# Patient Record
Sex: FEMALE | Race: WHITE | Employment: OTHER | URBAN - METROPOLITAN AREA
[De-identification: names, ages, dates, MRNs, and addresses within clinical notes are randomized per-mention and may not be internally consistent; named-entity substitution may affect disease eponyms.]

---

## 2017-02-08 ENCOUNTER — HOSPITAL ENCOUNTER (OUTPATIENT)
Dept: GENERAL RADIOLOGY | Age: 82
Discharge: HOME | End: 2017-02-08
Payer: MEDICARE

## 2017-02-08 DIAGNOSIS — R10.10 PAIN OF UPPER ABDOMEN: ICD-10-CM

## 2017-02-08 PROCEDURE — 74246 X-RAY XM UPR GI TRC 2CNTRST: CPT | Performed by: RADIOLOGY

## 2017-02-08 PROCEDURE — 74247 FL UGI WITH AIR CONTRAST: CPT

## 2017-02-08 PROCEDURE — G9500 RAD EXPOS IND/EXP TM DOC: HCPCS | Performed by: RADIOLOGY

## 2017-02-08 PROCEDURE — 74220 X-RAY XM ESOPHAGUS 1CNTRST: CPT

## 2017-02-13 PROBLEM — R06.00 DYSPNEA: Status: ACTIVE | Noted: 2017-02-13

## 2017-07-21 PROBLEM — Z85.828 HISTORY OF SKIN CANCER: Status: ACTIVE | Noted: 2017-07-21

## 2017-08-22 ENCOUNTER — HOSPITAL ENCOUNTER (OUTPATIENT)
Age: 82
Setting detail: OUTPATIENT SURGERY
Discharge: HOME | End: 2017-08-22
Attending: OPHTHALMOLOGY | Admitting: OPHTHALMOLOGY
Payer: MEDICARE

## 2017-08-22 VITALS
RESPIRATION RATE: 14 BRPM | TEMPERATURE: 97.5 F | HEART RATE: 68 BPM | OXYGEN SATURATION: 99 % | WEIGHT: 164 LBS | HEIGHT: 64 IN | SYSTOLIC BLOOD PRESSURE: 177 MMHG | BODY MASS INDEX: 28 KG/M2 | DIASTOLIC BLOOD PRESSURE: 72 MMHG

## 2017-08-22 PROBLEM — H25.12 CATARACT, NUCLEAR SCLEROTIC, LEFT EYE: Status: RESOLVED | Noted: 2017-08-22 | Resolved: 2017-08-22

## 2017-08-22 LAB — GLUCOSE BLD-MCNC: 169 MG/DL (ref 65–105)

## 2017-08-22 PROCEDURE — 2500000003 HC RX 250 WO HCPCS: Performed by: OPHTHALMOLOGY

## 2017-08-22 PROCEDURE — 82947 ASSAY GLUCOSE BLOOD QUANT: CPT

## 2017-08-22 PROCEDURE — 3600000013 HC SURGERY LEVEL 3 ADDTL 15MIN: Performed by: OPHTHALMOLOGY

## 2017-08-22 PROCEDURE — 7100000011 HC PHASE II RECOVERY - ADDTL 15 MIN: Performed by: OPHTHALMOLOGY

## 2017-08-22 PROCEDURE — 6370000000 HC RX 637 (ALT 250 FOR IP): Performed by: OPHTHALMOLOGY

## 2017-08-22 PROCEDURE — 2580000003 HC RX 258: Performed by: OPHTHALMOLOGY

## 2017-08-22 PROCEDURE — V2632 POST CHMBR INTRAOCULAR LENS: HCPCS | Performed by: OPHTHALMOLOGY

## 2017-08-22 PROCEDURE — 7100000010 HC PHASE II RECOVERY - FIRST 15 MIN: Performed by: OPHTHALMOLOGY

## 2017-08-22 PROCEDURE — 3600000003 HC SURGERY LEVEL 3 BASE: Performed by: OPHTHALMOLOGY

## 2017-08-22 PROCEDURE — 99152 MOD SED SAME PHYS/QHP 5/>YRS: CPT | Performed by: OPHTHALMOLOGY

## 2017-08-22 PROCEDURE — 6360000002 HC RX W HCPCS: Performed by: OPHTHALMOLOGY

## 2017-08-22 DEVICE — ACRYSOF(R) IQ ASPHERIC NATURAL IOL, SINGLE-PIECE ACRYLIC FOLDABLE PCL, UV WITH BLUE LIGHTFILTER, 13.0MM LENGTH, 6.0MM ANTERIORASYMMETRIC BICONVEX OPTIC, PLANAR HAPTICS.
Type: IMPLANTABLE DEVICE | Site: EYE | Status: FUNCTIONAL
Brand: ACRYSOF®

## 2017-08-22 RX ORDER — PHENYLEPHRINE HCL 2.5 %
1 DROPS OPHTHALMIC (EYE) EVERY 5 MIN PRN
Status: COMPLETED | OUTPATIENT
Start: 2017-08-22 | End: 2017-08-22

## 2017-08-22 RX ORDER — SODIUM CHLORIDE, SODIUM LACTATE, POTASSIUM CHLORIDE, CALCIUM CHLORIDE 600; 310; 30; 20 MG/100ML; MG/100ML; MG/100ML; MG/100ML
INJECTION, SOLUTION INTRAVENOUS CONTINUOUS
Status: DISCONTINUED | OUTPATIENT
Start: 2017-08-22 | End: 2017-08-22 | Stop reason: HOSPADM

## 2017-08-22 RX ORDER — LIDOCAINE HYDROCHLORIDE 10 MG/ML
INJECTION, SOLUTION INFILTRATION; PERINEURAL PRN
Status: DISCONTINUED | OUTPATIENT
Start: 2017-08-22 | End: 2017-08-22 | Stop reason: HOSPADM

## 2017-08-22 RX ORDER — MIDAZOLAM HYDROCHLORIDE 1 MG/ML
INJECTION INTRAMUSCULAR; INTRAVENOUS PRN
Status: DISCONTINUED | OUTPATIENT
Start: 2017-08-22 | End: 2017-08-22 | Stop reason: HOSPADM

## 2017-08-22 RX ORDER — BUPIVACAINE HYDROCHLORIDE 5 MG/ML
INJECTION, SOLUTION EPIDURAL; INTRACAUDAL PRN
Status: DISCONTINUED | OUTPATIENT
Start: 2017-08-22 | End: 2017-08-22 | Stop reason: HOSPADM

## 2017-08-22 RX ORDER — KETOROLAC TROMETHAMINE 5 MG/ML
1 SOLUTION OPHTHALMIC EVERY 5 MIN PRN
Status: COMPLETED | OUTPATIENT
Start: 2017-08-22 | End: 2017-08-22

## 2017-08-22 RX ORDER — CYCLOPENTOLATE HYDROCHLORIDE 10 MG/ML
1 SOLUTION/ DROPS OPHTHALMIC EVERY 5 MIN PRN
Status: COMPLETED | OUTPATIENT
Start: 2017-08-22 | End: 2017-08-22

## 2017-08-22 RX ORDER — TOBRAMYCIN 3 MG/ML
1 SOLUTION/ DROPS OPHTHALMIC EVERY 5 MIN PRN
Status: DISCONTINUED | OUTPATIENT
Start: 2017-08-22 | End: 2017-08-22 | Stop reason: HOSPADM

## 2017-08-22 RX ORDER — NEOMYCIN SULFATE, POLYMYXIN B SULFATE, AND DEXAMETHASONE 3.5; 10000; 1 MG/G; [USP'U]/G; MG/G
OINTMENT OPHTHALMIC PRN
Status: DISCONTINUED | OUTPATIENT
Start: 2017-08-22 | End: 2017-08-22 | Stop reason: HOSPADM

## 2017-08-22 RX ORDER — BALANCED SALT SOLUTION ENRICHED WITH BICARBONATE, DEXTROSE, AND GLUTATHIONE
KIT INTRAOCULAR PRN
Status: DISCONTINUED | OUTPATIENT
Start: 2017-08-22 | End: 2017-08-22 | Stop reason: HOSPADM

## 2017-08-22 RX ORDER — LORAZEPAM 1 MG/1
1 TABLET ORAL ONCE
Status: COMPLETED | OUTPATIENT
Start: 2017-08-22 | End: 2017-08-22

## 2017-08-22 RX ORDER — TIMOLOL MALEATE 5 MG/ML
SOLUTION/ DROPS OPHTHALMIC PRN
Status: DISCONTINUED | OUTPATIENT
Start: 2017-08-22 | End: 2017-08-22 | Stop reason: HOSPADM

## 2017-08-22 RX ORDER — BUPIVACAINE HYDROCHLORIDE 5 MG/ML
1 INJECTION, SOLUTION EPIDURAL; INTRACAUDAL ONCE
Status: COMPLETED | OUTPATIENT
Start: 2017-08-22 | End: 2017-08-22

## 2017-08-22 RX ADMIN — CYCLOPENTOLATE HYDROCHLORIDE 1 DROP: 10 SOLUTION/ DROPS OPHTHALMIC at 09:15

## 2017-08-22 RX ADMIN — KETOROLAC TROMETHAMINE 1 DROP: 5 SOLUTION OPHTHALMIC at 09:15

## 2017-08-22 RX ADMIN — TOBRAMYCIN 1 DROP: 3 SOLUTION OPHTHALMIC at 09:15

## 2017-08-22 RX ADMIN — CYCLOPENTOLATE HYDROCHLORIDE 1 DROP: 10 SOLUTION/ DROPS OPHTHALMIC at 09:31

## 2017-08-22 RX ADMIN — TOBRAMYCIN 1 DROP: 3 SOLUTION OPHTHALMIC at 09:31

## 2017-08-22 RX ADMIN — PHENYLEPHRINE HYDROCHLORIDE 1 DROP: 25 SOLUTION/ DROPS OPHTHALMIC at 09:15

## 2017-08-22 RX ADMIN — LORAZEPAM 1 MG: 1 TABLET ORAL at 09:09

## 2017-08-22 RX ADMIN — PHENYLEPHRINE HYDROCHLORIDE 1 DROP: 25 SOLUTION/ DROPS OPHTHALMIC at 09:31

## 2017-08-22 RX ADMIN — BUPIVACAINE HYDROCHLORIDE 5 MG: 5 INJECTION, SOLUTION EPIDURAL; INTRACAUDAL; PERINEURAL at 09:16

## 2017-08-22 RX ADMIN — KETOROLAC TROMETHAMINE 1 DROP: 5 SOLUTION OPHTHALMIC at 09:31

## 2017-08-22 RX ADMIN — SODIUM CHLORIDE, POTASSIUM CHLORIDE, SODIUM LACTATE AND CALCIUM CHLORIDE: 600; 310; 30; 20 INJECTION, SOLUTION INTRAVENOUS at 09:26

## 2017-08-22 ASSESSMENT — PAIN - FUNCTIONAL ASSESSMENT: PAIN_FUNCTIONAL_ASSESSMENT: 0-10

## 2017-08-22 ASSESSMENT — PAIN SCALES - GENERAL
PAINLEVEL_OUTOF10: 0
PAINLEVEL_OUTOF10: 0

## 2017-09-05 ENCOUNTER — HOSPITAL ENCOUNTER (OUTPATIENT)
Age: 82
Setting detail: OUTPATIENT SURGERY
Discharge: HOME | End: 2017-09-05
Attending: OPHTHALMOLOGY | Admitting: OPHTHALMOLOGY
Payer: MEDICARE

## 2017-09-05 VITALS
TEMPERATURE: 98 F | RESPIRATION RATE: 14 BRPM | WEIGHT: 163 LBS | BODY MASS INDEX: 27.83 KG/M2 | SYSTOLIC BLOOD PRESSURE: 147 MMHG | HEART RATE: 56 BPM | OXYGEN SATURATION: 97 % | HEIGHT: 64 IN | DIASTOLIC BLOOD PRESSURE: 79 MMHG

## 2017-09-05 PROBLEM — H25.11 CATARACT, NUCLEAR SCLEROTIC, RIGHT EYE: Status: RESOLVED | Noted: 2017-09-05 | Resolved: 2017-09-05

## 2017-09-05 LAB — GLUCOSE BLD-MCNC: 224 MG/DL (ref 65–105)

## 2017-09-05 PROCEDURE — 2580000003 HC RX 258: Performed by: OPHTHALMOLOGY

## 2017-09-05 PROCEDURE — 2500000003 HC RX 250 WO HCPCS: Performed by: OPHTHALMOLOGY

## 2017-09-05 PROCEDURE — 3600000003 HC SURGERY LEVEL 3 BASE: Performed by: OPHTHALMOLOGY

## 2017-09-05 PROCEDURE — 99153 MOD SED SAME PHYS/QHP EA: CPT | Performed by: OPHTHALMOLOGY

## 2017-09-05 PROCEDURE — 7100000010 HC PHASE II RECOVERY - FIRST 15 MIN: Performed by: OPHTHALMOLOGY

## 2017-09-05 PROCEDURE — 7100000011 HC PHASE II RECOVERY - ADDTL 15 MIN: Performed by: OPHTHALMOLOGY

## 2017-09-05 PROCEDURE — 6370000000 HC RX 637 (ALT 250 FOR IP): Performed by: OPHTHALMOLOGY

## 2017-09-05 PROCEDURE — 99152 MOD SED SAME PHYS/QHP 5/>YRS: CPT | Performed by: OPHTHALMOLOGY

## 2017-09-05 PROCEDURE — 82947 ASSAY GLUCOSE BLOOD QUANT: CPT

## 2017-09-05 PROCEDURE — 3600000013 HC SURGERY LEVEL 3 ADDTL 15MIN: Performed by: OPHTHALMOLOGY

## 2017-09-05 PROCEDURE — V2632 POST CHMBR INTRAOCULAR LENS: HCPCS | Performed by: OPHTHALMOLOGY

## 2017-09-05 PROCEDURE — 6360000002 HC RX W HCPCS: Performed by: OPHTHALMOLOGY

## 2017-09-05 DEVICE — ACRYSOF(R) IQ ASPHERIC NATURAL IOL, SINGLE-PIECE ACRYLIC FOLDABLE PCL, UV WITH BLUE LIGHTFILTER, 13.0MM LENGTH, 6.0MM ANTERIORASYMMETRIC BICONVEX OPTIC, PLANAR HAPTICS.
Type: IMPLANTABLE DEVICE | Site: EYE | Status: FUNCTIONAL
Brand: ACRYSOF®

## 2017-09-05 RX ORDER — CYCLOPENTOLATE HYDROCHLORIDE 10 MG/ML
1 SOLUTION/ DROPS OPHTHALMIC EVERY 5 MIN PRN
Status: COMPLETED | OUTPATIENT
Start: 2017-09-05 | End: 2017-09-05

## 2017-09-05 RX ORDER — MIDAZOLAM HYDROCHLORIDE 1 MG/ML
INJECTION INTRAMUSCULAR; INTRAVENOUS PRN
Status: DISCONTINUED | OUTPATIENT
Start: 2017-09-05 | End: 2017-09-05 | Stop reason: HOSPADM

## 2017-09-05 RX ORDER — PHENYLEPHRINE HCL 2.5 %
1 DROPS OPHTHALMIC (EYE) EVERY 5 MIN PRN
Status: COMPLETED | OUTPATIENT
Start: 2017-09-05 | End: 2017-09-05

## 2017-09-05 RX ORDER — BALANCED SALT SOLUTION ENRICHED WITH BICARBONATE, DEXTROSE, AND GLUTATHIONE
KIT INTRAOCULAR PRN
Status: DISCONTINUED | OUTPATIENT
Start: 2017-09-05 | End: 2017-09-05 | Stop reason: HOSPADM

## 2017-09-05 RX ORDER — SODIUM CHLORIDE, SODIUM LACTATE, POTASSIUM CHLORIDE, CALCIUM CHLORIDE 600; 310; 30; 20 MG/100ML; MG/100ML; MG/100ML; MG/100ML
INJECTION, SOLUTION INTRAVENOUS CONTINUOUS
Status: DISCONTINUED | OUTPATIENT
Start: 2017-09-05 | End: 2017-09-05 | Stop reason: HOSPADM

## 2017-09-05 RX ORDER — NEOMYCIN SULFATE, POLYMYXIN B SULFATE, AND DEXAMETHASONE 3.5; 10000; 1 MG/G; [USP'U]/G; MG/G
OINTMENT OPHTHALMIC PRN
Status: DISCONTINUED | OUTPATIENT
Start: 2017-09-05 | End: 2017-09-05 | Stop reason: HOSPADM

## 2017-09-05 RX ORDER — LORAZEPAM 1 MG/1
1 TABLET ORAL ONCE
Status: COMPLETED | OUTPATIENT
Start: 2017-09-05 | End: 2017-09-05

## 2017-09-05 RX ORDER — TOBRAMYCIN 3 MG/ML
1 SOLUTION/ DROPS OPHTHALMIC EVERY 5 MIN PRN
Status: DISCONTINUED | OUTPATIENT
Start: 2017-09-05 | End: 2017-09-05 | Stop reason: HOSPADM

## 2017-09-05 RX ORDER — KETOROLAC TROMETHAMINE 5 MG/ML
1 SOLUTION OPHTHALMIC EVERY 5 MIN PRN
Status: COMPLETED | OUTPATIENT
Start: 2017-09-05 | End: 2017-09-05

## 2017-09-05 RX ORDER — BUPIVACAINE HYDROCHLORIDE 5 MG/ML
1 INJECTION, SOLUTION EPIDURAL; INTRACAUDAL ONCE
Status: COMPLETED | OUTPATIENT
Start: 2017-09-05 | End: 2017-09-05

## 2017-09-05 RX ORDER — TIMOLOL MALEATE 5 MG/ML
SOLUTION/ DROPS OPHTHALMIC PRN
Status: DISCONTINUED | OUTPATIENT
Start: 2017-09-05 | End: 2017-09-05 | Stop reason: HOSPADM

## 2017-09-05 RX ORDER — BUPIVACAINE HYDROCHLORIDE 5 MG/ML
INJECTION, SOLUTION EPIDURAL; INTRACAUDAL PRN
Status: DISCONTINUED | OUTPATIENT
Start: 2017-09-05 | End: 2017-09-05 | Stop reason: HOSPADM

## 2017-09-05 RX ADMIN — KETOROLAC TROMETHAMINE 1 DROP: 5 SOLUTION OPHTHALMIC at 07:19

## 2017-09-05 RX ADMIN — TOBRAMYCIN 1 DROP: 3 SOLUTION OPHTHALMIC at 06:57

## 2017-09-05 RX ADMIN — CYCLOPENTOLATE HYDROCHLORIDE 1 DROP: 10 SOLUTION/ DROPS OPHTHALMIC at 07:19

## 2017-09-05 RX ADMIN — LORAZEPAM 1 MG: 1 TABLET ORAL at 06:54

## 2017-09-05 RX ADMIN — KETOROLAC TROMETHAMINE 1 DROP: 5 SOLUTION OPHTHALMIC at 06:56

## 2017-09-05 RX ADMIN — BUPIVACAINE HYDROCHLORIDE 5 MG: 5 INJECTION, SOLUTION EPIDURAL; INTRACAUDAL at 06:56

## 2017-09-05 RX ADMIN — TOBRAMYCIN 1 DROP: 3 SOLUTION OPHTHALMIC at 07:19

## 2017-09-05 RX ADMIN — BUPIVACAINE HYDROCHLORIDE 5 MG: 5 INJECTION, SOLUTION EPIDURAL; INTRACAUDAL at 07:18

## 2017-09-05 RX ADMIN — CYCLOPENTOLATE HYDROCHLORIDE 1 DROP: 10 SOLUTION/ DROPS OPHTHALMIC at 06:56

## 2017-09-05 RX ADMIN — PHENYLEPHRINE HYDROCHLORIDE 1 DROP: 25 SOLUTION/ DROPS OPHTHALMIC at 06:56

## 2017-09-05 RX ADMIN — PHENYLEPHRINE HYDROCHLORIDE 1 DROP: 25 SOLUTION/ DROPS OPHTHALMIC at 07:19

## 2017-09-05 RX ADMIN — SODIUM CHLORIDE, POTASSIUM CHLORIDE, SODIUM LACTATE AND CALCIUM CHLORIDE: 600; 310; 30; 20 INJECTION, SOLUTION INTRAVENOUS at 07:09

## 2017-09-05 ASSESSMENT — PAIN SCALES - GENERAL
PAINLEVEL_OUTOF10: 0
PAINLEVEL_OUTOF10: 0

## 2017-09-05 ASSESSMENT — PAIN - FUNCTIONAL ASSESSMENT: PAIN_FUNCTIONAL_ASSESSMENT: 0-10

## 2017-11-28 ENCOUNTER — HOSPITAL ENCOUNTER (OUTPATIENT)
Age: 82
Setting detail: SPECIMEN
Discharge: HOME | End: 2017-11-28
Payer: MEDICARE

## 2017-11-28 LAB
ABSOLUTE EOS #: 0.2 K/UL (ref 0–0.4)
ABSOLUTE IMMATURE GRANULOCYTE: ABNORMAL K/UL (ref 0–0.3)
ABSOLUTE LYMPH #: 3.5 K/UL (ref 1–4.8)
ABSOLUTE MONO #: 1.6 K/UL (ref 0.2–0.8)
ANION GAP SERPL CALCULATED.3IONS-SCNC: 12 MMOL/L (ref 9–17)
BASOPHILS # BLD: 0 % (ref 0–2)
BASOPHILS ABSOLUTE: 0 K/UL (ref 0–0.2)
BUN BLDV-MCNC: 12 MG/DL (ref 8–23)
BUN/CREAT BLD: 17 (ref 9–20)
CALCIUM SERPL-MCNC: 8 MG/DL (ref 8.6–10.4)
CHLORIDE BLD-SCNC: 98 MMOL/L (ref 98–107)
CO2: 25 MMOL/L (ref 20–31)
CREAT SERPL-MCNC: 0.72 MG/DL (ref 0.5–0.9)
DIFFERENTIAL TYPE: ABNORMAL
EOSINOPHILS RELATIVE PERCENT: 1 % (ref 1–4)
GFR AFRICAN AMERICAN: >60 ML/MIN
GFR NON-AFRICAN AMERICAN: >60 ML/MIN
GFR SERPL CREATININE-BSD FRML MDRD: ABNORMAL ML/MIN/{1.73_M2}
GFR SERPL CREATININE-BSD FRML MDRD: ABNORMAL ML/MIN/{1.73_M2}
GLUCOSE BLD-MCNC: 239 MG/DL (ref 70–99)
HCT VFR BLD CALC: 32.2 % (ref 36–46)
HEMOGLOBIN: 10.8 G/DL (ref 12–16)
IMMATURE GRANULOCYTES: ABNORMAL %
LYMPHOCYTES # BLD: 20 % (ref 24–44)
MCH RBC QN AUTO: 33.2 PG (ref 26–34)
MCHC RBC AUTO-ENTMCNC: 33.3 G/DL (ref 31–37)
MCV RBC AUTO: 99.6 FL (ref 80–100)
MONOCYTES # BLD: 9 % (ref 1–7)
PDW BLD-RTO: 13.4 % (ref 11.5–14.5)
PLATELET # BLD: 338 K/UL (ref 130–400)
PLATELET ESTIMATE: ABNORMAL
PMV BLD AUTO: 9 FL (ref 6–12)
POTASSIUM SERPL-SCNC: 3.5 MMOL/L (ref 3.7–5.3)
RBC # BLD: 3.24 M/UL (ref 4–5.2)
RBC # BLD: ABNORMAL 10*6/UL
SEG NEUTROPHILS: 70 % (ref 36–66)
SEGMENTED NEUTROPHILS ABSOLUTE COUNT: 12.2 K/UL (ref 1.8–7.7)
SODIUM BLD-SCNC: 135 MMOL/L (ref 135–144)
URIC ACID: 4.6 MG/DL (ref 2.4–5.7)
WBC # BLD: 17.6 K/UL (ref 3.5–11)
WBC # BLD: ABNORMAL 10*3/UL

## 2017-11-28 PROCEDURE — 84550 ASSAY OF BLOOD/URIC ACID: CPT

## 2017-11-28 PROCEDURE — 85025 COMPLETE CBC W/AUTO DIFF WBC: CPT

## 2017-11-28 PROCEDURE — 80048 BASIC METABOLIC PNL TOTAL CA: CPT

## 2017-11-28 PROCEDURE — 36415 COLL VENOUS BLD VENIPUNCTURE: CPT

## 2017-11-28 PROCEDURE — P9603 ONE-WAY ALLOW PRORATED MILES: HCPCS

## 2017-11-30 ENCOUNTER — HOSPITAL ENCOUNTER (OUTPATIENT)
Age: 82
Setting detail: SPECIMEN
Discharge: HOME | End: 2017-11-30
Payer: MEDICARE

## 2017-11-30 LAB
ABSOLUTE EOS #: 0.3 K/UL (ref 0–0.4)
ABSOLUTE IMMATURE GRANULOCYTE: ABNORMAL K/UL (ref 0–0.3)
ABSOLUTE LYMPH #: 2.4 K/UL (ref 1–4.8)
ABSOLUTE MONO #: 0.9 K/UL (ref 0.2–0.8)
BASOPHILS # BLD: 0 % (ref 0–2)
BASOPHILS ABSOLUTE: 0.1 K/UL (ref 0–0.2)
DIFFERENTIAL TYPE: ABNORMAL
EOSINOPHILS RELATIVE PERCENT: 2 % (ref 1–4)
HCT VFR BLD CALC: 31.6 % (ref 36–46)
HEMOGLOBIN: 10.6 G/DL (ref 12–16)
IMMATURE GRANULOCYTES: ABNORMAL %
LYMPHOCYTES # BLD: 17 % (ref 24–44)
MCH RBC QN AUTO: 33.5 PG (ref 26–34)
MCHC RBC AUTO-ENTMCNC: 33.5 G/DL (ref 31–37)
MCV RBC AUTO: 100.1 FL (ref 80–100)
MONOCYTES # BLD: 6 % (ref 1–7)
PDW BLD-RTO: 13.3 % (ref 11.5–14.5)
PLATELET # BLD: 417 K/UL (ref 130–400)
PLATELET ESTIMATE: ABNORMAL
PMV BLD AUTO: 9.3 FL (ref 6–12)
RBC # BLD: 3.16 M/UL (ref 4–5.2)
RBC # BLD: ABNORMAL 10*6/UL
SEG NEUTROPHILS: 75 % (ref 36–66)
SEGMENTED NEUTROPHILS ABSOLUTE COUNT: 10.7 K/UL (ref 1.8–7.7)
URIC ACID: 5.8 MG/DL (ref 2.4–5.7)
WBC # BLD: 14.3 K/UL (ref 3.5–11)
WBC # BLD: ABNORMAL 10*3/UL

## 2017-11-30 PROCEDURE — 84550 ASSAY OF BLOOD/URIC ACID: CPT

## 2017-11-30 PROCEDURE — 85025 COMPLETE CBC W/AUTO DIFF WBC: CPT

## 2017-11-30 PROCEDURE — 36415 COLL VENOUS BLD VENIPUNCTURE: CPT

## 2017-11-30 PROCEDURE — P9603 ONE-WAY ALLOW PRORATED MILES: HCPCS

## 2017-12-01 ENCOUNTER — HOSPITAL ENCOUNTER (OUTPATIENT)
Age: 82
Setting detail: SPECIMEN
Discharge: HOME | End: 2017-12-01
Payer: MEDICARE

## 2017-12-01 LAB
-: ABNORMAL
AMORPHOUS: ABNORMAL
BACTERIA: ABNORMAL
BILIRUBIN URINE: NEGATIVE
CASTS UA: ABNORMAL /LPF (ref 0–2)
COLOR: YELLOW
COMMENT UA: NORMAL
CRYSTALS, UA: ABNORMAL /HPF
EPITHELIAL CELLS UA: ABNORMAL /HPF (ref 0–5)
GLUCOSE URINE: NEGATIVE
KETONES, URINE: NEGATIVE
LEUKOCYTE ESTERASE, URINE: NEGATIVE
MUCUS: ABNORMAL
NITRITE, URINE: NEGATIVE
OTHER OBSERVATIONS UA: ABNORMAL
PH UA: 5 (ref 5–8)
PROTEIN UA: NEGATIVE
RBC UA: ABNORMAL /HPF (ref 0–2)
RENAL EPITHELIAL, UA: ABNORMAL /HPF
SPECIFIC GRAVITY UA: 1.01 (ref 1–1.03)
TRICHOMONAS: ABNORMAL
TURBIDITY: CLEAR
URINE HGB: NEGATIVE
UROBILINOGEN, URINE: NORMAL
WBC UA: ABNORMAL /HPF (ref 0–5)
YEAST: ABNORMAL

## 2017-12-01 PROCEDURE — 81015 MICROSCOPIC EXAM OF URINE: CPT

## 2017-12-06 ENCOUNTER — HOSPITAL ENCOUNTER (OUTPATIENT)
Age: 82
Setting detail: SPECIMEN
Discharge: HOME | End: 2017-12-06
Payer: MEDICARE

## 2017-12-06 LAB
ABSOLUTE EOS #: 0.6 K/UL (ref 0–0.4)
ABSOLUTE IMMATURE GRANULOCYTE: ABNORMAL K/UL (ref 0–0.3)
ABSOLUTE LYMPH #: 3.9 K/UL (ref 1–4.8)
ABSOLUTE MONO #: 0.8 K/UL (ref 0.2–0.8)
ANION GAP SERPL CALCULATED.3IONS-SCNC: 15 MMOL/L (ref 9–17)
BASOPHILS # BLD: 1 % (ref 0–2)
BASOPHILS ABSOLUTE: 0.1 K/UL (ref 0–0.2)
BUN BLDV-MCNC: 17 MG/DL (ref 8–23)
BUN/CREAT BLD: 19 (ref 9–20)
CALCIUM SERPL-MCNC: 8.7 MG/DL (ref 8.6–10.4)
CHLORIDE BLD-SCNC: 96 MMOL/L (ref 98–107)
CO2: 26 MMOL/L (ref 20–31)
CREAT SERPL-MCNC: 0.9 MG/DL (ref 0.5–0.9)
DIFFERENTIAL TYPE: ABNORMAL
EOSINOPHILS RELATIVE PERCENT: 5 % (ref 1–4)
GFR AFRICAN AMERICAN: >60 ML/MIN
GFR NON-AFRICAN AMERICAN: 60 ML/MIN
GFR SERPL CREATININE-BSD FRML MDRD: ABNORMAL ML/MIN/{1.73_M2}
GFR SERPL CREATININE-BSD FRML MDRD: ABNORMAL ML/MIN/{1.73_M2}
GLUCOSE BLD-MCNC: 154 MG/DL (ref 70–99)
HCT VFR BLD CALC: 36.2 % (ref 36–46)
HEMOGLOBIN: 11.9 G/DL (ref 12–16)
IMMATURE GRANULOCYTES: ABNORMAL %
LYMPHOCYTES # BLD: 35 % (ref 24–44)
MCH RBC QN AUTO: 32.9 PG (ref 26–34)
MCHC RBC AUTO-ENTMCNC: 32.8 G/DL (ref 31–37)
MCV RBC AUTO: 100.2 FL (ref 80–100)
MONOCYTES # BLD: 7 % (ref 1–7)
PDW BLD-RTO: 14 % (ref 11.5–14.5)
PLATELET # BLD: 476 K/UL (ref 130–400)
PLATELET ESTIMATE: ABNORMAL
PMV BLD AUTO: 8.6 FL (ref 6–12)
POTASSIUM SERPL-SCNC: 3.9 MMOL/L (ref 3.7–5.3)
RBC # BLD: 3.61 M/UL (ref 4–5.2)
RBC # BLD: ABNORMAL 10*6/UL
SEG NEUTROPHILS: 52 % (ref 36–66)
SEGMENTED NEUTROPHILS ABSOLUTE COUNT: 5.8 K/UL (ref 1.8–7.7)
SODIUM BLD-SCNC: 137 MMOL/L (ref 135–144)
WBC # BLD: 11.1 K/UL (ref 3.5–11)
WBC # BLD: ABNORMAL 10*3/UL

## 2017-12-06 PROCEDURE — P9603 ONE-WAY ALLOW PRORATED MILES: HCPCS

## 2017-12-06 PROCEDURE — 85025 COMPLETE CBC W/AUTO DIFF WBC: CPT

## 2017-12-06 PROCEDURE — 80048 BASIC METABOLIC PNL TOTAL CA: CPT

## 2017-12-06 PROCEDURE — 36415 COLL VENOUS BLD VENIPUNCTURE: CPT

## 2017-12-14 ENCOUNTER — HOSPITAL ENCOUNTER (OUTPATIENT)
Age: 82
Setting detail: SPECIMEN
Discharge: HOME | End: 2017-12-14
Payer: MEDICARE

## 2017-12-14 LAB
-: ABNORMAL
AMORPHOUS: ABNORMAL
BACTERIA: ABNORMAL
BILIRUBIN URINE: NEGATIVE
CASTS UA: ABNORMAL /LPF (ref 0–2)
COLOR: YELLOW
COMMENT UA: ABNORMAL
CRYSTALS, UA: ABNORMAL /HPF
EPITHELIAL CELLS UA: ABNORMAL /HPF (ref 0–5)
GLUCOSE URINE: ABNORMAL
KETONES, URINE: NEGATIVE
LEUKOCYTE ESTERASE, URINE: ABNORMAL
MUCUS: ABNORMAL
NITRITE, URINE: NEGATIVE
OTHER OBSERVATIONS UA: ABNORMAL
PH UA: 5 (ref 5–8)
PROTEIN UA: NEGATIVE
RBC UA: ABNORMAL /HPF (ref 0–2)
RENAL EPITHELIAL, UA: ABNORMAL /HPF
SPECIFIC GRAVITY UA: 1.02 (ref 1–1.03)
TRICHOMONAS: ABNORMAL
TURBIDITY: ABNORMAL
URINE HGB: ABNORMAL
UROBILINOGEN, URINE: NORMAL
WBC UA: ABNORMAL /HPF (ref 0–5)
YEAST: ABNORMAL

## 2017-12-14 PROCEDURE — 87086 URINE CULTURE/COLONY COUNT: CPT

## 2017-12-14 PROCEDURE — 81001 URINALYSIS AUTO W/SCOPE: CPT

## 2017-12-15 LAB
CULTURE: NORMAL
CULTURE: NORMAL
Lab: NORMAL
Lab: NORMAL
SPECIMEN DESCRIPTION: NORMAL
SPECIMEN DESCRIPTION: NORMAL
STATUS: NORMAL

## 2018-01-16 ENCOUNTER — OFFICE VISIT (OUTPATIENT)
Dept: OBGYN CLINIC | Age: 83
End: 2018-01-16
Payer: MEDICARE

## 2018-01-16 ENCOUNTER — HOSPITAL ENCOUNTER (OUTPATIENT)
Age: 83
Setting detail: SPECIMEN
Discharge: HOME | End: 2018-01-16
Payer: MEDICARE

## 2018-01-16 VITALS
SYSTOLIC BLOOD PRESSURE: 110 MMHG | DIASTOLIC BLOOD PRESSURE: 64 MMHG | BODY MASS INDEX: 26.8 KG/M2 | HEART RATE: 88 BPM | WEIGHT: 157 LBS | HEIGHT: 64 IN

## 2018-01-16 DIAGNOSIS — Z12.39 SCREENING FOR BREAST CANCER: ICD-10-CM

## 2018-01-16 DIAGNOSIS — N93.9 VAGINAL BLEEDING: ICD-10-CM

## 2018-01-16 DIAGNOSIS — Z11.51 SPECIAL SCREENING EXAMINATION FOR HUMAN PAPILLOMAVIRUS (HPV): ICD-10-CM

## 2018-01-16 DIAGNOSIS — R39.9 UTI SYMPTOMS: ICD-10-CM

## 2018-01-16 DIAGNOSIS — Z01.411 ENCNTR FOR GYN EXAM (GENERAL) (ROUTINE) W ABNORMAL FINDINGS: Primary | ICD-10-CM

## 2018-01-16 DIAGNOSIS — N95.0 POSTMENOPAUSAL BLEEDING: ICD-10-CM

## 2018-01-16 LAB
-: ABNORMAL
AMORPHOUS: ABNORMAL
BACTERIA: ABNORMAL
BILIRUBIN URINE: NEGATIVE
CASTS UA: ABNORMAL /LPF
COLOR: YELLOW
COMMENT UA: ABNORMAL
CRYSTALS, UA: ABNORMAL /HPF
EPITHELIAL CELLS UA: ABNORMAL /HPF
GLUCOSE URINE: ABNORMAL
KETONES, URINE: NEGATIVE
LEUKOCYTE ESTERASE, URINE: ABNORMAL
MUCUS: ABNORMAL
NITRITE, URINE: NEGATIVE
OTHER OBSERVATIONS UA: ABNORMAL
PH UA: 7 (ref 5–8)
PROTEIN UA: ABNORMAL
RBC UA: ABNORMAL /HPF
RENAL EPITHELIAL, UA: ABNORMAL /HPF
SPECIFIC GRAVITY UA: 1.02 (ref 1–1.03)
TRICHOMONAS: ABNORMAL
TURBIDITY: ABNORMAL
URINE HGB: ABNORMAL
UROBILINOGEN, URINE: NORMAL
WBC UA: ABNORMAL /HPF
YEAST: ABNORMAL

## 2018-01-16 PROCEDURE — 87070 CULTURE OTHR SPECIMN AEROBIC: CPT

## 2018-01-16 PROCEDURE — 87086 URINE CULTURE/COLONY COUNT: CPT

## 2018-01-16 PROCEDURE — G0145 SCR C/V CYTO,THINLAYER,RESCR: HCPCS

## 2018-01-16 PROCEDURE — G0101 CA SCREEN;PELVIC/BREAST EXAM: HCPCS | Performed by: NURSE PRACTITIONER

## 2018-01-16 PROCEDURE — 81001 URINALYSIS AUTO W/SCOPE: CPT

## 2018-01-16 PROCEDURE — 87624 HPV HI-RISK TYP POOLED RSLT: CPT

## 2018-01-16 ASSESSMENT — PATIENT HEALTH QUESTIONNAIRE - PHQ9
2. FEELING DOWN, DEPRESSED OR HOPELESS: 0
1. LITTLE INTEREST OR PLEASURE IN DOING THINGS: 0
SUM OF ALL RESPONSES TO PHQ9 QUESTIONS 1 & 2: 0
SUM OF ALL RESPONSES TO PHQ QUESTIONS 1-9: 0

## 2018-01-16 NOTE — PROGRESS NOTES
or S4. There was no murmur appreciated. Location, grade, and radiation are not applicable. Extremities: The patients extremities were without calf tenderness, edema, or varicosities. There was full range of motion in all four extremities. Pulses in all four extremities were appreciated and are 2/4. Abdomen: The abdomen was soft and non-tender. There were good bowel sounds in all quadrants and there was no guarding, rebound or rigidity. On evaluation there was no evidence of hepatosplenomegaly and there was no costal vertebral sylvie tenderness bilaterally. No hernias were appreciated. Abdominal Scars: cholecystectomy scar    Psych: The patient had a normal Orientation to: Time, Place, Person, and Situation  There is no Mood / Affect changes    Breast:  (Chest)  normal appearance, no masses or tenderness  Self breast exams were reviewed in detail. Literature was given. Pelvic Exam:  Vulva and vagina appear atrophic. Bimanual exam - vaginal cuff intact. No cervix present. No lesions or masses noted. Rectal Exam:  exam declined by patient          Musculosk:  Normal Gait and station was noted. Digits were evaluated without abnormal findings. Range of motion, stability and strength were evaluated and found to be appropriate for the patients age. ASSESSMENT:      80 y.o. Annual  1. Encntr for gyn exam (general) (routine) w abnormal findings  PAP SMEAR    MARITZA DIGITAL SCREEN W OR WO CAD BILATERAL   2. Special screening examination for human papillomavirus (HPV)  PAP SMEAR   3. Vaginal bleeding  US Pelvis Complete    US Non OB Transvaginal    Genital Culture   4. Postmenopausal bleeding  US Pelvis Complete    US Non OB Transvaginal    Genital Culture   5. UTI symptoms  UA W/REFLEX CULTURE   6.  Screening for breast cancer  MARITZA DIGITAL SCREEN W OR WO CAD BILATERAL          Chief Complaint   Patient presents with    Established New Doctor    Gynecologic Exam          Past Medical History:   Diagnosis Date    Acute MI     Arthritis     CAD (coronary artery disease)     [facility]     Convulsions ([facility] 10/23/2015    History of congestive heart disease     Hypertension     S/P CABG x 3 1999    Seizure disorder ([facility] 10/23/2015    From stroke     Seizures ([facility]     Type II or unspecified type diabetes mellitus without mention of complication, not stated as uncontrolled     Unspecified cerebral artery occlusion with cerebral infarction     Slight Lt Arm and Leg Residual Numbess         Patient Active Problem List    Diagnosis Date Noted    History of skin cancer 07/21/2017    Dyspnea 02/13/2017    Type 2 diabetes mellitus without complication, with long-term current use of insulin ([facility] 10/11/2016    Arthritis 10/23/2015    Carcinoma of uterus ([facility] 10/23/2015    Change in voice 10/23/2015    Chronic cough 10/23/2015    Cystocele 10/23/2015    Encounter for routine gynecological examination 10/23/2015    Essential hypertriglyceridemia 10/23/2015    Gait disturbance 10/23/2015    Heart disease 10/23/2015    Heartburn 10/23/2015    Hoarseness 10/23/2015    Hypothyroidism 10/23/2015    Mitral regurgitation 10/23/2015    Postmenopausal atrophic vaginitis 10/23/2015    Reflux esophagitis 10/23/2015    Stroke syndrome ([facility] 10/23/2015    Tinea corporis 10/23/2015    Tricuspid regurgitation 10/23/2015    Arthritis of knee, degenerative 12/03/2014    CVA (cerebral vascular accident) ([facility] 12/01/2014    TIA (transient ischemic attack) 12/01/2014    Hypertension 12/01/2014    Hyperlipidemia with target LDL less than 70 12/01/2014          Hereditary Breast, Ovarian, Colon and Uterine Cancer screening Done. Tobacco & Secondary smoke risks reviewed; instructed on cessation and avoidance      Counseling Completed:  Preventative Health Recommendations and Follow up. The patient was informed of the recommended preventative health recommendations.     1. Order Specific Question:   Reason for exam:     Answer:   screening    PAP SMEAR     Patient History:    No LMP recorded. Patient has had a hysterectomy. OBGYN Status: Hysterectomy  Past Surgical History:  No date: CAROTID ENDARTERECTOMY  05/22/2017: CATARACT REMOVAL WITH IMPLANT Left      Comment: Robert/Yas  09/05/2017: CATARACT REMOVAL WITH IMPLANT Right      Comment: Scott  No date: CHOLECYSTECTOMY  about 2 years ago: COLONOSCOPY  No date: CORONARY ARTERY BYPASS GRAFT      Comment: 3 vls  No date: HYSTERECTOMY  8/22/2017: [place] REMV CATARACT EXTRACAP,INSERT LENS Left      Comment: EYE CATARACT EMULSIFICATION IOL IMPLANT                performed by Glen Saunders MD at [address]  9/5/2017: [place] REMV CATARACT EXTRACAP,INSERT LENS Right      Comment: EYE CATARACT EMULSIFICATION IOL IMPLANT                performed by Glen Saunders MD at [address]  No date: TONSILLECTOMY    Problem List       Edg Problems Affecting Cytology    Carcinoma of uterus ([address]     Smoking status: Never Smoker                                                              Smokeless tobacco: Never Used                           Standing Status:   Future     Standing Expiration Date:   1/16/2019     Order Specific Question:   Collection Type     Answer: Thin Prep     Order Specific Question:   Prior Abnormal Pap Test     Answer:   No     Order Specific Question:   Screening or Diagnostic     Answer:   Screening     Order Specific Question:   HPV Requested?      Answer:   Yes     Order Specific Question:   High Risk Patient     Answer:   N/A    UA W/REFLEX CULTURE     Standing Status:   Future     Standing Expiration Date:   1/16/2019

## 2018-01-17 ENCOUNTER — TELEPHONE (OUTPATIENT)
Dept: OBGYN CLINIC | Age: 83
End: 2018-01-17

## 2018-01-17 LAB
CULTURE: NORMAL
CULTURE: NORMAL
Lab: NORMAL
SPECIMEN DESCRIPTION: NORMAL
SPECIMEN DESCRIPTION: NORMAL
STATUS: NORMAL

## 2018-01-18 ENCOUNTER — TELEPHONE (OUTPATIENT)
Dept: OBGYN CLINIC | Age: 83
End: 2018-01-18

## 2018-01-18 DIAGNOSIS — R35.0 URINARY FREQUENCY: Primary | ICD-10-CM

## 2018-01-18 RX ORDER — CIPROFLOXACIN 500 MG/1
500 TABLET, FILM COATED ORAL 2 TIMES DAILY
Qty: 20 TABLET | Refills: 0 | Status: SHIPPED | OUTPATIENT
Start: 2018-01-18 | End: 2018-01-28

## 2018-01-18 NOTE — TELEPHONE ENCOUNTER
----- Message from Margo Fleming CNP sent at 1/17/2018  8:03 AM EST -----  +hgb in urine  cipro 500mg PO BID X 7 days  Repeat UA C&S after completing antibiotics

## 2018-01-19 ENCOUNTER — TELEPHONE (OUTPATIENT)
Dept: OBGYN CLINIC | Age: 83
End: 2018-01-19

## 2018-01-19 LAB
HPV SAMPLE: NORMAL
HPV SOURCE: NORMAL
HPV, GENOTYPE 16: NOT DETECTED
HPV, GENOTYPE 18: NOT DETECTED
HPV, HIGH RISK OTHER: NOT DETECTED
HPV, INTERPRETATION: NORMAL

## 2018-01-19 RX ORDER — FLUCONAZOLE 150 MG/1
150 TABLET ORAL ONCE
Qty: 2 TABLET | Refills: 0 | Status: SHIPPED | OUTPATIENT
Start: 2018-01-19 | End: 2018-01-19

## 2018-01-20 LAB
CULTURE: ABNORMAL
Lab: ABNORMAL
SPECIMEN DESCRIPTION: ABNORMAL
SPECIMEN DESCRIPTION: ABNORMAL
STATUS: ABNORMAL

## 2018-01-22 DIAGNOSIS — N39.0 CHRONIC UTI (URINARY TRACT INFECTION): Primary | ICD-10-CM

## 2018-01-25 LAB — CYTOLOGY REPORT: NORMAL

## 2018-01-29 ENCOUNTER — HOSPITAL ENCOUNTER (OUTPATIENT)
Age: 83
Setting detail: SPECIMEN
Discharge: HOME | End: 2018-01-29
Payer: MEDICARE

## 2018-01-29 ENCOUNTER — OFFICE VISIT (OUTPATIENT)
Dept: UROLOGY | Age: 83
End: 2018-01-29
Payer: MEDICARE

## 2018-01-29 VITALS
SYSTOLIC BLOOD PRESSURE: 148 MMHG | TEMPERATURE: 98 F | WEIGHT: 152.8 LBS | HEART RATE: 67 BPM | HEIGHT: 64 IN | BODY MASS INDEX: 26.09 KG/M2 | DIASTOLIC BLOOD PRESSURE: 59 MMHG

## 2018-01-29 DIAGNOSIS — R33.9 INCOMPLETE EMPTYING OF BLADDER: Primary | ICD-10-CM

## 2018-01-29 DIAGNOSIS — R35.0 FREQUENCY OF URINATION: ICD-10-CM

## 2018-01-29 DIAGNOSIS — N39.0 RECURRENT UTI: ICD-10-CM

## 2018-01-29 LAB
-: NORMAL
AMORPHOUS: NORMAL
BACTERIA: NORMAL
BILIRUBIN URINE: NEGATIVE
CASTS UA: NORMAL /LPF (ref 0–8)
COLOR: YELLOW
COMMENT UA: ABNORMAL
CRYSTALS, UA: NORMAL /HPF
EPITHELIAL CELLS UA: NORMAL /HPF (ref 0–5)
GLUCOSE URINE: NEGATIVE
KETONES, URINE: NEGATIVE
LEUKOCYTE ESTERASE, URINE: ABNORMAL
MUCUS: NORMAL
NITRITE, URINE: NEGATIVE
OTHER OBSERVATIONS UA: NORMAL
PH UA: 6 (ref 5–8)
PROTEIN UA: ABNORMAL
RBC UA: NORMAL /HPF (ref 0–4)
RENAL EPITHELIAL, UA: NORMAL /HPF
SPECIFIC GRAVITY UA: 1.02 (ref 1–1.03)
TRICHOMONAS: NORMAL
TURBIDITY: ABNORMAL
URINE HGB: ABNORMAL
UROBILINOGEN, URINE: NORMAL
WBC UA: NORMAL /HPF (ref 0–5)
YEAST: NORMAL

## 2018-01-29 PROCEDURE — 1090F PRES/ABSN URINE INCON ASSESS: CPT | Performed by: UROLOGY

## 2018-01-29 PROCEDURE — G8598 ASA/ANTIPLAT THER USED: HCPCS | Performed by: UROLOGY

## 2018-01-29 PROCEDURE — 1036F TOBACCO NON-USER: CPT | Performed by: UROLOGY

## 2018-01-29 PROCEDURE — 1123F ACP DISCUSS/DSCN MKR DOCD: CPT | Performed by: UROLOGY

## 2018-01-29 PROCEDURE — G8427 DOCREV CUR MEDS BY ELIG CLIN: HCPCS | Performed by: UROLOGY

## 2018-01-29 PROCEDURE — 99204 OFFICE O/P NEW MOD 45 MIN: CPT | Performed by: UROLOGY

## 2018-01-29 PROCEDURE — 4040F PNEUMOC VAC/ADMIN/RCVD: CPT | Performed by: UROLOGY

## 2018-01-29 PROCEDURE — 51798 US URINE CAPACITY MEASURE: CPT | Performed by: UROLOGY

## 2018-01-29 PROCEDURE — G8400 PT W/DXA NO RESULTS DOC: HCPCS | Performed by: UROLOGY

## 2018-01-29 PROCEDURE — G8484 FLU IMMUNIZE NO ADMIN: HCPCS | Performed by: UROLOGY

## 2018-01-29 PROCEDURE — G8417 CALC BMI ABV UP PARAM F/U: HCPCS | Performed by: UROLOGY

## 2018-01-29 ASSESSMENT — ENCOUNTER SYMPTOMS
ABDOMINAL PAIN: 0
COLOR CHANGE: 0
EYE REDNESS: 0
BACK PAIN: 0
SHORTNESS OF BREATH: 0
COUGH: 0
NAUSEA: 0
VOMITING: 0
EYE PAIN: 0
WHEEZING: 0

## 2018-01-29 NOTE — PROGRESS NOTES
Relation Age of Onset    Diabetes Father     Heart Attack Father     Heart Attack Sister      Outpatient Prescriptions Marked as Taking for the 1/29/18 encounter (Office Visit) with Kyle Avery MD   Medication Sig Dispense Refill    Nitroglycerin 400 MCG/SPRAY AERS PLACE 1 SPRAY UNDER THE TONGUE EVERY 5 MINUTES AS NEEDED (CHEST PAIN) FOR UP TO 3 DOSES 1 Bottle 2    clopidogrel (PLAVIX) 75 MG tablet TAKE 1 TABLET DAILY 90 tablet 3    amLODIPine (NORVASC) 10 MG tablet TAKE 1 TABLET DAILY 90 tablet 3    phenytoin (DILANTIN) 100 MG ER capsule TAKE 1 CAPSULE THREE TIMES A  capsule 3    nitroGLYCERIN (NITRODUR) 0.2 MG/HR PLACE 1 PATCH ONTO THE SKIN DAILY, ON 12 HOURS, THEN OFF 12 HOURS 90 patch 1    Metoprolol Tartrate 75 MG TABS TAKE 1 TABLET TWICE A  tablet 1    potassium chloride (KLOR-CON M) 20 MEQ extended release tablet TAKE 1 TABLET DAILY 90 tablet 2    pantoprazole (PROTONIX) 40 MG tablet TAKE 1 TABLET DAILY 90 tablet 2    atorvastatin (LIPITOR) 40 MG tablet TAKE 1 TABLET DAILY 90 tablet 3    metFORMIN (GLUCOPHAGE XR) 500 MG extended release tablet Take 1 tablet by mouth Daily with supper 90 tablet 3    ondansetron (ZOFRAN) 4 MG tablet Take 1 tablet by mouth daily as needed for Nausea or Vomiting 10 tablet 0    losartan (COZAAR) 100 MG tablet TAKE 1 TABLET DAILY 90 tablet 3    diphenoxylate-atropine (LOMOTIL) 2.5-0.025 MG per tablet Take 1 tablet by mouth 4 times daily as needed for Diarrhea 120 tablet 1    levothyroxine (SYNTHROID) 137 MCG tablet TAKE 1 TABLET DAILY AS DIRECTED 90 tablet 2    hydrALAZINE (APRESOLINE) 50 MG tablet TAKE 1 TABLET EVERY 8 HOURS 270 tablet 2    furosemide (LASIX) 40 MG tablet Take 1 tablet by mouth daily 90 tablet 1    Insulin Syringe-Needle U-100 (B-D INS SYRINGE 0.5CC/30GX1/2\") 30G X 1/2\" 0.5 ML MISC 1 each by Does not apply route 2 times daily 100 each 2    FREESTYLE LANCETS MISC 1 each by Does not apply route 3 times daily 300 each 5   

## 2018-01-30 ENCOUNTER — TELEPHONE (OUTPATIENT)
Dept: UROLOGY | Age: 83
End: 2018-01-30

## 2018-01-31 ENCOUNTER — TELEPHONE (OUTPATIENT)
Dept: UROLOGY | Age: 83
End: 2018-01-31

## 2018-01-31 DIAGNOSIS — N39.0 URINARY TRACT INFECTION WITHOUT HEMATURIA, SITE UNSPECIFIED: Primary | ICD-10-CM

## 2018-01-31 LAB
CULTURE: ABNORMAL
CULTURE: ABNORMAL
Lab: ABNORMAL
SPECIMEN DESCRIPTION: ABNORMAL
STATUS: ABNORMAL

## 2018-01-31 RX ORDER — FLUCONAZOLE 100 MG/1
TABLET ORAL
Qty: 6 TABLET | Refills: 0 | Status: SHIPPED | OUTPATIENT
Start: 2018-01-31 | End: 2018-02-13 | Stop reason: ALTCHOICE

## 2018-01-31 NOTE — TELEPHONE ENCOUNTER
Spoke with Emir Mehta today and Pt is unable to perform the self cath. She  states Pt's daughter is unwilling to do straight cath for Pt. Emir Mehta states Pt is willing to have permanent catheter. Per Freddie Alaniz CNP, order for catheter placement given and use plug. Pt is scheduled for Cysto and urodynamics in the hospital on 2/23/18.

## 2018-02-13 ENCOUNTER — HOSPITAL ENCOUNTER (OUTPATIENT)
Dept: PREADMISSION TESTING | Age: 83
Discharge: HOME | End: 2018-02-17
Payer: MEDICARE

## 2018-02-13 VITALS
HEIGHT: 64 IN | TEMPERATURE: 97.8 F | SYSTOLIC BLOOD PRESSURE: 190 MMHG | DIASTOLIC BLOOD PRESSURE: 65 MMHG | WEIGHT: 151 LBS | BODY MASS INDEX: 25.78 KG/M2 | RESPIRATION RATE: 18 BRPM | OXYGEN SATURATION: 96 % | HEART RATE: 71 BPM

## 2018-02-13 LAB
ANION GAP SERPL CALCULATED.3IONS-SCNC: 13 MMOL/L (ref 9–17)
BUN BLDV-MCNC: 24 MG/DL (ref 8–23)
CHLORIDE BLD-SCNC: 101 MMOL/L (ref 98–107)
CO2: 25 MMOL/L (ref 20–31)
CREAT SERPL-MCNC: 0.63 MG/DL (ref 0.5–0.9)
EKG ATRIAL RATE: 69 BPM
EKG P AXIS: 60 DEGREES
EKG P-R INTERVAL: 186 MS
EKG Q-T INTERVAL: 458 MS
EKG QRS DURATION: 132 MS
EKG QTC CALCULATION (BAZETT): 490 MS
EKG R AXIS: -22 DEGREES
EKG T AXIS: 66 DEGREES
EKG VENTRICULAR RATE: 69 BPM
GFR AFRICAN AMERICAN: >60 ML/MIN
GFR NON-AFRICAN AMERICAN: >60 ML/MIN
GFR SERPL CREATININE-BSD FRML MDRD: ABNORMAL ML/MIN/{1.73_M2}
GFR SERPL CREATININE-BSD FRML MDRD: ABNORMAL ML/MIN/{1.73_M2}
GLUCOSE BLD-MCNC: 304 MG/DL (ref 70–99)
HCT VFR BLD CALC: 36.2 % (ref 36.3–47.1)
HEMOGLOBIN: 11.5 G/DL (ref 11.9–15.1)
POTASSIUM SERPL-SCNC: 4.4 MMOL/L (ref 3.7–5.3)
SODIUM BLD-SCNC: 139 MMOL/L (ref 135–144)

## 2018-02-13 PROCEDURE — 85018 HEMOGLOBIN: CPT

## 2018-02-13 PROCEDURE — 82947 ASSAY GLUCOSE BLOOD QUANT: CPT

## 2018-02-13 PROCEDURE — 36415 COLL VENOUS BLD VENIPUNCTURE: CPT

## 2018-02-13 PROCEDURE — 85014 HEMATOCRIT: CPT

## 2018-02-13 PROCEDURE — 80051 ELECTROLYTE PANEL: CPT

## 2018-02-13 PROCEDURE — 93005 ELECTROCARDIOGRAM TRACING: CPT

## 2018-02-13 PROCEDURE — 84520 ASSAY OF UREA NITROGEN: CPT

## 2018-02-13 PROCEDURE — 82565 ASSAY OF CREATININE: CPT

## 2018-02-13 PROCEDURE — 87086 URINE CULTURE/COLONY COUNT: CPT

## 2018-02-13 RX ORDER — SODIUM CHLORIDE, SODIUM LACTATE, POTASSIUM CHLORIDE, CALCIUM CHLORIDE 600; 310; 30; 20 MG/100ML; MG/100ML; MG/100ML; MG/100ML
1000 INJECTION, SOLUTION INTRAVENOUS CONTINUOUS
Status: CANCELLED | OUTPATIENT
Start: 2018-02-13

## 2018-02-13 NOTE — H&P
Meds:.  Allergies  has No Known Allergies. Family History    family history includes Diabetes in her father; Heart Attack in her father and sister. Family Status   Relation Status   [employer] Father     Sister          Social History  Social History     Social History    Marital status:      Spouse name: N/A    Number of children: N/A    Years of education: N/A     Occupational History    Not on file. Social History Main Topics    Smoking status: Never Smoker    Smokeless tobacco: Never Used    Alcohol use No    Drug use: No    Sexual activity: Not on file     Other Topics Concern    Not on file     Social History Narrative    No narrative on file                 Hobbies:  Great grandmother , TV game shows , bakes. OBJECTIVE:   VITALS:  vitals were not taken for this visit. CONSTITUTIONAL: Alert and oriented times 3, no acute distress and cooperative to examination. friendly and pleasant , uses a walker    SKIN: rash No    HEENT: Head is normocephalic, atraumatic. EOMI, PERRLA    Oral air way :slightly narrow Yes    NECK: neck supple, no lymphadenopathy noted, trachea midline and straight       2+ carotid, no bruit    LUNGS: Chest expands equally bilaterally upon respiration, no accessory muscle used. Ausculation reveals no adventitious breath sounds. CARDIOVASCULAR: \"Heart sounds are normal.  Regular rate and rhythm without murmur, s/p CABG     ABDOMEN: Bowel sounds are present in all four quadrants      GENATALIA:Deferred. NEUROLOGIC: \"CN II-XII are grossly intact.        EXTREMITIES: Pitting edema:  Yes,  Varicose veins: No     Dorsal pedal/posterior tibial pulses palpable: Yes         Strength:  Normal       Patient Active Problem List   Diagnosis    CVA (cerebral vascular accident) ([address]    TIA (transient ischemic attack)    Hypertension    Hyperlipidemia with target LDL less than 70    Arthritis of knee, degenerative    Arthritis    Carcinoma of uterus ([address]   

## 2018-02-13 NOTE — ANESTHESIA PRE-OP
Anesthesia Focused Assessment    STOP-BANG Sleep Apnea Questionnaire    Obstructive Sleep Apnea:                                                                 No     Machine used:                                                                                  No            SNORE loudly (heard through closed doors)? No      TIRED, fatigued, sleepy during daytime? No      OBSERVED stopping breathing during sleep? No      High blood PRESSURE being treated? Yes      BMI over 35? No  AGE over 48? Yes  NECK circumference over 16\"? No  GENDER (male)? No                High risk 5-8  Intermediate risk 3-4  Low risk 0-2    Total 3  High risk 5-8  Intermediate risk 3-4  Low risk 0-2      Type 1 DM:                                                                                        No    TYPE 2:                                                                                             Yes    If yes, insulin dependent? No    Coronary Artery Disease : Yes        Active smoker                                                                                   No    Drinks Alcohol                                                                                   No    Dentition: Dentures                                                                            yes         Partial                                                                                                 No    Any loose or missing teeth                                                                 No    Defib / AICD / Pacemaker:                                                               No    Renal Failure: No    If Yes, On dialysis?       Hx of anesthesia complications with Patient                               No    Hx of anesthesia complications with family No    Medical or cardiac clearance ordered:                                         Yes, cardiac in chart , I told her to ask her Dr who put her on Plavix when to stop     Anesthesiologist called:                                                                No    JEREMIAH Taylor PA-C  Electronically signed 2/13/2018 at 3:40 PM

## 2018-02-14 LAB
CULTURE: NO GROWTH
CULTURE: NORMAL
Lab: NORMAL
SPECIMEN DESCRIPTION: NORMAL
STATUS: NORMAL

## 2018-02-22 ENCOUNTER — TELEPHONE (OUTPATIENT)
Dept: UROLOGY | Age: 83
End: 2018-02-22

## 2018-03-27 PROBLEM — S89.91XS KNEE INJURY, RIGHT, SEQUELA: Status: ACTIVE | Noted: 2018-03-27

## 2018-03-27 PROBLEM — S80.01XA TRAUMATIC HEMATOMA OF RIGHT KNEE: Status: ACTIVE | Noted: 2018-03-27

## 2018-03-28 PROBLEM — R19.7 DIARRHEA: Status: ACTIVE | Noted: 2018-03-28

## 2018-04-09 ENCOUNTER — TELEPHONE (OUTPATIENT)
Dept: UROLOGY | Age: 83
End: 2018-04-09

## 2018-05-07 ENCOUNTER — HOSPITAL ENCOUNTER (OUTPATIENT)
Dept: ULTRASOUND IMAGING | Age: 83
Discharge: HOME | End: 2018-05-09
Payer: MEDICARE

## 2018-05-07 DIAGNOSIS — N95.0 POSTMENOPAUSAL BLEEDING: ICD-10-CM

## 2018-05-07 DIAGNOSIS — N93.9 VAGINAL BLEEDING: ICD-10-CM

## 2018-05-07 PROCEDURE — 76856 US EXAM PELVIC COMPLETE: CPT

## 2018-05-07 PROCEDURE — 76830 TRANSVAGINAL US NON-OB: CPT

## 2018-05-29 ENCOUNTER — OFFICE VISIT (OUTPATIENT)
Dept: OBGYN CLINIC | Age: 83
End: 2018-05-29
Payer: MEDICARE

## 2018-05-29 VITALS — HEIGHT: 64 IN | BODY MASS INDEX: 26.8 KG/M2 | WEIGHT: 157 LBS

## 2018-05-29 DIAGNOSIS — N39.0 CHRONIC UTI (URINARY TRACT INFECTION): Primary | ICD-10-CM

## 2018-05-29 DIAGNOSIS — C55: ICD-10-CM

## 2018-05-29 PROCEDURE — 1036F TOBACCO NON-USER: CPT | Performed by: OBSTETRICS & GYNECOLOGY

## 2018-05-29 PROCEDURE — 99213 OFFICE O/P EST LOW 20 MIN: CPT | Performed by: OBSTETRICS & GYNECOLOGY

## 2018-05-29 PROCEDURE — G8427 DOCREV CUR MEDS BY ELIG CLIN: HCPCS | Performed by: OBSTETRICS & GYNECOLOGY

## 2018-05-29 PROCEDURE — G8400 PT W/DXA NO RESULTS DOC: HCPCS | Performed by: OBSTETRICS & GYNECOLOGY

## 2018-05-29 PROCEDURE — 1123F ACP DISCUSS/DSCN MKR DOCD: CPT | Performed by: OBSTETRICS & GYNECOLOGY

## 2018-05-29 PROCEDURE — G8417 CALC BMI ABV UP PARAM F/U: HCPCS | Performed by: OBSTETRICS & GYNECOLOGY

## 2018-05-29 PROCEDURE — 1090F PRES/ABSN URINE INCON ASSESS: CPT | Performed by: OBSTETRICS & GYNECOLOGY

## 2018-05-29 PROCEDURE — G8598 ASA/ANTIPLAT THER USED: HCPCS | Performed by: OBSTETRICS & GYNECOLOGY

## 2018-05-29 PROCEDURE — 4040F PNEUMOC VAC/ADMIN/RCVD: CPT | Performed by: OBSTETRICS & GYNECOLOGY

## 2018-06-12 ENCOUNTER — TELEPHONE (OUTPATIENT)
Dept: UROLOGY | Age: 83
End: 2018-06-12

## 2018-07-27 ENCOUNTER — HOSPITAL ENCOUNTER (OUTPATIENT)
Age: 83
Setting detail: OUTPATIENT SURGERY
Discharge: HOME | End: 2018-07-27
Attending: UROLOGY | Admitting: UROLOGY
Payer: MEDICARE

## 2018-07-27 VITALS
OXYGEN SATURATION: 96 % | RESPIRATION RATE: 18 BRPM | WEIGHT: 150 LBS | BODY MASS INDEX: 26.58 KG/M2 | TEMPERATURE: 97.5 F | HEIGHT: 63 IN | SYSTOLIC BLOOD PRESSURE: 160 MMHG | HEART RATE: 74 BPM | DIASTOLIC BLOOD PRESSURE: 43 MMHG

## 2018-07-27 LAB — GLUCOSE BLD-MCNC: 203 MG/DL (ref 65–105)

## 2018-07-27 PROCEDURE — 82947 ASSAY GLUCOSE BLOOD QUANT: CPT

## 2018-07-27 PROCEDURE — 87086 URINE CULTURE/COLONY COUNT: CPT

## 2018-07-27 PROCEDURE — 7100000041 HC SPAR PHASE II RECOVERY - ADDTL 15 MIN: Performed by: UROLOGY

## 2018-07-27 PROCEDURE — 6360000004 HC RX CONTRAST MEDICATION: Performed by: UROLOGY

## 2018-07-27 PROCEDURE — 2709999900 HC NON-CHARGEABLE SUPPLY: Performed by: UROLOGY

## 2018-07-27 PROCEDURE — 87186 SC STD MICRODIL/AGAR DIL: CPT

## 2018-07-27 PROCEDURE — 7100000040 HC SPAR PHASE II RECOVERY - FIRST 15 MIN: Performed by: UROLOGY

## 2018-07-27 PROCEDURE — 87077 CULTURE AEROBIC IDENTIFY: CPT

## 2018-07-27 PROCEDURE — 3600000002 HC SURGERY LEVEL 2 BASE: Performed by: UROLOGY

## 2018-07-27 PROCEDURE — 6370000000 HC RX 637 (ALT 250 FOR IP): Performed by: UROLOGY

## 2018-07-27 PROCEDURE — 3600000012 HC SURGERY LEVEL 2 ADDTL 15MIN: Performed by: UROLOGY

## 2018-07-27 PROCEDURE — 2500000003 HC RX 250 WO HCPCS: Performed by: UROLOGY

## 2018-07-27 RX ORDER — WATER 1000 ML/1000ML
INJECTION, SOLUTION INTRAVENOUS PRN
Status: DISCONTINUED | OUTPATIENT
Start: 2018-07-27 | End: 2018-07-27 | Stop reason: HOSPADM

## 2018-07-27 RX ORDER — ULTRASOUND COUPLING MEDIUM
GEL (GRAM) TOPICAL PRN
Status: DISCONTINUED | OUTPATIENT
Start: 2018-07-27 | End: 2018-07-27 | Stop reason: HOSPADM

## 2018-07-27 ASSESSMENT — PAIN SCALES - GENERAL: PAINLEVEL_OUTOF10: 0

## 2018-07-27 ASSESSMENT — PAIN - FUNCTIONAL ASSESSMENT: PAIN_FUNCTIONAL_ASSESSMENT: 0-10

## 2018-07-27 NOTE — H&P
Bhupendra Ybarra MD, Donte Marquez MD, Waldo Crane MD, Pilar Cleveland MD, Franci Hogan MD, Mani Scales MD, & Ari Dorado MD    Urology - H&P      Patient:  Becca Barrera  MRN: [id number]  YOB: 1934    CHIEF COMPLAINT:  Recurrent UTIs     HISTORY OF PRESENT ILLNESS:   The patient is a 80 y.o. female who presents with recurrent UTIs and lower urinary tract symptoms including frequency, decreased urinary stream and small voiding amounts. She was also found to have elevated PVR of 500+ cc urine. She now has a chronic lopez catheter. Patient's old records, notes and chart reviewed and summarized above.     Past Medical History:    Past Medical History:   Diagnosis Date    Acute MI     Arthritis     CAD (coronary artery disease)     Cervical cancer ([facility]     Hysterectomy    Convulsions ([facility] 10/23/2015    Full dentures     Upper only    History of congestive heart disease     Hypertension     Prolonged emergence from general anesthesia     S/P CABG x 3 1996    Seizure disorder ([facility] 10/23/2015    From stroke, on Dilantin    Type II or unspecified type diabetes mellitus without mention of complication, not stated as uncontrolled     on Insulin & Metformin    Unspecified cerebral artery occlusion with cerebral infarction 1998    Slight Lt Arm and Leg Residual Numbess    Wears glasses        Past Surgical History:    Past Surgical History:   Procedure Laterality Date    CAROTID ENDARTERECTOMY Right     CHOLECYSTECTOMY      COLONOSCOPY  about 2 years ago   [address]    3 vls    HYSTERECTOMY      [place] REMV CATARACT EXTRACAP,INSERT LENS Left 8/22/2017    EYE CATARACT EMULSIFICATION IOL IMPLANT performed by Rufina Aden MD at [address] [place] [address] Right 9/5/2017    EYE CATARACT EMULSIFICATION IOL IMPLANT performed by Rufina Aden MD at [facility]         Medications:    No current facility-administered medications for this encounter. Allergies:  No Known Allergies    Social History:   Social History     Social History    Marital status:      Spouse name: N/A    Number of children: 3    Years of education: N/A     Occupational History    Not on file. Social History Main Topics    Smoking status: Never Smoker    Smokeless tobacco: Never Used    Alcohol use No    Drug use: No    Sexual activity: No     Other Topics Concern    Not on file     Social History Narrative    No narrative on file       Family History:    Family History   Problem Relation Age of Onset    Diabetes Father     Heart Attack Father     Heart Attack Sister     High Blood Pressure Mother     Heart Attack Brother     No Known Problems Maternal Grandmother     No Known Problems Maternal Grandfather     No Known Problems Paternal Grandmother     No Known Problems Paternal Grandfather     High Blood Pressure Daughter     Diabetes Daughter     No Known Problems Son        REVIEW OF SYSTEMS:  Review of Systems   Constitutional: Positive for appetite change (decreased). Negative for chills and fever. Eyes: Negative for pain, redness and visual disturbance. Respiratory: Negative for cough, shortness of breath and wheezing. Cardiovascular: Negative for chest pain and leg swelling. Gastrointestinal: Negative for abdominal pain, nausea and vomiting. Genitourinary: Positive for difficulty urinating and frequency. Negative for decreased urine volume, dysuria, flank pain, hematuria and vaginal discharge. Musculoskeletal: Positive for joint swelling (ankles). Negative for back pain and myalgias. Skin: Positive for wound (bed sore ). Negative for color change and rash. Neurological: Negative for dizziness, tremors and numbness. Hematological: Negative for adenopathy. Bruises/bleeds easily. Physical Exam:    This a 80 y.o. female  There were no vitals filed for this visit.   Body mass index is 26.23 kg/m². Physical Exam  Constitutional: Patient in no acute distress, ggod grooming, appropriately dressed  Neuro: Alert and oriented to person, place and time. Psych: Mood normal, affect normal  Skin: No rash noted  HEENT: Head: Normocephalic and atraumatic,Conjunctivae and EOM are normal,Nose- normal, Right/Left External Ear: normal, Mouth: Mucosa Moist  Neck: Supple  Lungs: Respiratory effort is normal  Cardiovascular: strong and regular, no lower leg edema  Abdomen: Soft, non-tender, non-distended with no CVA, Bourgeois catheter in place  Lymphatics: No cervical palpable lymphadenopathy. Bladder non-tender and not distended. Musculoskeletal: Normal gait and station       Labs:  No results for input(s): WBC, HGB, HCT, MCV, PLT in the last 72 hours. No results for input(s): NA, K, CL, CO2, PHOS, BUN, CREATININE in the last 72 hours. Invalid input(s): CA    No results for input(s): COLORU, PHUR, LABCAST, WBCUA, RBCUA, MUCUS, TRICHOMONAS, YEAST, BACTERIA, CLARITYU, SPECGRAV, LEUKOCYTESUR, UROBILINOGEN, [employer] in the last 72 hours. Invalid input(s): NITRATE, GLUCOSEUKETONESUAMORPHOUS      Assessment and Plan   Assessment:  The patient is a 80 y.o. female with:  1. Incomplete emptying of bladder    2. Recurrent UTI    3.  Frequency of urination      Plan:   1) Cysto, UDS    Ashwin Renelle Cheadle, MD  PGY-2, [address] Department of Urology   10:05 AM 7/27/2018

## 2018-07-27 NOTE — OP NOTE
location. The cystoscope was removed. The patient tolerated the procedure well. She was taken to PACU in stable condition. 14 Ethiopian lopez catheter placed using sterile technique. Dr. Matthew Camacho was present for all critical portions of the procedure. Complications: None    Estimated blood loss: 0 cc     Disposition: PACU    Plan:  Patient will follow-up with Dr. Matthew Camacho in 2 weeks to discuss further treatment.      Darius Lopez MD  PGY-2, [address] Department of Urology   7/27/18

## 2018-07-29 LAB
CULTURE: ABNORMAL
CULTURE: ABNORMAL
Lab: ABNORMAL
ORGANISM: ABNORMAL
ORGANISM: ABNORMAL
SPECIMEN DESCRIPTION: ABNORMAL
STATUS: ABNORMAL

## 2018-08-03 ENCOUNTER — TELEPHONE (OUTPATIENT)
Dept: UROLOGY | Age: 83
End: 2018-08-03

## 2018-08-03 NOTE — TELEPHONE ENCOUNTER
Cysto, SP tube placement (possible lapro abd approach) @ [facility] 8/31/18 @ 2:00pm  PAT @ [facility] 8/17/18 @ 2:00pm        Patients voicemail box was full. Could not leave message.  Mailed procedure info 8/3/18

## 2018-08-15 ENCOUNTER — TELEPHONE (OUTPATIENT)
Dept: UROLOGY | Age: 83
End: 2018-08-15

## 2018-08-17 ENCOUNTER — HOSPITAL ENCOUNTER (OUTPATIENT)
Dept: PREADMISSION TESTING | Age: 83
Discharge: HOME | End: 2018-08-21
Payer: MEDICARE

## 2018-08-17 VITALS
BODY MASS INDEX: 27.29 KG/M2 | DIASTOLIC BLOOD PRESSURE: 73 MMHG | RESPIRATION RATE: 16 BRPM | HEART RATE: 61 BPM | TEMPERATURE: 97.5 F | HEIGHT: 63 IN | SYSTOLIC BLOOD PRESSURE: 167 MMHG | OXYGEN SATURATION: 96 % | WEIGHT: 154 LBS

## 2018-08-17 LAB
ANION GAP SERPL CALCULATED.3IONS-SCNC: 11 MMOL/L (ref 9–17)
BUN BLDV-MCNC: 21 MG/DL (ref 8–23)
CHLORIDE BLD-SCNC: 103 MMOL/L (ref 98–107)
CO2: 26 MMOL/L (ref 20–31)
CREAT SERPL-MCNC: 0.91 MG/DL (ref 0.5–0.9)
EKG ATRIAL RATE: 61 BPM
EKG P AXIS: 56 DEGREES
EKG P-R INTERVAL: 208 MS
EKG Q-T INTERVAL: 478 MS
EKG QRS DURATION: 128 MS
EKG QTC CALCULATION (BAZETT): 481 MS
EKG R AXIS: -30 DEGREES
EKG T AXIS: 30 DEGREES
EKG VENTRICULAR RATE: 61 BPM
GFR AFRICAN AMERICAN: >60 ML/MIN
GFR NON-AFRICAN AMERICAN: 59 ML/MIN
GFR SERPL CREATININE-BSD FRML MDRD: ABNORMAL ML/MIN/{1.73_M2}
GFR SERPL CREATININE-BSD FRML MDRD: ABNORMAL ML/MIN/{1.73_M2}
GLUCOSE BLD-MCNC: 244 MG/DL (ref 70–99)
POTASSIUM SERPL-SCNC: 5.3 MMOL/L (ref 3.7–5.3)
SODIUM BLD-SCNC: 140 MMOL/L (ref 135–144)

## 2018-08-17 PROCEDURE — 87086 URINE CULTURE/COLONY COUNT: CPT

## 2018-08-17 PROCEDURE — 93005 ELECTROCARDIOGRAM TRACING: CPT

## 2018-08-17 PROCEDURE — 82565 ASSAY OF CREATININE: CPT

## 2018-08-17 PROCEDURE — 84520 ASSAY OF UREA NITROGEN: CPT

## 2018-08-17 PROCEDURE — 80051 ELECTROLYTE PANEL: CPT

## 2018-08-17 PROCEDURE — 36415 COLL VENOUS BLD VENIPUNCTURE: CPT

## 2018-08-17 PROCEDURE — 82947 ASSAY GLUCOSE BLOOD QUANT: CPT

## 2018-08-17 RX ORDER — SODIUM CHLORIDE, SODIUM LACTATE, POTASSIUM CHLORIDE, CALCIUM CHLORIDE 600; 310; 30; 20 MG/100ML; MG/100ML; MG/100ML; MG/100ML
1000 INJECTION, SOLUTION INTRAVENOUS CONTINUOUS
Status: CANCELLED | OUTPATIENT
Start: 2018-08-17

## 2018-08-17 ASSESSMENT — PAIN SCALES - GENERAL: PAINLEVEL_OUTOF10: 0

## 2018-08-17 NOTE — H&P
Does not apply route 2 times daily, Disp: 100 each, Rfl: 2    levothyroxine (SYNTHROID) 137 MCG tablet, Take 1 tablet by mouth Daily, Disp: 90 tablet, Rfl: 3    hydrALAZINE (APRESOLINE) 50 MG tablet, TAKE 1 TABLET EVERY 8 HOURS (Patient taking differently: TAKE 1 TABLET EVERY 8 HOURS,), Disp: 270 tablet, Rfl: 2    Metoprolol Tartrate 75 MG TABS, TAKE 1 TABLET TWICE A DAY, Disp: 180 tablet, Rfl: 0    nitroGLYCERIN (NITRODUR) 0.2 MG/HR, APPLY 1 PATCH ONTO THE SKIN DAILY, 12 HOURS ON THEN 12 HOURS OFF, Disp: 90 patch, Rfl: 1    furosemide (LASIX) 40 MG tablet, TAKE 1 TABLET DAILY, Disp: 90 tablet, Rfl: 2    Nitroglycerin 400 MCG/SPRAY AERS, PLACE 1 SPRAY UNDER THE TONGUE EVERY 5 MINUTES AS NEEDED (CHEST PAIN) FOR UP TO 3 DOSES, Disp: 1 Bottle, Rfl: 2    clopidogrel (PLAVIX) 75 MG tablet, TAKE 1 TABLET DAILY, Disp: 90 tablet, Rfl: 3    amLODIPine (NORVASC) 10 MG tablet, TAKE 1 TABLET DAILY, Disp: 90 tablet, Rfl: 3    phenytoin (DILANTIN) 100 MG ER capsule, TAKE 1 CAPSULE THREE TIMES A DAY, Disp: 270 capsule, Rfl: 3    potassium chloride (KLOR-CON M) 20 MEQ extended release tablet, TAKE 1 TABLET DAILY, Disp: 90 tablet, Rfl: 2    pantoprazole (PROTONIX) 40 MG tablet, TAKE 1 TABLET DAILY, Disp: 90 tablet, Rfl: 2    atorvastatin (LIPITOR) 40 MG tablet, TAKE 1 TABLET DAILY (Patient taking differently: TAKE 1 TABLET DAILY TAKES IN AFTERNOON), Disp: 90 tablet, Rfl: 3    metFORMIN (GLUCOPHAGE XR) 500 MG extended release tablet, Take 1 tablet by mouth Daily with supper, Disp: 90 tablet, Rfl: 3    ondansetron (ZOFRAN) 4 MG tablet, Take 1 tablet by mouth daily as needed for Nausea or Vomiting, Disp: 10 tablet, Rfl: 0    losartan (COZAAR) 100 MG tablet, TAKE 1 TABLET DAILY, Disp: 90 tablet, Rfl: 3    traMADol (ULTRAM) 50 MG tablet, Take 1 tablet by mouth every 8 hours as needed for Pain, Disp: 270 tablet, Rfl: 0    mometasone (ELOCON) 0.1 % cream, Apply topically 2 times daily as needed Apply topically daily.  ,

## 2018-08-18 LAB
CULTURE: NO GROWTH
Lab: NORMAL
SPECIMEN DESCRIPTION: NORMAL
STATUS: NORMAL

## 2018-08-31 ENCOUNTER — ANESTHESIA (OUTPATIENT)
Dept: OPERATING ROOM | Age: 83
End: 2018-08-31
Payer: MEDICARE

## 2018-08-31 ENCOUNTER — ANESTHESIA EVENT (OUTPATIENT)
Dept: OPERATING ROOM | Age: 83
End: 2018-08-31
Payer: MEDICARE

## 2018-08-31 ENCOUNTER — HOSPITAL ENCOUNTER (OUTPATIENT)
Age: 83
Setting detail: OUTPATIENT SURGERY
Discharge: HOME | End: 2018-08-31
Attending: UROLOGY | Admitting: UROLOGY
Payer: MEDICARE

## 2018-08-31 VITALS
WEIGHT: 154 LBS | DIASTOLIC BLOOD PRESSURE: 50 MMHG | HEART RATE: 60 BPM | TEMPERATURE: 97 F | BODY MASS INDEX: 27.29 KG/M2 | HEIGHT: 63 IN | RESPIRATION RATE: 14 BRPM | SYSTOLIC BLOOD PRESSURE: 158 MMHG | OXYGEN SATURATION: 92 %

## 2018-08-31 VITALS — SYSTOLIC BLOOD PRESSURE: 138 MMHG | TEMPERATURE: 94.7 F | OXYGEN SATURATION: 97 % | DIASTOLIC BLOOD PRESSURE: 42 MMHG

## 2018-08-31 DIAGNOSIS — R33.9 CHRONIC RETENTION OF URINE: Primary | ICD-10-CM

## 2018-08-31 LAB
GLUCOSE BLD-MCNC: 125 MG/DL (ref 65–105)
GLUCOSE BLD-MCNC: 180 MG/DL (ref 74–100)
POC POTASSIUM: 4.2 MMOL/L (ref 3.5–4.5)

## 2018-08-31 PROCEDURE — 3700000001 HC ADD 15 MINUTES (ANESTHESIA): Performed by: UROLOGY

## 2018-08-31 PROCEDURE — 2709999900 HC NON-CHARGEABLE SUPPLY: Performed by: UROLOGY

## 2018-08-31 PROCEDURE — C1894 INTRO/SHEATH, NON-LASER: HCPCS | Performed by: UROLOGY

## 2018-08-31 PROCEDURE — 7100000011 HC PHASE II RECOVERY - ADDTL 15 MIN: Performed by: UROLOGY

## 2018-08-31 PROCEDURE — 7100000001 HC PACU RECOVERY - ADDTL 15 MIN: Performed by: UROLOGY

## 2018-08-31 PROCEDURE — 6360000002 HC RX W HCPCS: Performed by: NURSE ANESTHETIST, CERTIFIED REGISTERED

## 2018-08-31 PROCEDURE — 2500000003 HC RX 250 WO HCPCS: Performed by: UROLOGY

## 2018-08-31 PROCEDURE — 2500000003 HC RX 250 WO HCPCS: Performed by: NURSE ANESTHETIST, CERTIFIED REGISTERED

## 2018-08-31 PROCEDURE — 3700000000 HC ANESTHESIA ATTENDED CARE: Performed by: UROLOGY

## 2018-08-31 PROCEDURE — 3600000014 HC SURGERY LEVEL 4 ADDTL 15MIN: Performed by: UROLOGY

## 2018-08-31 PROCEDURE — 6370000000 HC RX 637 (ALT 250 FOR IP): Performed by: ANESTHESIOLOGY

## 2018-08-31 PROCEDURE — 7100000010 HC PHASE II RECOVERY - FIRST 15 MIN: Performed by: UROLOGY

## 2018-08-31 PROCEDURE — 82947 ASSAY GLUCOSE BLOOD QUANT: CPT

## 2018-08-31 PROCEDURE — 7100000000 HC PACU RECOVERY - FIRST 15 MIN: Performed by: UROLOGY

## 2018-08-31 PROCEDURE — 2580000003 HC RX 258: Performed by: ANESTHESIOLOGY

## 2018-08-31 PROCEDURE — 84132 ASSAY OF SERUM POTASSIUM: CPT

## 2018-08-31 PROCEDURE — 2580000003 HC RX 258: Performed by: UROLOGY

## 2018-08-31 PROCEDURE — 3600000004 HC SURGERY LEVEL 4 BASE: Performed by: UROLOGY

## 2018-08-31 RX ORDER — DOCUSATE SODIUM 100 MG/1
100 CAPSULE, LIQUID FILLED ORAL 2 TIMES DAILY PRN
Qty: 30 CAPSULE | Refills: 1 | Status: SHIPPED | OUTPATIENT
Start: 2018-08-31

## 2018-08-31 RX ORDER — ONDANSETRON 2 MG/ML
INJECTION INTRAMUSCULAR; INTRAVENOUS PRN
Status: DISCONTINUED | OUTPATIENT
Start: 2018-08-31 | End: 2018-08-31 | Stop reason: SDUPTHER

## 2018-08-31 RX ORDER — SODIUM CHLORIDE, SODIUM LACTATE, POTASSIUM CHLORIDE, CALCIUM CHLORIDE 600; 310; 30; 20 MG/100ML; MG/100ML; MG/100ML; MG/100ML
1000 INJECTION, SOLUTION INTRAVENOUS CONTINUOUS
Status: DISCONTINUED | OUTPATIENT
Start: 2018-08-31 | End: 2018-08-31 | Stop reason: HOSPADM

## 2018-08-31 RX ORDER — SODIUM CHLORIDE, SODIUM LACTATE, POTASSIUM CHLORIDE, CALCIUM CHLORIDE 600; 310; 30; 20 MG/100ML; MG/100ML; MG/100ML; MG/100ML
INJECTION, SOLUTION INTRAVENOUS CONTINUOUS PRN
Status: DISCONTINUED | OUTPATIENT
Start: 2018-08-31 | End: 2018-08-31

## 2018-08-31 RX ORDER — CEFAZOLIN SODIUM 1 G/3ML
INJECTION, POWDER, FOR SOLUTION INTRAMUSCULAR; INTRAVENOUS PRN
Status: DISCONTINUED | OUTPATIENT
Start: 2018-08-31 | End: 2018-08-31 | Stop reason: SDUPTHER

## 2018-08-31 RX ORDER — LIDOCAINE HYDROCHLORIDE 10 MG/ML
INJECTION, SOLUTION EPIDURAL; INFILTRATION; INTRACAUDAL; PERINEURAL PRN
Status: DISCONTINUED | OUTPATIENT
Start: 2018-08-31 | End: 2018-08-31 | Stop reason: SDUPTHER

## 2018-08-31 RX ORDER — PROPOFOL 10 MG/ML
INJECTION, EMULSION INTRAVENOUS PRN
Status: DISCONTINUED | OUTPATIENT
Start: 2018-08-31 | End: 2018-08-31 | Stop reason: SDUPTHER

## 2018-08-31 RX ORDER — SULFAMETHOXAZOLE AND TRIMETHOPRIM 800; 160 MG/1; MG/1
1 TABLET ORAL 2 TIMES DAILY
Qty: 10 TABLET | Refills: 0 | Status: SHIPPED | OUTPATIENT
Start: 2018-08-31 | End: 2018-09-05

## 2018-08-31 RX ORDER — HYDROCODONE BITARTRATE AND ACETAMINOPHEN 5; 325 MG/1; MG/1
2 TABLET ORAL EVERY 6 HOURS PRN
Qty: 15 TABLET | Refills: 0 | Status: SHIPPED | OUTPATIENT
Start: 2018-08-31 | End: 2018-09-07

## 2018-08-31 RX ORDER — MAGNESIUM HYDROXIDE 1200 MG/15ML
LIQUID ORAL PRN
Status: DISCONTINUED | OUTPATIENT
Start: 2018-08-31 | End: 2018-08-31 | Stop reason: HOSPADM

## 2018-08-31 RX ORDER — FENTANYL CITRATE 50 UG/ML
INJECTION, SOLUTION INTRAMUSCULAR; INTRAVENOUS PRN
Status: DISCONTINUED | OUTPATIENT
Start: 2018-08-31 | End: 2018-08-31 | Stop reason: SDUPTHER

## 2018-08-31 RX ORDER — LIDOCAINE HYDROCHLORIDE 10 MG/ML
INJECTION, SOLUTION EPIDURAL; INFILTRATION; INTRACAUDAL; PERINEURAL PRN
Status: DISCONTINUED | OUTPATIENT
Start: 2018-08-31 | End: 2018-08-31 | Stop reason: HOSPADM

## 2018-08-31 RX ORDER — FUROSEMIDE 20 MG/1
20 TABLET ORAL DAILY
Status: DISCONTINUED | OUTPATIENT
Start: 2018-08-31 | End: 2018-08-31 | Stop reason: HOSPADM

## 2018-08-31 RX ADMIN — FENTANYL CITRATE 50 MCG: 50 INJECTION INTRAMUSCULAR; INTRAVENOUS at 13:50

## 2018-08-31 RX ADMIN — SODIUM CHLORIDE, POTASSIUM CHLORIDE, SODIUM LACTATE AND CALCIUM CHLORIDE 1000 ML: 600; 310; 30; 20 INJECTION, SOLUTION INTRAVENOUS at 13:09

## 2018-08-31 RX ADMIN — PROPOFOL 100 MG: 10 INJECTION, EMULSION INTRAVENOUS at 13:31

## 2018-08-31 RX ADMIN — FENTANYL CITRATE 50 MCG: 50 INJECTION INTRAMUSCULAR; INTRAVENOUS at 13:31

## 2018-08-31 RX ADMIN — FUROSEMIDE 20 MG: 20 TABLET ORAL at 16:30

## 2018-08-31 RX ADMIN — ONDANSETRON 4 MG: 2 INJECTION INTRAMUSCULAR; INTRAVENOUS at 13:53

## 2018-08-31 RX ADMIN — LIDOCAINE HYDROCHLORIDE 50 MG: 10 INJECTION, SOLUTION EPIDURAL; INFILTRATION; INTRACAUDAL; PERINEURAL at 13:31

## 2018-08-31 RX ADMIN — CEFAZOLIN 2000 MG: 330 INJECTION, POWDER, FOR SOLUTION INTRAMUSCULAR; INTRAVENOUS at 13:43

## 2018-08-31 ASSESSMENT — PULMONARY FUNCTION TESTS
PIF_VALUE: 3
PIF_VALUE: 2
PIF_VALUE: 1
PIF_VALUE: 6
PIF_VALUE: 6
PIF_VALUE: 2
PIF_VALUE: 2
PIF_VALUE: 6
PIF_VALUE: 0
PIF_VALUE: 12
PIF_VALUE: 24
PIF_VALUE: 6
PIF_VALUE: 6
PIF_VALUE: 0
PIF_VALUE: 13
PIF_VALUE: 6
PIF_VALUE: 5
PIF_VALUE: 6
PIF_VALUE: 0
PIF_VALUE: 6
PIF_VALUE: 0
PIF_VALUE: 2
PIF_VALUE: 6
PIF_VALUE: 3
PIF_VALUE: 6
PIF_VALUE: 13
PIF_VALUE: 0
PIF_VALUE: 6
PIF_VALUE: 10
PIF_VALUE: 6
PIF_VALUE: 6
PIF_VALUE: 18
PIF_VALUE: 6
PIF_VALUE: 6
PIF_VALUE: 0
PIF_VALUE: 7
PIF_VALUE: 3
PIF_VALUE: 24
PIF_VALUE: 9
PIF_VALUE: 6
PIF_VALUE: 0
PIF_VALUE: 0

## 2018-08-31 ASSESSMENT — PAIN SCALES - GENERAL
PAINLEVEL_OUTOF10: 0

## 2018-08-31 ASSESSMENT — PAIN - FUNCTIONAL ASSESSMENT: PAIN_FUNCTIONAL_ASSESSMENT: 0-10

## 2018-08-31 ASSESSMENT — ENCOUNTER SYMPTOMS: SHORTNESS OF BREATH: 1

## 2018-08-31 NOTE — ANESTHESIA POSTPROCEDURE EVALUATION
Department of Anesthesiology  Postprocedure Note    Patient: Yesi De Leon  MRN: [id number]  YOB: 1934  Date of evaluation: 8/31/2018  Time:  3:55 PM     Procedure Summary     Date:  08/31/18 Room / Location:  [facility] OR  / [facility] OR    Anesthesia Start:  9599 Anesthesia Stop:  5609    Procedure:  CYSTO, SP TUBE PLACEMENT (N/A ) Diagnosis:  (DECREASED URINARY STREAM, RETENTION)    Surgeon:  Patricia Laboy MD Responsible Provider:  Erna Goodman MD    Anesthesia Type:  general ASA Status:  3          Anesthesia Type: general    Leobardo Phase I: Leobardo Score: 10    Leobardo Phase II: Leobardo Score: 10    Last vitals: Reviewed and per EMR flowsheets.        Anesthesia Post Evaluation    Patient location during evaluation: PACU  Patient participation: complete - patient participated  Level of consciousness: awake and alert  Pain score: 2  Airway patency: patent  Nausea & Vomiting: no nausea and no vomiting  Complications: no  Cardiovascular status: hemodynamically stable  Respiratory status: acceptable and room air  Hydration status: stable

## 2018-08-31 NOTE — ANESTHESIA PRE PROCEDURE
R26.9    Heart disease I51.9    Heartburn R12    Hoarseness R49.0    Hypothyroidism E03.9    Mitral regurgitation I34.0    Postmenopausal atrophic vaginitis N95.2    Reflux esophagitis K21.0    Stroke syndrome (HCC) I63.9    Tinea corporis B35.4    Tricuspid regurgitation I07.1    Type 2 diabetes mellitus without complication, with long-term current use of insulin (HCC) E11.9, Z79.4    Dyspnea R06.00    History of skin cancer Z85.828    Knee injury, right, sequela S89. 91XS    Traumatic hematoma of right knee S80. 01XA    Diarrhea R19.7       Past Medical History:        Diagnosis Date    [facility]     Arthritis     RIGHT  KNEE    CAD (coronary artery disease)     Cervical cancer ([facility]     Hysterectomy    Convulsions ([facility] 10/23/2015    Full dentures     Upper only    GERD (gastroesophageal reflux disease)     History of congestive heart disease     [place] (hard of hearing)     Hyperlipidemia     Hypertension     Presence of indwelling Bourgeois catheter     Prolonged emergence from general anesthesia     S/P CABG x 3 1996    Seizure disorder ([facility] 10/23/2015    From stroke, on Dilantin. PATIENT STATES SEIZURE WAS IN 2008. ONLY HAD 1 SEIZURE.     Thyroid disease     Type II or unspecified type diabetes mellitus without mention of complication, not stated as uncontrolled     on Insulin & Metformin    Unspecified cerebral artery occlusion with cerebral infarction 1998    Slight Lt Arm and Leg Residual Numbess    Uses roller walker     Wears glasses        Past Surgical History:        Procedure Laterality Date    CARDIAC SURGERY  1996    CABG X 3 BYPASS    CAROTID ENDARTERECTOMY Right     CHOLECYSTECTOMY      COLONOSCOPY  about 2 years ago    CORONARY ARTERY BYPASS GRAFT  1996    3 vls    EYE SURGERY Bilateral 2017    CATARACTS WITH IOL PLACED    HYSTERECTOMY      NM CYSTOURETHROSCOPY N/A 7/27/2018    CYSTOSCOPY performed by Ekaterina Carbajal MD at [address] [place] [address] HCG (If Applicable): No results found for: PREGTESTUR, PREGSERUM, HCG, HCGQUANT     ABGs: No results found for: PHART, PO2ART, XCM8AMJ, YDQ4TDU, BEART, T7TKCUYO     Type & Screen (If Applicable):  No results found for: LABABO, [address]    Anesthesia Evaluation  Patient summary reviewed and Nursing notes reviewed  Airway: Mallampati: II  TM distance: >3 FB   Neck ROM: limited  Mouth opening: > = 3 FB Dental:    (+) upper dentures and lower dentures      Pulmonary:normal exam    (+) shortness of breath:                             Cardiovascular:    (+) hypertension:, past MI:, CAD:, CABG/stent:,       ECG reviewed      Echocardiogram reviewed                  Neuro/Psych:   (+) CVA:, TIA,             GI/Hepatic/Renal:   (+) GERD: well controlled,           Endo/Other:    (+) Diabetes, hypothyroidism::., .                 Abdominal:           Vascular:                                        Anesthesia Plan      general     ASA 3       Induction: intravenous. MIPS: Postoperative opioids intended and Prophylactic antiemetics administered. Anesthetic plan and risks discussed with patient. Plan discussed with CRNA and attending.     Attending anesthesiologist reviewed and agrees with Pre Eval content              ADEN Wolfe - CRNA   8/31/2018

## 2018-08-31 NOTE — H&P
H&P Update    Patient's History and Physical from August 17, 2018 was reviewed. Patient examined. There has been no change.     Ana Whitehead

## 2018-08-31 NOTE — PROGRESS NOTES
Dr. Jonathon Vera at bedside for rales. Orders.  Dr. Pricila Witt comes to bedside to talk with pt and family post op

## 2018-09-01 NOTE — OP NOTE
FACILITY:  [address] [place] [address]      DATE:  8/31/2018    SURGEON:  Meaghan Alcala M.D.     ASSISTANT:  Veronica Pope MD     PREOPERATIVE DIAGNOSIS:  Chronic retention     POSTOPERATIVE DIAGNOSIS: same     OPERATION:  Cystoscopy with 16 Vietnamese suprapubic catheter placement via Rouch  technique.     ANESTHESIA:  General.     ANTIBIOTICS:  Ancef     COMPLICATIONS:  None.     ESTIMATED BLOOD LOSS:  Minimal.     REPLACEMENTS:  Crystalloid.     IMPLANTS, EXPLANTS, ETCETERA:  A 16 Vietnamese suprapubic Lopez catheter.     SPECIMENS:  None.     INDICATIONS FOR THE PROCEDURE:  The patient is an 80-year-old female with a history of chronic retention and indwelling lopez catheter. After treatment options were discussed including risks, benefits, alternatives, goals and possible complications,  the patient elected to proceed with today's procedure.     NARRATIVE SUMMARY OF THE PROCEDURE:  After informed consent was reviewed in the preoperative area the patient  was taken back to the operating room and transferred to the operating table  in the supine position. EPC cuffs were placed and the machine was turned  on. Anesthesia was induced and the antibiotics were started. Patient was  placed in modified dorsal lithotomy position, sterilely prepped and draped  in a standard fashion. We entered the urethra with the cystoscope. We then used a spinal needle to project  our trajectory with the Rouch stylet. We could see the needle going into  the bladder. We then made a small skin incision using a 15 blade scalpel. Using a Rouch stylet we then entered the bladder under direct  visualization. The stylet was removed and the sheath was left in place and  a 16 Vietnamese Lopez catheter was inserted through the sheath and the balloon  was instilled with 10 mL of sterile water. The sheath was then broken  down. The catheter was hooked up to gravity drainage. It was stitched in  place using a 2-0 Nylon suture.  She was

## 2018-10-15 ENCOUNTER — OFFICE VISIT (OUTPATIENT)
Dept: UROLOGY | Age: 83
End: 2018-10-15
Payer: MEDICARE

## 2018-10-15 VITALS — HEART RATE: 62 BPM | SYSTOLIC BLOOD PRESSURE: 131 MMHG | DIASTOLIC BLOOD PRESSURE: 69 MMHG | TEMPERATURE: 98.3 F

## 2018-10-15 DIAGNOSIS — R31.0 GROSS HEMATURIA: ICD-10-CM

## 2018-10-15 DIAGNOSIS — R33.9 INCOMPLETE EMPTYING OF BLADDER: Primary | ICD-10-CM

## 2018-10-15 PROCEDURE — G8417 CALC BMI ABV UP PARAM F/U: HCPCS | Performed by: UROLOGY

## 2018-10-15 PROCEDURE — 51710 CHANGE OF BLADDER TUBE: CPT | Performed by: UROLOGY

## 2018-10-15 PROCEDURE — G8400 PT W/DXA NO RESULTS DOC: HCPCS | Performed by: UROLOGY

## 2018-10-15 PROCEDURE — G8427 DOCREV CUR MEDS BY ELIG CLIN: HCPCS | Performed by: UROLOGY

## 2018-10-15 PROCEDURE — 1036F TOBACCO NON-USER: CPT | Performed by: UROLOGY

## 2018-10-15 PROCEDURE — 1090F PRES/ABSN URINE INCON ASSESS: CPT | Performed by: UROLOGY

## 2018-10-15 PROCEDURE — G8482 FLU IMMUNIZE ORDER/ADMIN: HCPCS | Performed by: UROLOGY

## 2018-10-15 PROCEDURE — 4040F PNEUMOC VAC/ADMIN/RCVD: CPT | Performed by: UROLOGY

## 2018-10-15 PROCEDURE — G8598 ASA/ANTIPLAT THER USED: HCPCS | Performed by: UROLOGY

## 2018-10-15 PROCEDURE — 1123F ACP DISCUSS/DSCN MKR DOCD: CPT | Performed by: UROLOGY

## 2018-10-15 PROCEDURE — 1101F PT FALLS ASSESS-DOCD LE1/YR: CPT | Performed by: UROLOGY

## 2018-10-15 PROCEDURE — 99213 OFFICE O/P EST LOW 20 MIN: CPT | Performed by: UROLOGY

## 2018-10-15 ASSESSMENT — ENCOUNTER SYMPTOMS
COUGH: 0
WHEEZING: 0
SHORTNESS OF BREATH: 0
COLOR CHANGE: 0
BACK PAIN: 0
ABDOMINAL PAIN: 0
EYE PAIN: 0
EYE REDNESS: 0
VOMITING: 0
NAUSEA: 0

## 2018-11-12 ENCOUNTER — OFFICE VISIT (OUTPATIENT)
Dept: UROLOGY | Age: 83
End: 2018-11-12
Payer: MEDICARE

## 2018-11-12 VITALS
BODY MASS INDEX: 27.46 KG/M2 | SYSTOLIC BLOOD PRESSURE: 145 MMHG | DIASTOLIC BLOOD PRESSURE: 70 MMHG | HEART RATE: 66 BPM | TEMPERATURE: 97.9 F | WEIGHT: 155 LBS

## 2018-11-12 DIAGNOSIS — R33.9 INCOMPLETE EMPTYING OF BLADDER: Primary | ICD-10-CM

## 2018-11-12 PROCEDURE — 99999 PR OFFICE/OUTPT VISIT,PROCEDURE ONLY: CPT | Performed by: UROLOGY

## 2018-11-12 PROCEDURE — 51705 CHANGE OF BLADDER TUBE: CPT | Performed by: UROLOGY

## 2018-11-20 ENCOUNTER — OFFICE VISIT (OUTPATIENT)
Dept: UROLOGY | Age: 83
End: 2018-11-20
Payer: MEDICARE

## 2018-11-20 VITALS
BODY MASS INDEX: 26.46 KG/M2 | HEART RATE: 59 BPM | TEMPERATURE: 97.7 F | HEIGHT: 64 IN | WEIGHT: 155 LBS | SYSTOLIC BLOOD PRESSURE: 173 MMHG | DIASTOLIC BLOOD PRESSURE: 78 MMHG

## 2018-11-20 DIAGNOSIS — R33.9 INCOMPLETE EMPTYING OF BLADDER: Primary | ICD-10-CM

## 2018-11-20 DIAGNOSIS — Z98.890 HISTORY OF SUPRAPUBIC CATHETER: ICD-10-CM

## 2018-11-20 PROCEDURE — 1090F PRES/ABSN URINE INCON ASSESS: CPT | Performed by: NURSE PRACTITIONER

## 2018-11-20 PROCEDURE — 99213 OFFICE O/P EST LOW 20 MIN: CPT | Performed by: NURSE PRACTITIONER

## 2018-11-20 PROCEDURE — 1123F ACP DISCUSS/DSCN MKR DOCD: CPT | Performed by: NURSE PRACTITIONER

## 2018-11-20 PROCEDURE — G8427 DOCREV CUR MEDS BY ELIG CLIN: HCPCS | Performed by: NURSE PRACTITIONER

## 2018-11-20 PROCEDURE — G8598 ASA/ANTIPLAT THER USED: HCPCS | Performed by: NURSE PRACTITIONER

## 2018-11-20 PROCEDURE — G8482 FLU IMMUNIZE ORDER/ADMIN: HCPCS | Performed by: NURSE PRACTITIONER

## 2018-11-20 PROCEDURE — G8417 CALC BMI ABV UP PARAM F/U: HCPCS | Performed by: NURSE PRACTITIONER

## 2018-11-20 PROCEDURE — 1036F TOBACCO NON-USER: CPT | Performed by: NURSE PRACTITIONER

## 2018-11-20 PROCEDURE — G8400 PT W/DXA NO RESULTS DOC: HCPCS | Performed by: NURSE PRACTITIONER

## 2018-11-20 PROCEDURE — 4040F PNEUMOC VAC/ADMIN/RCVD: CPT | Performed by: NURSE PRACTITIONER

## 2018-11-20 PROCEDURE — 1101F PT FALLS ASSESS-DOCD LE1/YR: CPT | Performed by: NURSE PRACTITIONER

## 2018-11-20 ASSESSMENT — ENCOUNTER SYMPTOMS
WHEEZING: 0
EYE PAIN: 0
SHORTNESS OF BREATH: 0
VOMITING: 0
EYE REDNESS: 0
COLOR CHANGE: 0
NAUSEA: 0
COUGH: 0
BACK PAIN: 0
ABDOMINAL PAIN: 0

## 2018-11-20 NOTE — PROGRESS NOTES
[facility]  [facility] [place]  [address]  [address]  [address] [facility] [address]  Dept: [address] [facility] Urology Office Note - Established    Patient:  Jarrod Aleman  YOB: 1934  Date: 11/20/2018    The patient is a 80 y.o. female whopresents today for evaluation of the following problems:   Chief Complaint   Patient presents with    Urinary Retention     possible infection in SP tube had some green grey looking mucusy discharge from sp site        HPI  Here for follow up on SP tube that was place 8/2018. She had her tube changed from 14 to 16. She started green gray drainage around the insertion. Is washing site daily, daughter put a guaze around it, has no fevers or abd pain, cath is draining well. Summary of old records: N/A    Additional History: N/A    Procedures Today: N/A    Urinalysis today:  No results found for this visit on 11/20/18. Imaging Reviewed during this Office Visit:   (results were independently reviewed by physician and radiology report verified)    AUA Symptom Score (11/20/2018): Last BUN and creatinine:  Lab Results   Component Value Date    BUN 21 08/17/2018     Lab Results   Component Value Date    CREATININE 0.91 (H) 08/17/2018       Additional Lab/Culture results:     PAST MEDICAL, FAMILY AND SOCIAL HISTORY UPDATE:  Past Medical History:   Diagnosis Date    Acute MI ([address]     Arthritis     RIGHT  KNEE    CAD (coronary artery disease)     Cervical cancer ([address]     Hysterectomy    Convulsions ([address] 10/23/2015    Full dentures     Upper only    GERD (gastroesophageal reflux disease)     History of congestive heart disease     [place] (hard of hearing)     Hyperlipidemia     Hypertension     Presence of indwelling Bourgeois catheter     Prolonged emergence from general anesthesia     S/P CABG x 3 1996    Seizure disorder ([address] 10/23/2015    From stroke, on Dilantin.  PATIENT STATES SEIZURE WAS Medication Sig Dispense Refill    insulin NPH (HUMULIN N;NOVOLIN N) 100 UNIT/ML injection vial Inject 27 Units into the skin every morning Can go up to 30 units if neede 2 vial 3    Metoprolol Tartrate 75 MG TABS TAKE 1 TABLET TWICE A  tablet 0    metFORMIN (GLUCOPHAGE-XR) 500 MG extended release tablet TAKE 1 TABLET DAILY WITH SUPPER 90 tablet 0    potassium chloride (KLOR-CON M) 20 MEQ extended release tablet TAKE 1 TABLET DAILY 90 tablet 0    docusate sodium (COLACE) 100 MG capsule Take 1 capsule by mouth 2 times daily as needed for Constipation 30 capsule 1    losartan (COZAAR) 100 MG tablet TAKE 1 TABLET DAILY 90 tablet 3    pantoprazole (PROTONIX) 40 MG tablet TAKE 1 TABLET DAILY 90 tablet 3    blood glucose test strips (FREESTYLE TEST STRIPS) strip Use TID, prn due to  E11.9 100 each 3    FREESTYLE LANCETS MISC Three times daily.  E11.9 300 each 1    Insulin Syringe-Needle U-100 (B-D INS SYRINGE 0.5CC/30GX1/2\") 30G X 1/2\" 0.5 ML MISC 1 each by Does not apply route 2 times daily 100 each 2    levothyroxine (SYNTHROID) 137 MCG tablet Take 1 tablet by mouth Daily 90 tablet 3    hydrALAZINE (APRESOLINE) 50 MG tablet TAKE 1 TABLET EVERY 8 HOURS (Patient taking differently: TAKE 1 TABLET EVERY 8 HOURS,) 270 tablet 2    nitroGLYCERIN (NITRODUR) 0.2 MG/HR APPLY 1 PATCH ONTO THE SKIN DAILY, 12 HOURS ON THEN 12 HOURS OFF 90 patch 1    furosemide (LASIX) 40 MG tablet TAKE 1 TABLET DAILY 90 tablet 2    Nitroglycerin 400 MCG/SPRAY AERS PLACE 1 SPRAY UNDER THE TONGUE EVERY 5 MINUTES AS NEEDED (CHEST PAIN) FOR UP TO 3 DOSES 1 Bottle 2    clopidogrel (PLAVIX) 75 MG tablet TAKE 1 TABLET DAILY 90 tablet 3    amLODIPine (NORVASC) 10 MG tablet TAKE 1 TABLET DAILY 90 tablet 3    phenytoin (DILANTIN) 100 MG ER capsule TAKE 1 CAPSULE THREE TIMES A  capsule 3    atorvastatin (LIPITOR) 40 MG tablet TAKE 1 TABLET DAILY (Patient taking differently: TAKE 1 TABLET DAILY TAKES IN AFTERNOON) 90 tablet 3 encounter. Orders Placed:  No orders of the defined types were placed in this encounter. ADEN Burnham CNP    Review and Agree with the ROS entered by the MA.

## 2018-12-14 RX ORDER — AMLODIPINE BESYLATE 10 MG/1
TABLET ORAL
Qty: 90 TABLET | Refills: 3 | Status: SHIPPED | OUTPATIENT
Start: 2018-12-14 | End: 2019-01-09 | Stop reason: SDUPTHER

## 2018-12-27 ENCOUNTER — APPOINTMENT (OUTPATIENT)
Dept: GENERAL RADIOLOGY | Age: 83
End: 2018-12-27
Payer: MEDICARE

## 2018-12-27 ENCOUNTER — HOSPITAL ENCOUNTER (EMERGENCY)
Age: 83
Discharge: HOME | End: 2018-12-27
Attending: EMERGENCY MEDICINE
Payer: MEDICARE

## 2018-12-27 VITALS
HEART RATE: 86 BPM | HEIGHT: 62 IN | DIASTOLIC BLOOD PRESSURE: 65 MMHG | RESPIRATION RATE: 18 BRPM | SYSTOLIC BLOOD PRESSURE: 181 MMHG | TEMPERATURE: 98.2 F | OXYGEN SATURATION: 100 % | WEIGHT: 155 LBS | BODY MASS INDEX: 28.52 KG/M2

## 2018-12-27 DIAGNOSIS — T14.8XXA HEMATOMA: ICD-10-CM

## 2018-12-27 DIAGNOSIS — S60.222A CONTUSION OF LEFT HAND, INITIAL ENCOUNTER: Primary | ICD-10-CM

## 2018-12-27 PROCEDURE — 73130 X-RAY EXAM OF HAND: CPT

## 2018-12-27 PROCEDURE — 99283 EMERGENCY DEPT VISIT LOW MDM: CPT

## 2018-12-27 ASSESSMENT — ENCOUNTER SYMPTOMS
NAUSEA: 0
VOMITING: 0
SHORTNESS OF BREATH: 0
COUGH: 0
TROUBLE SWALLOWING: 0

## 2019-01-09 RX ORDER — AMLODIPINE BESYLATE 10 MG/1
TABLET ORAL
Qty: 30 TABLET | Refills: 0 | Status: SHIPPED | OUTPATIENT
Start: 2019-01-09 | End: 2019-02-08 | Stop reason: SDUPTHER

## 2019-01-15 ENCOUNTER — OFFICE VISIT (OUTPATIENT)
Dept: PRIMARY CARE CLINIC | Age: 84
End: 2019-01-15
Payer: MEDICARE

## 2019-01-15 VITALS
HEART RATE: 63 BPM | SYSTOLIC BLOOD PRESSURE: 148 MMHG | DIASTOLIC BLOOD PRESSURE: 72 MMHG | WEIGHT: 152.2 LBS | OXYGEN SATURATION: 97 % | BODY MASS INDEX: 27.84 KG/M2

## 2019-01-15 DIAGNOSIS — I10 ESSENTIAL HYPERTENSION: Chronic | ICD-10-CM

## 2019-01-15 DIAGNOSIS — E11.9 TYPE 2 DIABETES MELLITUS WITHOUT COMPLICATION, WITH LONG-TERM CURRENT USE OF INSULIN (HCC): Primary | ICD-10-CM

## 2019-01-15 DIAGNOSIS — R19.7 DIARRHEA, UNSPECIFIED TYPE: ICD-10-CM

## 2019-01-15 DIAGNOSIS — Z79.4 TYPE 2 DIABETES MELLITUS WITHOUT COMPLICATION, WITH LONG-TERM CURRENT USE OF INSULIN (HCC): Primary | ICD-10-CM

## 2019-01-15 DIAGNOSIS — E78.5 HYPERLIPIDEMIA WITH TARGET LDL LESS THAN 70: Chronic | ICD-10-CM

## 2019-01-15 PROCEDURE — G8400 PT W/DXA NO RESULTS DOC: HCPCS | Performed by: FAMILY MEDICINE

## 2019-01-15 PROCEDURE — G8417 CALC BMI ABV UP PARAM F/U: HCPCS | Performed by: FAMILY MEDICINE

## 2019-01-15 PROCEDURE — G8482 FLU IMMUNIZE ORDER/ADMIN: HCPCS | Performed by: FAMILY MEDICINE

## 2019-01-15 PROCEDURE — 1101F PT FALLS ASSESS-DOCD LE1/YR: CPT | Performed by: FAMILY MEDICINE

## 2019-01-15 PROCEDURE — 1123F ACP DISCUSS/DSCN MKR DOCD: CPT | Performed by: FAMILY MEDICINE

## 2019-01-15 PROCEDURE — 1090F PRES/ABSN URINE INCON ASSESS: CPT | Performed by: FAMILY MEDICINE

## 2019-01-15 PROCEDURE — 4040F PNEUMOC VAC/ADMIN/RCVD: CPT | Performed by: FAMILY MEDICINE

## 2019-01-15 PROCEDURE — G8598 ASA/ANTIPLAT THER USED: HCPCS | Performed by: FAMILY MEDICINE

## 2019-01-15 PROCEDURE — G8427 DOCREV CUR MEDS BY ELIG CLIN: HCPCS | Performed by: FAMILY MEDICINE

## 2019-01-15 PROCEDURE — 1036F TOBACCO NON-USER: CPT | Performed by: FAMILY MEDICINE

## 2019-01-15 PROCEDURE — 99214 OFFICE O/P EST MOD 30 MIN: CPT | Performed by: FAMILY MEDICINE

## 2019-01-15 RX ORDER — FLASH GLUCOSE SENSOR
2 KIT MISCELLANEOUS
Qty: 2 EACH | Refills: 11 | Status: SHIPPED | OUTPATIENT
Start: 2019-01-15 | End: 2019-05-06 | Stop reason: SDUPTHER

## 2019-01-15 RX ORDER — DIPHENOXYLATE HYDROCHLORIDE AND ATROPINE SULFATE 2.5; .025 MG/1; MG/1
1 TABLET ORAL 4 TIMES DAILY PRN
Qty: 120 TABLET | Refills: 1 | Status: SHIPPED | OUTPATIENT
Start: 2019-01-15 | End: 2019-07-29 | Stop reason: SDUPTHER

## 2019-01-15 RX ORDER — FLASH GLUCOSE SCANNING READER
1 EACH MISCELLANEOUS 2 TIMES DAILY
Qty: 1 DEVICE | Refills: 0 | Status: SHIPPED | OUTPATIENT
Start: 2019-01-15 | End: 2019-05-07

## 2019-01-24 LAB
AVERAGE GLUCOSE: 154
CHOLESTEROL, TOTAL: 126 MG/DL
CHOLESTEROL/HDL RATIO: 3.3
HBA1C MFR BLD: 7 %
HDLC SERPL-MCNC: 38 MG/DL (ref 35–70)
LDL CHOLESTEROL CALCULATED: 46 MG/DL (ref 0–160)
TRIGL SERPL-MCNC: 209 MG/DL
VLDLC SERPL CALC-MCNC: 42 MG/DL

## 2019-01-25 DIAGNOSIS — E78.5 HYPERLIPIDEMIA WITH TARGET LDL LESS THAN 70: Chronic | ICD-10-CM

## 2019-01-25 DIAGNOSIS — Z79.4 TYPE 2 DIABETES MELLITUS WITHOUT COMPLICATION, WITH LONG-TERM CURRENT USE OF INSULIN (HCC): ICD-10-CM

## 2019-01-25 DIAGNOSIS — E11.9 TYPE 2 DIABETES MELLITUS WITHOUT COMPLICATION, WITH LONG-TERM CURRENT USE OF INSULIN (HCC): ICD-10-CM

## 2019-02-08 RX ORDER — AMLODIPINE BESYLATE 10 MG/1
TABLET ORAL
Qty: 30 TABLET | Refills: 3 | Status: SHIPPED | OUTPATIENT
Start: 2019-02-08

## 2019-02-08 RX ORDER — METOPROLOL TARTRATE 75 MG/1
TABLET, FILM COATED ORAL
Qty: 180 TABLET | Refills: 1 | Status: SHIPPED | OUTPATIENT
Start: 2019-02-08 | End: 2019-08-26 | Stop reason: SDUPTHER

## 2019-02-11 ENCOUNTER — OFFICE VISIT (OUTPATIENT)
Dept: PODIATRY | Age: 84
End: 2019-02-11
Payer: MEDICARE

## 2019-02-11 VITALS — WEIGHT: 154 LBS | HEIGHT: 64 IN | BODY MASS INDEX: 26.29 KG/M2

## 2019-02-11 DIAGNOSIS — M79.674 PAIN OF TOES OF BOTH FEET: ICD-10-CM

## 2019-02-11 DIAGNOSIS — E11.42 DIABETIC POLYNEUROPATHY ASSOCIATED WITH TYPE 2 DIABETES MELLITUS (HCC): ICD-10-CM

## 2019-02-11 DIAGNOSIS — E11.51 TYPE 2 DIABETES MELLITUS WITH PERIPHERAL VASCULAR DISEASE (HCC): ICD-10-CM

## 2019-02-11 DIAGNOSIS — M79.675 PAIN OF TOES OF BOTH FEET: ICD-10-CM

## 2019-02-11 DIAGNOSIS — B35.1 ONYCHOMYCOSIS OF TOENAIL: Primary | ICD-10-CM

## 2019-02-11 PROCEDURE — G8417 CALC BMI ABV UP PARAM F/U: HCPCS | Performed by: PODIATRIST

## 2019-02-11 PROCEDURE — 1123F ACP DISCUSS/DSCN MKR DOCD: CPT | Performed by: PODIATRIST

## 2019-02-11 PROCEDURE — 1090F PRES/ABSN URINE INCON ASSESS: CPT | Performed by: PODIATRIST

## 2019-02-11 PROCEDURE — G8482 FLU IMMUNIZE ORDER/ADMIN: HCPCS | Performed by: PODIATRIST

## 2019-02-11 PROCEDURE — 99203 OFFICE O/P NEW LOW 30 MIN: CPT | Performed by: PODIATRIST

## 2019-02-11 PROCEDURE — 11721 DEBRIDE NAIL 6 OR MORE: CPT | Performed by: PODIATRIST

## 2019-02-11 PROCEDURE — G8400 PT W/DXA NO RESULTS DOC: HCPCS | Performed by: PODIATRIST

## 2019-02-11 PROCEDURE — G8427 DOCREV CUR MEDS BY ELIG CLIN: HCPCS | Performed by: PODIATRIST

## 2019-02-11 PROCEDURE — 1101F PT FALLS ASSESS-DOCD LE1/YR: CPT | Performed by: PODIATRIST

## 2019-02-11 PROCEDURE — 1036F TOBACCO NON-USER: CPT | Performed by: PODIATRIST

## 2019-02-11 PROCEDURE — 4040F PNEUMOC VAC/ADMIN/RCVD: CPT | Performed by: PODIATRIST

## 2019-02-11 PROCEDURE — G8598 ASA/ANTIPLAT THER USED: HCPCS | Performed by: PODIATRIST

## 2019-02-11 ASSESSMENT — ENCOUNTER SYMPTOMS
SHORTNESS OF BREATH: 0
NAUSEA: 0
BACK PAIN: 0
DIARRHEA: 0
COLOR CHANGE: 0

## 2019-02-12 RX ORDER — HYDRALAZINE HYDROCHLORIDE 50 MG/1
TABLET, FILM COATED ORAL
Qty: 270 TABLET | Refills: 3 | Status: SHIPPED | OUTPATIENT
Start: 2019-02-12 | End: 2020-03-16

## 2019-02-12 RX ORDER — PHENYTOIN SODIUM 100 MG/1
CAPSULE, EXTENDED RELEASE ORAL
Qty: 270 CAPSULE | Refills: 3 | Status: ON HOLD | OUTPATIENT
Start: 2019-02-12 | End: 2019-08-07 | Stop reason: HOSPADM

## 2019-02-21 RX ORDER — CLOPIDOGREL BISULFATE 75 MG/1
TABLET ORAL
Qty: 90 TABLET | Refills: 3 | Status: ON HOLD | OUTPATIENT
Start: 2019-02-21 | End: 2019-08-07 | Stop reason: HOSPADM

## 2019-02-21 RX ORDER — LEVOTHYROXINE SODIUM 137 UG/1
137 TABLET ORAL DAILY
Qty: 10 TABLET | Refills: 0 | Status: SHIPPED | OUTPATIENT
Start: 2019-02-21 | End: 2019-07-31 | Stop reason: SDUPTHER

## 2019-02-21 RX ORDER — NITROGLYCERIN 40 MG/1
PATCH TRANSDERMAL
Qty: 30 PATCH | Refills: 2 | Status: SHIPPED | OUTPATIENT
Start: 2019-02-21 | End: 2019-05-03 | Stop reason: SDUPTHER

## 2019-02-28 ENCOUNTER — OFFICE VISIT (OUTPATIENT)
Dept: PRIMARY CARE CLINIC | Age: 84
End: 2019-02-28
Payer: MEDICARE

## 2019-02-28 VITALS
BODY MASS INDEX: 26.47 KG/M2 | DIASTOLIC BLOOD PRESSURE: 60 MMHG | SYSTOLIC BLOOD PRESSURE: 146 MMHG | OXYGEN SATURATION: 98 % | HEART RATE: 58 BPM | WEIGHT: 154.2 LBS

## 2019-02-28 DIAGNOSIS — D48.5 NEOPLASM OF UNCERTAIN BEHAVIOR OF SKIN: Primary | ICD-10-CM

## 2019-02-28 PROCEDURE — 11311 SHAVE SKIN LESION 0.6-1.0 CM: CPT | Performed by: FAMILY MEDICINE

## 2019-02-28 ASSESSMENT — PATIENT HEALTH QUESTIONNAIRE - PHQ9
SUM OF ALL RESPONSES TO PHQ QUESTIONS 1-9: 0
1. LITTLE INTEREST OR PLEASURE IN DOING THINGS: 0
SUM OF ALL RESPONSES TO PHQ QUESTIONS 1-9: 0
2. FEELING DOWN, DEPRESSED OR HOPELESS: 0
SUM OF ALL RESPONSES TO PHQ9 QUESTIONS 1 & 2: 0

## 2019-04-12 ENCOUNTER — TELEPHONE (OUTPATIENT)
Dept: PRIMARY CARE CLINIC | Age: 84
End: 2019-04-12

## 2019-04-12 ENCOUNTER — TELEPHONE (OUTPATIENT)
Dept: UROLOGY | Age: 84
End: 2019-04-12

## 2019-04-12 DIAGNOSIS — N93.9 VAGINA BLEEDING: Primary | ICD-10-CM

## 2019-04-12 NOTE — TELEPHONE ENCOUNTER
Pepe Hill from [facility] Nurse call stating \" I have been changing Pt. SP catheter she is being re-certified for 60 days and I will be coming out every month to change her SP catheter. Everything looks good she did have vaginal issues but I spoke to PCP. Verbal understanding given call ended.

## 2019-04-16 ENCOUNTER — HOSPITAL ENCOUNTER (OUTPATIENT)
Age: 84
Setting detail: SPECIMEN
Discharge: HOME | End: 2019-04-16
Payer: MEDICARE

## 2019-04-16 DIAGNOSIS — N93.9 VAGINA BLEEDING: ICD-10-CM

## 2019-04-16 LAB
HCT VFR BLD CALC: 35 % (ref 36.3–47.1)
HEMOGLOBIN: 11.1 G/DL (ref 11.9–15.1)
MCH RBC QN AUTO: 33.6 PG (ref 25.2–33.5)
MCHC RBC AUTO-ENTMCNC: 31.7 G/DL (ref 28.4–34.8)
MCV RBC AUTO: 106.1 FL (ref 82.6–102.9)
NRBC AUTOMATED: 0 PER 100 WBC
PDW BLD-RTO: 13 % (ref 11.8–14.4)
PLATELET # BLD: 265 K/UL (ref 138–453)
PMV BLD AUTO: 11.1 FL (ref 8.1–13.5)
RBC # BLD: 3.3 M/UL (ref 3.95–5.11)
WBC # BLD: 9.8 K/UL (ref 3.5–11.3)

## 2019-04-30 ENCOUNTER — HOSPITAL ENCOUNTER (OUTPATIENT)
Age: 84
Setting detail: SPECIMEN
Discharge: HOME | End: 2019-04-30
Payer: MEDICARE

## 2019-04-30 ENCOUNTER — OFFICE VISIT (OUTPATIENT)
Dept: OBGYN CLINIC | Age: 84
End: 2019-04-30
Payer: MEDICARE

## 2019-04-30 VITALS
HEIGHT: 64 IN | DIASTOLIC BLOOD PRESSURE: 62 MMHG | BODY MASS INDEX: 26.46 KG/M2 | HEART RATE: 60 BPM | SYSTOLIC BLOOD PRESSURE: 124 MMHG | WEIGHT: 155 LBS

## 2019-04-30 DIAGNOSIS — Z11.51 SCREENING FOR HUMAN PAPILLOMAVIRUS (HPV): ICD-10-CM

## 2019-04-30 DIAGNOSIS — Z00.00 PREVENTATIVE HEALTH CARE: ICD-10-CM

## 2019-04-30 DIAGNOSIS — N95.0 PMB (POSTMENOPAUSAL BLEEDING): ICD-10-CM

## 2019-04-30 DIAGNOSIS — N95.0 PMB (POSTMENOPAUSAL BLEEDING): Primary | ICD-10-CM

## 2019-04-30 PROCEDURE — 1123F ACP DISCUSS/DSCN MKR DOCD: CPT | Performed by: NURSE PRACTITIONER

## 2019-04-30 PROCEDURE — G8400 PT W/DXA NO RESULTS DOC: HCPCS | Performed by: NURSE PRACTITIONER

## 2019-04-30 PROCEDURE — 87624 HPV HI-RISK TYP POOLED RSLT: CPT

## 2019-04-30 PROCEDURE — 88175 CYTOPATH C/V AUTO FLUID REDO: CPT

## 2019-04-30 PROCEDURE — 1036F TOBACCO NON-USER: CPT | Performed by: NURSE PRACTITIONER

## 2019-04-30 PROCEDURE — 1090F PRES/ABSN URINE INCON ASSESS: CPT | Performed by: NURSE PRACTITIONER

## 2019-04-30 PROCEDURE — 4040F PNEUMOC VAC/ADMIN/RCVD: CPT | Performed by: NURSE PRACTITIONER

## 2019-04-30 PROCEDURE — G8417 CALC BMI ABV UP PARAM F/U: HCPCS | Performed by: NURSE PRACTITIONER

## 2019-04-30 PROCEDURE — G8427 DOCREV CUR MEDS BY ELIG CLIN: HCPCS | Performed by: NURSE PRACTITIONER

## 2019-04-30 PROCEDURE — 99213 OFFICE O/P EST LOW 20 MIN: CPT | Performed by: NURSE PRACTITIONER

## 2019-04-30 PROCEDURE — G8598 ASA/ANTIPLAT THER USED: HCPCS | Performed by: NURSE PRACTITIONER

## 2019-04-30 NOTE — PROGRESS NOTES
Virginia Cates  2019    YOB: 1934          The patient was seen today. Patient is accompanied by her daughter, Zabrina Harding. Patient alert and oriented. She is here regarding vaginal bleeding X 1 day- spotting when she wiped. Visiting nurse was at the house at this time and examined patient. Patient said nurse saw small amount of blood from vagina. No further bleeding since this one episode. Hx of ? PMB over 1 year ago and follow up with DR Efrain Weber. History of hysterectomy in past: history of questionable Uterine/ cervical cancer- patient cannot remember which one. Patient has suprapubic catheter in place draining to leg bag. Placed less than 1 year ago. Denies constipation. Has diarrhea on occasion. Denies hemorrhoids. Her bowels are regular and she is voiding without difficulty. HPI:  Virginia Cates is a 80 y.o. female [id number]     PMB      OB History    Para Term  AB Living   4 3 3 0 1 3   SAB TAB Ectopic Molar Multiple Live Births   1 0 0 0 0 0      # Outcome Date GA Lbr Gene/2nd Weight Sex Delivery Anes PTL Lv   4 SAB            3 Term            2 Term            1 Term                Past Medical History:   Diagnosis Date    Acute MI ([address]     Arthritis     RIGHT  KNEE    CAD (coronary artery disease)     Cervical cancer ([address]     Hysterectomy    Convulsions ([address] 10/23/2015    Full dentures     Upper only    GERD (gastroesophageal reflux disease)     History of congestive heart disease     [place] (hard of hearing)     Hyperlipidemia     Hypertension     Presence of indwelling Borugeois catheter     Prolonged emergence from general anesthesia     S/P CABG x 3     Seizure disorder ([address] 10/23/2015    From stroke, on Dilantin. PATIENT STATES SEIZURE WAS IN . ONLY HAD 1 SEIZURE.     Thyroid disease     Type II or unspecified type diabetes mellitus without mention of complication, not stated as uncontrolled     on Insulin & Not on file    Food insecurity:     Worry: Not on file     Inability: Not on file    Transportation needs:     Medical: Not on file     Non-medical: Not on file   Tobacco Use    Smoking status: Never Smoker    Smokeless tobacco: Never Used   Substance and Sexual Activity    Alcohol use: No    Drug use: No    Sexual activity: Never   Lifestyle    Physical activity:     Days per week: Not on file     Minutes per session: Not on file    Stress: Not on file   Relationships    Social connections:     Talks on phone: Not on file     Gets together: Not on file     Attends Mandaeism service: Not on file     Active member of club or organization: Not on file     Attends meetings of clubs or organizations: Not on file     Relationship status: Not on file    Intimate partner violence:     Fear of current or ex partner: Not on file     Emotionally abused: Not on file     Physically abused: Not on file     Forced sexual activity: Not on file   Other Topics Concern    Not on file   Social History Narrative    Not on file         MEDICATIONS:  Current Outpatient Medications   Medication Sig Dispense Refill    clopidogrel (PLAVIX) 75 MG tablet TAKE 1 TABLET DAILY 90 tablet 3    nitroGLYCERIN (NITRODUR) 0.2 MG/HR APPLY 1 PATCH ONTO THE SKIN DAILY, 12 HOURS ON THEN 12 HOURS OFF 30 patch 2    levothyroxine (SYNTHROID) 137 MCG tablet Take 1 tablet by mouth Daily 10 tablet 0    phenytoin (DILANTIN) 100 MG ER capsule TAKE 1 CAPSULE THREE TIMES A  capsule 3    hydrALAZINE (APRESOLINE) 50 MG tablet TAKE 1 TABLET EVERY 8 HOURS 270 tablet 3    metoprolol tartrate (LOPRESSOR) 25 MG tablet       amLODIPine (NORVASC) 10 MG tablet TAKE 1 TABLET DAILY 30 tablet 3    Metoprolol Tartrate 75 MG TABS TAKE 1 TABLET TWICE A  tablet 1    blood glucose test strips (FREESTYLE TEST STRIPS) strip Use TID, prn due to  E11.9 100 each 3    Continuous Blood Gluc  (FREESTYLE SHERMAN 14 DAY READER) JAQUELIN 1 Device by Does not apply route 2 times daily 1 Device 0    Continuous Blood Gluc Sensor (FREESTYLE SHERMAN 14 DAY SENSOR) MISC 2 Devices by Does not apply route every 14 days 2 each 11    metFORMIN (GLUCOPHAGE-XR) 500 MG extended release tablet TAKE 1 TABLET DAILY WITH SUPPER 90 tablet 2    furosemide (LASIX) 40 MG tablet TAKE 1 TABLET DAILY 90 tablet 2    potassium chloride (KLOR-CON M) 20 MEQ extended release tablet TAKE 1 TABLET DAILY 90 tablet 2    atorvastatin (LIPITOR) 40 MG tablet TAKE 1 TABLET DAILY 90 tablet 3    insulin NPH (HUMULIN N;NOVOLIN N) 100 UNIT/ML injection vial Inject 27 Units into the skin every morning Can go up to 30 units if neede 2 vial 3    docusate sodium (COLACE) 100 MG capsule Take 1 capsule by mouth 2 times daily as needed for Constipation 30 capsule 1    losartan (COZAAR) 100 MG tablet TAKE 1 TABLET DAILY 90 tablet 3    pantoprazole (PROTONIX) 40 MG tablet TAKE 1 TABLET DAILY 90 tablet 3    FREESTYLE LANCETS MISC Three times daily. E11.9 300 each 1    Insulin Syringe-Needle U-100 (B-D INS SYRINGE 0.5CC/30GX1/2\") 30G X 1/2\" 0.5 ML MISC 1 each by Does not apply route 2 times daily 100 each 2    Nitroglycerin 400 MCG/SPRAY AERS PLACE 1 SPRAY UNDER THE TONGUE EVERY 5 MINUTES AS NEEDED (CHEST PAIN) FOR UP TO 3 DOSES 1 Bottle 2    mometasone (ELOCON) 0.1 % cream Apply topically 2 times daily as needed Apply topically daily.  acetaminophen (TYLENOL) 500 MG tablet Take 2 tablets by mouth every 6 hours as needed. 120 tablet 3    aspirin 325 MG tablet Take 325 mg by mouth daily. No current facility-administered medications for this visit. ALLERGIES:  Allergies as of 04/30/2019    (No Known Allergies)         REVIEW OF SYSTEMS:    yes   A minimum of an eleven point review of systems was completed. Review Of Systems (11 point):  Constitutional: No fever, chills or malaise;  No weight change or fatigue  Head and Eyes: No vision, Headache, Dizziness or trauma in last 12 months  ENT ROS: No hearing, Tinnitis, sinus or taste problems  Hematological and Lymphatic ROS:No Lymphoma, Von Willebrand's, Hemophillia or Bleeding History  Psych ROS: No Depression, Homicidal thoughts,suicidal thoughts, or anxiety  Breast ROS: No prior breast abnormalities or lumps  Respiratory ROS: No SOB, Pneumoniae,Cough, or Pulmonary Embolism History  Cardiovascular ROS: No Chest Pain with Exertion, Palpitations, Syncope, Edema, Arrhythmia  Gastrointestinal ROS: No Indigestion, Heartburn, Nausea, vomiting, Diarrhea, Constipation,or Bowel Changes; No Bloody Stools or melena  Genito-Urinary ROS: No Dysuria, Hematuria or Nocturia. No Urinary Incontinence or Vaginal Discharge. + PMB  Musculoskeletal ROS: No Arthralgia, Arthritis,Gout,Osteoporosis or Rheumatism  Neurological ROS: No CVA, Migraines, Epilepsy, Seizure Hx, or Limb Weakness  Dermatological ROS: No Rash, Itching, Hives, Mole Changes or Cancer          Blood pressure 124/62, pulse 60, height 5' 4\" (1.626 m), weight 155 lb (70.3 kg), not currently breastfeeding. Abdomen: Soft non-tender; good bowel sounds. No guarding, rebound or rigidity. No CVA tenderness bilaterally. Extremities: No calf tenderness, DTR 2/4, and No edema bilaterally    Pelvic: Vulva and vagina appear atrophic. Bimanual exam reveals normal cuff intact. No visible lesions, growths, lacerations noted to perineum or vagina. Pap smear collected. External genitalia: normal general appearance, hair loss and fat pad loss    Diagnostics:  No results found.     Lab Results:  Results for orders placed or performed during the hospital encounter of 04/16/19   CBC   Result Value Ref Range    WBC 9.8 3.5 - 11.3 k/uL    RBC 3.30 (L) 3.95 - 5.11 m/uL    Hemoglobin 11.1 (L) 11.9 - 15.1 g/dL    Hematocrit 35.0 (L) 36.3 - 47.1 %    .1 (H) 82.6 - 102.9 fL    MCH 33.6 (H) 25.2 - 33.5 pg    MCHC 31.7 28.4 - 34.8 g/dL    RDW 13.0 11.8 - 14.4 %    Platelets 978 903 - 493 k/uL    MPV 11.1 8.1 exam:     Answer:   PMB    US Non OB Transvaginal     Standing Status:   Future     Standing Expiration Date:   10/30/2019     Order Specific Question:   Reason for exam:     Answer:   PMB    PAP SMEAR     Patient History:    No LMP recorded. Patient has had a hysterectomy. OBGYN Status: Hysterectomy  Past Surgical History:  1996: CARDIAC SURGERY      Comment:  CABG X 3 BYPASS  No date: CAROTID ENDARTERECTOMY; Right  No date: CHOLECYSTECTOMY  about 2 years ago: COLONOSCOPY  1996: CORONARY ARTERY BYPASS GRAFT      Comment:  3 vls  08/31/2018: CYSTOSCOPY      Comment:  CYSTO, SP TUBE PLACEMENT  2017: EYE SURGERY; Bilateral      Comment:  CATARACTS WITH IOL PLACED  No date: HYSTERECTOMY  7/27/2018: [place] CYSTOURETHROSCOPY; N/A      Comment:  CYSTOSCOPY performed by Mike Dodge MD at [place] [address]  7/27/2018: [place] INJECTION FOR BLADDER X-RAY; N/A      Comment:  VIDEO  URODYNAMICS performed by Mike Dodge MD at                [place] [address]  8/31/2018: [place] OFFICE/OUTPT VISIT,PROCEDURE ONLY; N/A      Comment:  CYSTO, SP TUBE PLACEMENT performed by Mike Dodge MD               at [place] [address]  8/22/2017: [address]; Left      Comment:  EYE CATARACT EMULSIFICATION IOL IMPLANT performed by                Shola Deleon MD at [id number]  9/5/2017: [place] REMV CATARACT EXTRACAP,INSERT LENS; Right      Comment:  EYE CATARACT EMULSIFICATION IOL IMPLANT performed by                Shola Deleon MD at [id number]  No date: TONSILLECTOMY    Problem List       Edg Problems Affecting Cytology    Carcinoma of uterus [facility])     Social History    Tobacco Use      Smoking status: Never Smoker      Smokeless tobacco: Never Used       Standing Status:   Future     Standing Expiration Date:   4/29/2020     Order Specific Question:   Collection Type     Answer:    Thin Prep     Order Specific Question:   Prior Abnormal Pap Test     Answer:   No     Order Specific Question:   Screening or Diagnostic     Answer:   Screening Order Specific Question:   HPV Requested? Answer:   Yes     Order Specific Question:   High Risk Patient     Answer:   N/A       Patient was seen with total face to face time of 15 minutes. More than 50% of this visit was counseling and education regarding The primary encounter diagnosis was PMB (postmenopausal bleeding). A diagnosis of Preventative health care was also pertinent to this visit. and Vaginal Bleeding   as well as  counseling on preventative health maintenance follow-up.

## 2019-05-01 LAB
HPV SAMPLE: NORMAL
HPV, GENOTYPE 16: NOT DETECTED
HPV, GENOTYPE 18: NOT DETECTED
HPV, HIGH RISK OTHER: NOT DETECTED
HPV, INTERPRETATION: NORMAL
SPECIMEN DESCRIPTION: NORMAL

## 2019-05-03 ENCOUNTER — TELEPHONE (OUTPATIENT)
Dept: PRIMARY CARE CLINIC | Age: 84
End: 2019-05-03

## 2019-05-03 DIAGNOSIS — E11.9 TYPE 2 DIABETES MELLITUS WITHOUT COMPLICATION, WITH LONG-TERM CURRENT USE OF INSULIN (HCC): Primary | ICD-10-CM

## 2019-05-03 DIAGNOSIS — Z79.4 TYPE 2 DIABETES MELLITUS WITHOUT COMPLICATION, WITH LONG-TERM CURRENT USE OF INSULIN (HCC): Primary | ICD-10-CM

## 2019-05-03 LAB — CYTOLOGY REPORT: NORMAL

## 2019-05-03 RX ORDER — NITROGLYCERIN 40 MG/1
PATCH TRANSDERMAL
Qty: 90 PATCH | Refills: 2 | Status: SHIPPED | OUTPATIENT
Start: 2019-05-03 | End: 2020-03-09

## 2019-05-03 RX ORDER — FLASH GLUCOSE SENSOR
1 KIT MISCELLANEOUS
Qty: 2 EACH | Refills: 5 | Status: SHIPPED | OUTPATIENT
Start: 2019-05-03 | End: 2019-05-06

## 2019-05-06 ENCOUNTER — OFFICE VISIT (OUTPATIENT)
Dept: OBGYN CLINIC | Age: 84
End: 2019-05-06
Payer: MEDICARE

## 2019-05-06 DIAGNOSIS — Z79.4 TYPE 2 DIABETES MELLITUS WITHOUT COMPLICATION, WITH LONG-TERM CURRENT USE OF INSULIN (HCC): ICD-10-CM

## 2019-05-06 DIAGNOSIS — E11.9 TYPE 2 DIABETES MELLITUS WITHOUT COMPLICATION, WITH LONG-TERM CURRENT USE OF INSULIN (HCC): ICD-10-CM

## 2019-05-06 DIAGNOSIS — N95.0 PMB (POSTMENOPAUSAL BLEEDING): ICD-10-CM

## 2019-05-06 PROCEDURE — 76830 TRANSVAGINAL US NON-OB: CPT | Performed by: OBSTETRICS & GYNECOLOGY

## 2019-05-06 PROCEDURE — 76856 US EXAM PELVIC COMPLETE: CPT | Performed by: OBSTETRICS & GYNECOLOGY

## 2019-05-06 RX ORDER — FLASH GLUCOSE SENSOR
2 KIT MISCELLANEOUS
Qty: 2 EACH | Refills: 11 | Status: SHIPPED | OUTPATIENT
Start: 2019-05-06 | End: 2019-05-07

## 2019-05-07 ENCOUNTER — TELEPHONE (OUTPATIENT)
Dept: PRIMARY CARE CLINIC | Age: 84
End: 2019-05-07

## 2019-05-07 DIAGNOSIS — Z79.4 TYPE 2 DIABETES MELLITUS WITHOUT COMPLICATION, WITH LONG-TERM CURRENT USE OF INSULIN (HCC): Primary | ICD-10-CM

## 2019-05-07 DIAGNOSIS — E11.9 TYPE 2 DIABETES MELLITUS WITHOUT COMPLICATION, WITH LONG-TERM CURRENT USE OF INSULIN (HCC): Primary | ICD-10-CM

## 2019-05-07 NOTE — TELEPHONE ENCOUNTER
Pt states her ins will not cover the Ro. Wants to know if you would send over the Free Style lite Meter with 31 maryam needles and Lancets. Pt test tid. [facility] listed.

## 2019-05-14 DIAGNOSIS — Z79.4 TYPE 2 DIABETES MELLITUS WITHOUT COMPLICATION, WITH LONG-TERM CURRENT USE OF INSULIN (HCC): Primary | ICD-10-CM

## 2019-05-14 DIAGNOSIS — E11.9 TYPE 2 DIABETES MELLITUS WITHOUT COMPLICATION, WITH LONG-TERM CURRENT USE OF INSULIN (HCC): Primary | ICD-10-CM

## 2019-05-22 ENCOUNTER — OFFICE VISIT (OUTPATIENT)
Dept: UROLOGY | Age: 84
End: 2019-05-22
Payer: MEDICARE

## 2019-05-22 VITALS
HEIGHT: 64 IN | DIASTOLIC BLOOD PRESSURE: 62 MMHG | WEIGHT: 153.8 LBS | TEMPERATURE: 97.4 F | SYSTOLIC BLOOD PRESSURE: 155 MMHG | HEART RATE: 59 BPM | BODY MASS INDEX: 26.26 KG/M2

## 2019-05-22 DIAGNOSIS — N39.0 RECURRENT UTI: ICD-10-CM

## 2019-05-22 DIAGNOSIS — Z98.890 HISTORY OF SUPRAPUBIC CATHETER: Primary | ICD-10-CM

## 2019-05-22 PROCEDURE — 4040F PNEUMOC VAC/ADMIN/RCVD: CPT | Performed by: NURSE PRACTITIONER

## 2019-05-22 PROCEDURE — G8417 CALC BMI ABV UP PARAM F/U: HCPCS | Performed by: NURSE PRACTITIONER

## 2019-05-22 PROCEDURE — 1036F TOBACCO NON-USER: CPT | Performed by: NURSE PRACTITIONER

## 2019-05-22 PROCEDURE — G8598 ASA/ANTIPLAT THER USED: HCPCS | Performed by: NURSE PRACTITIONER

## 2019-05-22 PROCEDURE — 1090F PRES/ABSN URINE INCON ASSESS: CPT | Performed by: NURSE PRACTITIONER

## 2019-05-22 PROCEDURE — G8400 PT W/DXA NO RESULTS DOC: HCPCS | Performed by: NURSE PRACTITIONER

## 2019-05-22 PROCEDURE — 1123F ACP DISCUSS/DSCN MKR DOCD: CPT | Performed by: NURSE PRACTITIONER

## 2019-05-22 PROCEDURE — 99213 OFFICE O/P EST LOW 20 MIN: CPT | Performed by: NURSE PRACTITIONER

## 2019-05-22 PROCEDURE — G8427 DOCREV CUR MEDS BY ELIG CLIN: HCPCS | Performed by: NURSE PRACTITIONER

## 2019-05-22 ASSESSMENT — ENCOUNTER SYMPTOMS
NAUSEA: 0
WHEEZING: 0
SHORTNESS OF BREATH: 0
COUGH: 0
EYE PAIN: 0
VOMITING: 0
DIARRHEA: 0
CONSTIPATION: 0
ABDOMINAL PAIN: 0
BACK PAIN: 0
EYE REDNESS: 0

## 2019-05-22 NOTE — PROGRESS NOTES
[facility]  [facility]OLOGY SPECIALISTS - [place]  [address]  [address]  [address] [facility] [address]  Dept: 92 Tika Hickey [facility] Urology Office Note - Established    Patient:  Norma Brandon  YOB: 1934  Date: 5/22/2019    The patient is a 80 y.o. female whopresents today for evaluation of the following problems:   Chief Complaint   Patient presents with    Follow-up     6 month follow up       HPI  Here with her daughter for follow up on SPT- 8/31/18. Her home health aide is changing SP monthly with no difficulty. She has had no infections. Doing qwell. Summary of old records: N/A    Additional History: N/A    Procedures Today: N/A    Urinalysis today:  No results found for this visit on 05/22/19.     Imaging Reviewed during this Office Visit(results were independently reviewed by physician and radiology report verified)    AUA Symptom Score (5/22/2019):  INCOMPLETE EMPTYING: How often have you had the sensation of not emptying your bladder?: Not at all(has cath)  FREQUENCY: How often do you have to urinate less than every two hours?: Not at all  INTERMITTENCY: How often have you found you stopped and started again several times when you urinated?: Not at all  URGENCY: How often have you found it difficult to postpone urination?: Not at all  WEAK STREAM: How often have you had a weak urinary stream?: Not at all  STRAINING: How often have you had to strain to start  urination?: Not at all  @(5505)@  TOTAL I-PSS SCORE[de-identified] 0  How would you feel if you were to spend the rest of your life with your urinary condition?: Delighted    Last BUN and creatinine:  Lab Results   Component Value Date    BUN 21 08/17/2018     Lab Results   Component Value Date    CREATININE 0.91 (H) 08/17/2018       Additional Lab/Culture results:    PAST MEDICAL, FAMILY AND SOCIAL HISTORY UPDATE:  Past Medical History:   Diagnosis Date    Acute MI ([address]     Arthritis     RIGHT  KNEE    CAD (coronary artery Vital signs: Stable  Pain/comfort: No signs of discomfort  Nutrition: Breast and bottle feeding on demand; blood sugars stable  Output: Voiding and stooling  Social: Parents present and supportive; gave first bath  Plan: Continue oral feeds on demand; decreasing diazoxide tomorrow     disease)     [facility]     Hysterectomy    Convulsions ([facility] 10/23/2015    Full dentures     Upper only    GERD (gastroesophageal reflux disease)     History of congestive heart disease     [place] (hard of hearing)     Hyperlipidemia     Hypertension     Presence of indwelling Bourgeois catheter     Prolonged emergence from general anesthesia     S/P CABG x 3 1996    Seizure disorder ([facility] 10/23/2015    From stroke, on Dilantin. PATIENT STATES SEIZURE WAS IN 2008. ONLY HAD 1 SEIZURE.     Thyroid disease     Type II or unspecified type diabetes mellitus without mention of complication, not stated as uncontrolled     on Insulin & Metformin    Unspecified cerebral artery occlusion with cerebral infarction 1998    Slight Lt Arm and Leg Residual Numbess    Uses roller walker     Wears glasses      Past Surgical History:   Procedure Laterality Date    CARDIAC SURGERY  1996    CABG X 3 BYPASS    CAROTID ENDARTERECTOMY Right     CHOLECYSTECTOMY      COLONOSCOPY  about 2 years ago   [address]    3 vls    CYSTOSCOPY  08/31/2018    CYSTO, SP TUBE PLACEMENT    EYE SURGERY Bilateral 2017    CATARACTS WITH IOL PLACED    HYSTERECTOMY      NH CYSTOURETHROSCOPY N/A 7/27/2018    CYSTOSCOPY performed by Marisela Allen MD at [facility] N/A 7/27/2018    VIDEO  URODYNAMICS performed by Marisela Allen MD at [address] [place] [address] OFFICE/OUTPT VISIT,PROCEDURE ONLY N/A 8/31/2018    CYSTO, SP TUBE PLACEMENT performed by Marisela Allen MD at [facility]. 199 Left 8/22/2017    EYE CATARACT EMULSIFICATION [facility] Right 9/5/2017    EYE CATARACT EMULSIFICATION IOL IMPLANT performed by Rudy Cruz MD at [facility]       Family History   Problem Relation Age of Onset    Diabetes Father     Heart Attack Father     Heart Disease Father     Heart Attack Sister     High Blood Pressure Mother     Heart Attack Brother     No Known Problems Maternal Grandmother     No Known Problems Maternal Grandfather     No Known Problems Paternal Grandmother     No Known Problems Paternal Grandfather     High Blood Pressure Daughter     Diabetes Daughter     No Known Problems Son      Outpatient Medications Marked as Taking for the 5/22/19 encounter (Office Visit) with ADEN Rosales CNP   Medication Sig Dispense Refill    Insulin Pen Needle 31G X 8 MM MISC Inject 1 each into the skin daily 100 each 3    blood glucose test strips (ASCENSIA AUTODISC VI;ONE TOUCH ULTRA TEST VI) strip Use with associated glucose meter: Free style Lite, test  strip 11    nitroGLYCERIN (NITRODUR) 0.2 MG/HR APPLY 1 PATCH ONTO THE SKIN DAILY, 12 HOURS ON THEN 12 HOURS OFF 90 patch 2    clopidogrel (PLAVIX) 75 MG tablet TAKE 1 TABLET DAILY 90 tablet 3    levothyroxine (SYNTHROID) 137 MCG tablet Take 1 tablet by mouth Daily 10 tablet 0    phenytoin (DILANTIN) 100 MG ER capsule TAKE 1 CAPSULE THREE TIMES A  capsule 3    hydrALAZINE (APRESOLINE) 50 MG tablet TAKE 1 TABLET EVERY 8 HOURS 270 tablet 3    metoprolol tartrate (LOPRESSOR) 25 MG tablet       amLODIPine (NORVASC) 10 MG tablet TAKE 1 TABLET DAILY 30 tablet 3    Metoprolol Tartrate 75 MG TABS TAKE 1 TABLET TWICE A  tablet 1    blood glucose test strips (FREESTYLE TEST STRIPS) strip Use TID, prn due to  E11.9 100 each 3    metFORMIN (GLUCOPHAGE-XR) 500 MG extended release tablet TAKE 1 TABLET DAILY WITH SUPPER 90 tablet 2    furosemide (LASIX) 40 MG tablet TAKE 1 TABLET DAILY 90 tablet 2    potassium chloride (KLOR-CON M) 20 MEQ extended release tablet TAKE 1 TABLET DAILY 90 tablet 2    atorvastatin (LIPITOR) 40 MG tablet TAKE 1 TABLET DAILY 90 tablet 3    insulin NPH (HUMULIN N;NOVOLIN N) 100 UNIT/ML injection vial Inject 27 Units into the skin every morning Can go up to 30 units if neede 2 vial 3    docusate sodium (COLACE) 100 MG capsule Take 1 capsule by mouth 2 times daily as needed for Constipation 30 capsule 1    losartan (COZAAR) 100 MG tablet TAKE 1 TABLET DAILY 90 tablet 3    pantoprazole (PROTONIX) 40 MG tablet TAKE 1 TABLET DAILY 90 tablet 3    FREESTYLE LANCETS MISC Three times daily. E11.9 300 each 1    Nitroglycerin 400 MCG/SPRAY AERS PLACE 1 SPRAY UNDER THE TONGUE EVERY 5 MINUTES AS NEEDED (CHEST PAIN) FOR UP TO 3 DOSES 1 Bottle 2    mometasone (ELOCON) 0.1 % cream Apply topically 2 times daily as needed Apply topically daily.  acetaminophen (TYLENOL) 500 MG tablet Take 2 tablets by mouth every 6 hours as needed. 120 tablet 3    aspirin 325 MG tablet Take 325 mg by mouth daily. (All medications reviewed and updated by provider sincelast office visit or hospitalization)   Patient has no known allergies. Social History     Tobacco Use   Smoking Status Never Smoker   Smokeless Tobacco Never Used      (If patient a smoker, smoking cessation counseling offered)     Social History     Substance and Sexual Activity   Alcohol Use No       REVIEW OF SYSTEMS:  Review of Systems      Physical Exam:      Vitals:    05/22/19 1330   BP: (!) 155/62   Pulse: 59   Temp:      Body mass index is 26.4 kg/m². Patient is a 80 y.o. female in noacute distress and alert and oriented to person, place and time. Physical Exam  Constitutional: Patient in no acute distress. Neuro: Alert andoriented to person, place and time. Psych: Mood normal, affect normal  Skin:warm, pink,  No rash noted  HEENT: Head: Normocephalic and atraumatic  Conjunctivae and EOM are normal. Pupils are equal, round  Nose: Normal  Right External Ear: Normal; Left External Ear: Normal  Mouth: Mucosa Moist  Neck: Supple  Lungs: Respiratory effort is normal  Cardiovascular: strong and reg. Abdomen: Soft, non-tender, non-distended. Bladder non-tender and not distended.  SP to gravity drng yellow, SP site clean, no drng, no skin breakdown. Musculoskeletal: Normal gait and station      Assessment and Plan      1. History of suprapubic catheter    2. Recurrent UTI           Plan:    continue SP cath monthly per homecare, continue daily site care    May rinse bag with water vinegar as discussed    Call with any concerns of burning, blood or bladder pain    1 yr follow up       No follow-ups on file. Prescriptions Ordered:  No orders of the defined types were placed in this encounter. Orders Placed:  No orders of the defined types were placed in this encounter. Clifm Baumgarten, APRN - CNP    Agree with the ROS entered by the MA.

## 2019-05-22 NOTE — PATIENT INSTRUCTIONS
continue SP cath monthly per homecare, continue daily site care    May rinse bag with water vinegar as discussed    Call with any concerns of burning, blood or bladder pain    1 yr follow up

## 2019-05-22 NOTE — LETTER
[facility]  [facility]OLOGY SPECIALISTS [facility] [place]  [address] [address]  [address] [address]  Dept: [phone]  Dept Fax: [phone]        5/22/19    Patient: Brittani Dominguez  YOB: 1934    Dear Viri Girard MD,    I had the pleasure of seeing one of your patients, Aundrea Wang today in the office today. Below are the relevant portions of my assessment and plan of care. IMPRESSION:     1. History of suprapubic catheter    2. Recurrent UTI        PLAN:   continue SP cath monthly per homecare, continue daily site care    May rinse bag with water vinegar as discussed    Call with any concerns of burning, blood or bladder pain    1 yr follow up             Thank you for allowing me to participate in the care of this patient. I will keep you updated on this patient's follow up and I look forward to serving you and your patients again in the future.     Gillermina Bumpers, ADEN - CNP

## 2019-06-24 ENCOUNTER — OFFICE VISIT (OUTPATIENT)
Dept: PODIATRY | Age: 84
End: 2019-06-24
Payer: MEDICARE

## 2019-06-24 VITALS — BODY MASS INDEX: 26.29 KG/M2 | HEIGHT: 64 IN | WEIGHT: 154 LBS

## 2019-06-24 DIAGNOSIS — M79.672 PAIN IN LEFT FOOT: ICD-10-CM

## 2019-06-24 DIAGNOSIS — S90.32XA CONTUSION OF LEFT FOOT, INITIAL ENCOUNTER: Primary | ICD-10-CM

## 2019-06-24 DIAGNOSIS — B35.1 ONYCHOMYCOSIS OF TOENAIL: ICD-10-CM

## 2019-06-24 DIAGNOSIS — E11.42 DIABETIC POLYNEUROPATHY ASSOCIATED WITH TYPE 2 DIABETES MELLITUS (HCC): ICD-10-CM

## 2019-06-24 DIAGNOSIS — R60.0 EDEMA OF LOWER EXTREMITY: ICD-10-CM

## 2019-06-24 DIAGNOSIS — M79.674 PAIN OF TOES OF BOTH FEET: ICD-10-CM

## 2019-06-24 DIAGNOSIS — E11.51 TYPE 2 DIABETES MELLITUS WITH PERIPHERAL VASCULAR DISEASE (HCC): ICD-10-CM

## 2019-06-24 DIAGNOSIS — M79.675 PAIN OF TOES OF BOTH FEET: ICD-10-CM

## 2019-06-24 PROBLEM — C54.1 CARCINOMA OF ENDOMETRIUM (HCC): Status: ACTIVE | Noted: 2017-09-25

## 2019-06-24 PROBLEM — R39.81 FUNCTIONAL URINARY INCONTINENCE: Status: ACTIVE | Noted: 2018-01-17

## 2019-06-24 PROBLEM — I25.10 CORONARY ATHEROSCLEROSIS: Status: ACTIVE | Noted: 2018-01-17

## 2019-06-24 PROBLEM — R07.9 CHEST PAIN: Status: ACTIVE | Noted: 2018-04-14

## 2019-06-24 PROBLEM — J18.9 BILATERAL PNEUMONIA: Status: ACTIVE | Noted: 2017-11-25

## 2019-06-24 PROBLEM — I65.23 BILATERAL CAROTID ARTERY STENOSIS: Status: ACTIVE | Noted: 2017-09-25

## 2019-06-24 PROBLEM — R53.1 WEAKNESS: Status: ACTIVE | Noted: 2018-01-17

## 2019-06-24 PROCEDURE — G8427 DOCREV CUR MEDS BY ELIG CLIN: HCPCS | Performed by: PODIATRIST

## 2019-06-24 PROCEDURE — 1036F TOBACCO NON-USER: CPT | Performed by: PODIATRIST

## 2019-06-24 PROCEDURE — 11721 DEBRIDE NAIL 6 OR MORE: CPT | Performed by: PODIATRIST

## 2019-06-24 PROCEDURE — G8598 ASA/ANTIPLAT THER USED: HCPCS | Performed by: PODIATRIST

## 2019-06-24 PROCEDURE — 4040F PNEUMOC VAC/ADMIN/RCVD: CPT | Performed by: PODIATRIST

## 2019-06-24 PROCEDURE — 1090F PRES/ABSN URINE INCON ASSESS: CPT | Performed by: PODIATRIST

## 2019-06-24 PROCEDURE — 1123F ACP DISCUSS/DSCN MKR DOCD: CPT | Performed by: PODIATRIST

## 2019-06-24 PROCEDURE — 99213 OFFICE O/P EST LOW 20 MIN: CPT | Performed by: PODIATRIST

## 2019-06-24 PROCEDURE — G8400 PT W/DXA NO RESULTS DOC: HCPCS | Performed by: PODIATRIST

## 2019-06-24 PROCEDURE — G8417 CALC BMI ABV UP PARAM F/U: HCPCS | Performed by: PODIATRIST

## 2019-06-24 ASSESSMENT — ENCOUNTER SYMPTOMS
NAUSEA: 0
BACK PAIN: 0
SHORTNESS OF BREATH: 0
COLOR CHANGE: 0
DIARRHEA: 0

## 2019-06-24 NOTE — PROGRESS NOTES
palpation to the lesser metatarsals and toes of the left foot. There is no subluxation or dislocation noted clinically. There is no instability noted to the left foot with manipulation. There is pain with manipulation of the left foot. X-ray's taken: AP, Lateral, and Medial Oblique of the left foot. Findings: There is no evidence of fracture or stress fracture, subluxation or dislocation noted to the left foot. The right DP pulse is palpable. The left DP pulse is palpable. The right PT pulse is not palpable. The left PT pulse is not palpable. Protective sensation is present to the right plantar foot as noted with a 5.07 [place] monofilament. Protective sensation is absent to the left plantar foot as noted with a 5.07 [place] monofilament. Glucose: 140 mg/dl. ASSESSMENT:    Diagnosis Orders   1. Contusion of left foot, initial encounter  XR FOOT LEFT (MIN 3 VIEWS)   2. Edema of lower extremity  XR FOOT LEFT (MIN 3 VIEWS)   3. Pain in left foot  XR FOOT LEFT (MIN 3 VIEWS)   4. Onychomycosis of toenail  NV DEBRIDEMENT OF NAILS, 6 OR MORE    HM DIABETES FOOT EXAM   5. Pain of toes of both feet  NV DEBRIDEMENT OF NAILS, 6 OR MORE    HM DIABETES FOOT EXAM   6. Type 2 diabetes mellitus with peripheral vascular disease (HCC)  NV DEBRIDEMENT OF NAILS, 6 OR MORE    HM DIABETES FOOT EXAM   7. Diabetic polyneuropathy associated with type 2 diabetes mellitus (HCC)  NV DEBRIDEMENT OF NAILS, 6 OR MORE    HM DIABETES FOOT EXAM     PLAN: Toenails 1,2,3,4,5 of the right foot and 1,2,3,4,5 of the left foot were debrided in length and thickness using a nail nipper and a . Patient informed that she sustained a contusion to the left foot, but no bones are broken. Patient instructed on proper RICE of the left foot until symptoms resolve. Return in about 2 months (around 8/26/2019) for Painful fungal nails.    6/24/2019      Teresa Brown DPM

## 2019-07-24 RX ORDER — LOSARTAN POTASSIUM 100 MG/1
TABLET ORAL
Qty: 90 TABLET | Refills: 3 | Status: SHIPPED | OUTPATIENT
Start: 2019-07-24 | End: 2020-09-22

## 2019-07-29 DIAGNOSIS — R19.7 DIARRHEA, UNSPECIFIED TYPE: ICD-10-CM

## 2019-07-29 RX ORDER — DIPHENOXYLATE HYDROCHLORIDE AND ATROPINE SULFATE 2.5; .025 MG/1; MG/1
TABLET ORAL
Qty: 120 TABLET | Refills: 1 | Status: SHIPPED | OUTPATIENT
Start: 2019-07-29 | End: 2019-10-02 | Stop reason: SDUPTHER

## 2019-07-31 RX ORDER — LEVOTHYROXINE SODIUM 137 UG/1
TABLET ORAL
Qty: 90 TABLET | Refills: 3 | Status: SHIPPED | OUTPATIENT
Start: 2019-07-31 | End: 2020-08-31

## 2019-08-01 ENCOUNTER — HOSPITAL ENCOUNTER (INPATIENT)
Age: 84
LOS: 5 days | Discharge: NURSING, REHAB OR LONG-TERM CARE | DRG: 669 | End: 2019-08-07
Attending: EMERGENCY MEDICINE | Admitting: FAMILY MEDICINE
Payer: MEDICARE

## 2019-08-01 DIAGNOSIS — R31.9 HEMATURIA, UNSPECIFIED TYPE: Primary | ICD-10-CM

## 2019-08-01 LAB
ABSOLUTE EOS #: 0.2 K/UL (ref 0–0.4)
ABSOLUTE IMMATURE GRANULOCYTE: ABNORMAL K/UL (ref 0–0.3)
ABSOLUTE LYMPH #: 3 K/UL (ref 1–4.8)
ABSOLUTE MONO #: 0.6 K/UL (ref 0.1–1.3)
ALBUMIN SERPL-MCNC: 4.2 G/DL (ref 3.5–5.2)
ALBUMIN/GLOBULIN RATIO: ABNORMAL (ref 1–2.5)
ALP BLD-CCNC: 106 U/L (ref 35–104)
ALT SERPL-CCNC: 13 U/L (ref 5–33)
ANION GAP SERPL CALCULATED.3IONS-SCNC: 14 MMOL/L (ref 9–17)
AST SERPL-CCNC: 20 U/L
BASOPHILS # BLD: 1 % (ref 0–2)
BASOPHILS ABSOLUTE: 0 K/UL (ref 0–0.2)
BILIRUB SERPL-MCNC: <0.15 MG/DL (ref 0.3–1.2)
BUN BLDV-MCNC: 29 MG/DL (ref 8–23)
BUN/CREAT BLD: ABNORMAL (ref 9–20)
CALCIUM SERPL-MCNC: 9 MG/DL (ref 8.6–10.4)
CHLORIDE BLD-SCNC: 103 MMOL/L (ref 98–107)
CO2: 24 MMOL/L (ref 20–31)
CREAT SERPL-MCNC: 0.97 MG/DL (ref 0.5–0.9)
DIFFERENTIAL TYPE: ABNORMAL
EOSINOPHILS RELATIVE PERCENT: 2 % (ref 0–4)
GFR AFRICAN AMERICAN: >60 ML/MIN
GFR NON-AFRICAN AMERICAN: 55 ML/MIN
GFR SERPL CREATININE-BSD FRML MDRD: ABNORMAL ML/MIN/{1.73_M2}
GFR SERPL CREATININE-BSD FRML MDRD: ABNORMAL ML/MIN/{1.73_M2}
GLUCOSE BLD-MCNC: 210 MG/DL (ref 70–99)
HCT VFR BLD CALC: 35.5 % (ref 36–46)
HEMOGLOBIN: 12 G/DL (ref 12–16)
IMMATURE GRANULOCYTES: ABNORMAL %
INR BLD: 1
LYMPHOCYTES # BLD: 38 % (ref 24–44)
MCH RBC QN AUTO: 33.6 PG (ref 26–34)
MCHC RBC AUTO-ENTMCNC: 33.8 G/DL (ref 31–37)
MCV RBC AUTO: 99.2 FL (ref 80–100)
MONOCYTES # BLD: 8 % (ref 1–7)
NRBC AUTOMATED: ABNORMAL PER 100 WBC
PARTIAL THROMBOPLASTIN TIME: 43.6 SEC (ref 24–36)
PDW BLD-RTO: 13.5 % (ref 11.5–14.9)
PLATELET # BLD: 264 K/UL (ref 150–450)
PLATELET ESTIMATE: ABNORMAL
PMV BLD AUTO: 8.9 FL (ref 6–12)
POTASSIUM SERPL-SCNC: 4.5 MMOL/L (ref 3.7–5.3)
PROTHROMBIN TIME: 13.3 SEC (ref 11.8–14.6)
RBC # BLD: 3.58 M/UL (ref 4–5.2)
RBC # BLD: ABNORMAL 10*6/UL
SEG NEUTROPHILS: 51 % (ref 36–66)
SEGMENTED NEUTROPHILS ABSOLUTE COUNT: 4 K/UL (ref 1.3–9.1)
SODIUM BLD-SCNC: 141 MMOL/L (ref 135–144)
TOTAL PROTEIN: 7.5 G/DL (ref 6.4–8.3)
WBC # BLD: 7.9 K/UL (ref 3.5–11)
WBC # BLD: ABNORMAL 10*3/UL

## 2019-08-01 PROCEDURE — 85025 COMPLETE CBC W/AUTO DIFF WBC: CPT

## 2019-08-01 PROCEDURE — 80053 COMPREHEN METABOLIC PANEL: CPT

## 2019-08-01 PROCEDURE — 99284 EMERGENCY DEPT VISIT MOD MDM: CPT

## 2019-08-01 PROCEDURE — 36415 COLL VENOUS BLD VENIPUNCTURE: CPT

## 2019-08-01 PROCEDURE — 85730 THROMBOPLASTIN TIME PARTIAL: CPT

## 2019-08-01 PROCEDURE — 85610 PROTHROMBIN TIME: CPT

## 2019-08-01 ASSESSMENT — ENCOUNTER SYMPTOMS
COUGH: 0
SHORTNESS OF BREATH: 0
EYES NEGATIVE: 1
GASTROINTESTINAL NEGATIVE: 1
BACK PAIN: 0
ABDOMINAL PAIN: 0
RESPIRATORY NEGATIVE: 1

## 2019-08-02 PROBLEM — R31.9 HEMATURIA: Status: ACTIVE | Noted: 2019-08-02

## 2019-08-02 LAB
-: ABNORMAL
ABSOLUTE EOS #: 0.2 K/UL (ref 0–0.4)
ABSOLUTE IMMATURE GRANULOCYTE: ABNORMAL K/UL (ref 0–0.3)
ABSOLUTE LYMPH #: 2.8 K/UL (ref 1–4.8)
ABSOLUTE MONO #: 1.1 K/UL (ref 0.1–1.3)
AMORPHOUS: ABNORMAL
BACTERIA: ABNORMAL
BASOPHILS # BLD: 1 % (ref 0–2)
BASOPHILS ABSOLUTE: 0.1 K/UL (ref 0–0.2)
BILIRUBIN URINE: NEGATIVE
CASTS UA: ABNORMAL /LPF
COLOR: YELLOW
COMMENT UA: ABNORMAL
CRYSTALS, UA: ABNORMAL /HPF
DIFFERENTIAL TYPE: ABNORMAL
EOSINOPHILS RELATIVE PERCENT: 2 % (ref 0–4)
EPITHELIAL CELLS UA: ABNORMAL /HPF
GLUCOSE BLD-MCNC: 149 MG/DL (ref 65–105)
GLUCOSE BLD-MCNC: 193 MG/DL (ref 65–105)
GLUCOSE BLD-MCNC: 195 MG/DL (ref 65–105)
GLUCOSE BLD-MCNC: 227 MG/DL (ref 65–105)
GLUCOSE URINE: NEGATIVE
HCT VFR BLD CALC: 32.5 % (ref 36–46)
HEMOGLOBIN: 10.7 G/DL (ref 12–16)
IMMATURE GRANULOCYTES: ABNORMAL %
KETONES, URINE: NEGATIVE
LEUKOCYTE ESTERASE, URINE: ABNORMAL
LYMPHOCYTES # BLD: 21 % (ref 24–44)
MCH RBC QN AUTO: 33.3 PG (ref 26–34)
MCHC RBC AUTO-ENTMCNC: 33.1 G/DL (ref 31–37)
MCV RBC AUTO: 100.6 FL (ref 80–100)
MONOCYTES # BLD: 9 % (ref 1–7)
MUCUS: ABNORMAL
NITRITE, URINE: NEGATIVE
NRBC AUTOMATED: ABNORMAL PER 100 WBC
OTHER OBSERVATIONS UA: ABNORMAL
PDW BLD-RTO: 13.4 % (ref 11.5–14.9)
PH UA: 7.5 (ref 5–8)
PLATELET # BLD: 241 K/UL (ref 150–450)
PLATELET ESTIMATE: ABNORMAL
PMV BLD AUTO: 8.5 FL (ref 6–12)
PROTEIN UA: ABNORMAL
RBC # BLD: 3.23 M/UL (ref 4–5.2)
RBC # BLD: ABNORMAL 10*6/UL
RBC UA: ABNORMAL /HPF
RENAL EPITHELIAL, UA: ABNORMAL /HPF
SEG NEUTROPHILS: 67 % (ref 36–66)
SEGMENTED NEUTROPHILS ABSOLUTE COUNT: 8.9 K/UL (ref 1.3–9.1)
SPECIFIC GRAVITY UA: 1.02 (ref 1–1.03)
TRICHOMONAS: ABNORMAL
TURBIDITY: ABNORMAL
URINE HGB: ABNORMAL
UROBILINOGEN, URINE: NORMAL
WBC # BLD: 13.1 K/UL (ref 3.5–11)
WBC # BLD: ABNORMAL 10*3/UL
WBC UA: ABNORMAL /HPF
YEAST: ABNORMAL

## 2019-08-02 PROCEDURE — 87186 SC STD MICRODIL/AGAR DIL: CPT

## 2019-08-02 PROCEDURE — 6370000000 HC RX 637 (ALT 250 FOR IP): Performed by: FAMILY MEDICINE

## 2019-08-02 PROCEDURE — 6360000002 HC RX W HCPCS: Performed by: FAMILY MEDICINE

## 2019-08-02 PROCEDURE — 1200000000 HC SEMI PRIVATE

## 2019-08-02 PROCEDURE — 87086 URINE CULTURE/COLONY COUNT: CPT

## 2019-08-02 PROCEDURE — 87077 CULTURE AEROBIC IDENTIFY: CPT

## 2019-08-02 PROCEDURE — 2580000003 HC RX 258: Performed by: FAMILY MEDICINE

## 2019-08-02 PROCEDURE — 36415 COLL VENOUS BLD VENIPUNCTURE: CPT

## 2019-08-02 PROCEDURE — 51700 IRRIGATION OF BLADDER: CPT

## 2019-08-02 PROCEDURE — 82947 ASSAY GLUCOSE BLOOD QUANT: CPT

## 2019-08-02 PROCEDURE — 85025 COMPLETE CBC W/AUTO DIFF WBC: CPT

## 2019-08-02 PROCEDURE — 81001 URINALYSIS AUTO W/SCOPE: CPT

## 2019-08-02 RX ORDER — ATORVASTATIN CALCIUM 40 MG/1
40 TABLET, FILM COATED ORAL DAILY
Status: DISCONTINUED | OUTPATIENT
Start: 2019-08-02 | End: 2019-08-07 | Stop reason: HOSPADM

## 2019-08-02 RX ORDER — SODIUM CHLORIDE 9 MG/ML
INJECTION, SOLUTION INTRAVENOUS CONTINUOUS
Status: DISCONTINUED | OUTPATIENT
Start: 2019-08-02 | End: 2019-08-04

## 2019-08-02 RX ORDER — LOSARTAN POTASSIUM 100 MG/1
100 TABLET ORAL DAILY
Status: DISCONTINUED | OUTPATIENT
Start: 2019-08-02 | End: 2019-08-07 | Stop reason: HOSPADM

## 2019-08-02 RX ORDER — SODIUM CHLORIDE 0.9 % (FLUSH) 0.9 %
10 SYRINGE (ML) INJECTION EVERY 12 HOURS SCHEDULED
Status: DISCONTINUED | OUTPATIENT
Start: 2019-08-02 | End: 2019-08-07 | Stop reason: HOSPADM

## 2019-08-02 RX ORDER — FUROSEMIDE 40 MG/1
40 TABLET ORAL DAILY
Status: DISCONTINUED | OUTPATIENT
Start: 2019-08-02 | End: 2019-08-07 | Stop reason: HOSPADM

## 2019-08-02 RX ORDER — ACETAMINOPHEN 500 MG
1000 TABLET ORAL EVERY 6 HOURS PRN
Status: DISCONTINUED | OUTPATIENT
Start: 2019-08-02 | End: 2019-08-07 | Stop reason: HOSPADM

## 2019-08-02 RX ORDER — ACETAMINOPHEN 325 MG/1
650 TABLET ORAL EVERY 4 HOURS PRN
Status: DISCONTINUED | OUTPATIENT
Start: 2019-08-02 | End: 2019-08-07 | Stop reason: HOSPADM

## 2019-08-02 RX ORDER — LEVOTHYROXINE SODIUM 137 UG/1
137 TABLET ORAL DAILY
Status: DISCONTINUED | OUTPATIENT
Start: 2019-08-02 | End: 2019-08-07 | Stop reason: HOSPADM

## 2019-08-02 RX ORDER — AMLODIPINE BESYLATE 10 MG/1
10 TABLET ORAL DAILY
Status: DISCONTINUED | OUTPATIENT
Start: 2019-08-02 | End: 2019-08-07 | Stop reason: HOSPADM

## 2019-08-02 RX ORDER — DOCUSATE SODIUM 100 MG/1
100 CAPSULE, LIQUID FILLED ORAL 2 TIMES DAILY PRN
Status: DISCONTINUED | OUTPATIENT
Start: 2019-08-02 | End: 2019-08-07 | Stop reason: HOSPADM

## 2019-08-02 RX ORDER — PANTOPRAZOLE SODIUM 40 MG/1
40 TABLET, DELAYED RELEASE ORAL DAILY
Status: DISCONTINUED | OUTPATIENT
Start: 2019-08-02 | End: 2019-08-07 | Stop reason: HOSPADM

## 2019-08-02 RX ORDER — METFORMIN HYDROCHLORIDE 500 MG/1
500 TABLET, EXTENDED RELEASE ORAL
Status: DISCONTINUED | OUTPATIENT
Start: 2019-08-02 | End: 2019-08-03

## 2019-08-02 RX ORDER — HYDRALAZINE HYDROCHLORIDE 50 MG/1
50 TABLET, FILM COATED ORAL EVERY 8 HOURS SCHEDULED
Status: DISCONTINUED | OUTPATIENT
Start: 2019-08-02 | End: 2019-08-05

## 2019-08-02 RX ORDER — PHENYTOIN SODIUM 100 MG/1
100 CAPSULE, EXTENDED RELEASE ORAL 2 TIMES DAILY
Status: DISCONTINUED | OUTPATIENT
Start: 2019-08-02 | End: 2019-08-07 | Stop reason: HOSPADM

## 2019-08-02 RX ORDER — SODIUM CHLORIDE 0.9 % (FLUSH) 0.9 %
10 SYRINGE (ML) INJECTION PRN
Status: DISCONTINUED | OUTPATIENT
Start: 2019-08-02 | End: 2019-08-07 | Stop reason: HOSPADM

## 2019-08-02 RX ORDER — POTASSIUM CHLORIDE 20 MEQ/1
20 TABLET, EXTENDED RELEASE ORAL DAILY
Status: DISCONTINUED | OUTPATIENT
Start: 2019-08-02 | End: 2019-08-07 | Stop reason: HOSPADM

## 2019-08-02 RX ORDER — NITROGLYCERIN 40 MG/1
1 PATCH TRANSDERMAL DAILY
Status: DISCONTINUED | OUTPATIENT
Start: 2019-08-02 | End: 2019-08-07 | Stop reason: HOSPADM

## 2019-08-02 RX ORDER — DIPHENOXYLATE HYDROCHLORIDE AND ATROPINE SULFATE 2.5; .025 MG/1; MG/1
1 TABLET ORAL 4 TIMES DAILY PRN
Status: DISCONTINUED | OUTPATIENT
Start: 2019-08-02 | End: 2019-08-07 | Stop reason: HOSPADM

## 2019-08-02 RX ADMIN — HYDRALAZINE HYDROCHLORIDE 50 MG: 50 TABLET, FILM COATED ORAL at 17:00

## 2019-08-02 RX ADMIN — METOPROLOL TARTRATE 75 MG: 25 TABLET ORAL at 10:15

## 2019-08-02 RX ADMIN — METOPROLOL TARTRATE 75 MG: 25 TABLET ORAL at 20:32

## 2019-08-02 RX ADMIN — LEVOTHYROXINE SODIUM 137 MCG: 137 TABLET ORAL at 12:45

## 2019-08-02 RX ADMIN — HYDRALAZINE HYDROCHLORIDE 50 MG: 50 TABLET, FILM COATED ORAL at 10:19

## 2019-08-02 RX ADMIN — METFORMIN HYDROCHLORIDE 500 MG: 500 TABLET, EXTENDED RELEASE ORAL at 17:00

## 2019-08-02 RX ADMIN — PHENYTOIN SODIUM 100 MG: 100 CAPSULE ORAL at 10:16

## 2019-08-02 RX ADMIN — ATORVASTATIN CALCIUM 40 MG: 40 TABLET, FILM COATED ORAL at 12:45

## 2019-08-02 RX ADMIN — FUROSEMIDE 40 MG: 40 TABLET ORAL at 10:16

## 2019-08-02 RX ADMIN — AMLODIPINE BESYLATE 10 MG: 10 TABLET ORAL at 10:15

## 2019-08-02 RX ADMIN — POTASSIUM CHLORIDE 20 MEQ: 20 TABLET, EXTENDED RELEASE ORAL at 10:16

## 2019-08-02 RX ADMIN — CEFTRIAXONE SODIUM 1 G: 1 INJECTION, POWDER, FOR SOLUTION INTRAMUSCULAR; INTRAVENOUS at 10:16

## 2019-08-02 RX ADMIN — PHENYTOIN SODIUM 100 MG: 100 CAPSULE ORAL at 20:32

## 2019-08-02 RX ADMIN — SODIUM CHLORIDE: 9 INJECTION, SOLUTION INTRAVENOUS at 06:39

## 2019-08-02 RX ADMIN — PANTOPRAZOLE SODIUM 40 MG: 40 TABLET, DELAYED RELEASE ORAL at 10:16

## 2019-08-02 RX ADMIN — LOSARTAN POTASSIUM 100 MG: 100 TABLET ORAL at 10:15

## 2019-08-02 NOTE — ED NOTES
Bed: 11  Expected date:   Expected time:   Means of arrival:   Comments:     Dandy Nuñez RN  08/01/19 [id number]

## 2019-08-02 NOTE — ED PROVIDER NOTES
E11.9    CLOPIDOGREL (PLAVIX) 75 MG TABLET    TAKE 1 TABLET DAILY    DIPHENOXYLATE-ATROPINE (LOMOTIL) 2.5-0.025 MG PER TABLET    TAKE 1 TABLET BY MOUTH 4 TIMES DAILY AS NEEDED FOR  DIARRHEA    DOCUSATE SODIUM (COLACE) 100 MG CAPSULE    Take 1 capsule by mouth 2 times daily as needed for Constipation    FREESTYLE LANCETS MISC    Three times daily. E11.9    FUROSEMIDE (LASIX) 40 MG TABLET    TAKE 1 TABLET DAILY    HYDRALAZINE (APRESOLINE) 50 MG TABLET    TAKE 1 TABLET EVERY 8 HOURS    INSULIN NPH (HUMULIN N;NOVOLIN N) 100 UNIT/ML INJECTION VIAL    Inject 27 Units into the skin every morning Can go up to 30 units if neede    INSULIN PEN NEEDLE 31G X 8 MM MISC    Inject 1 each into the skin daily    LEVOTHYROXINE (SYNTHROID) 137 MCG TABLET    TAKE 1 TABLET DAILY    LOSARTAN (COZAAR) 100 MG TABLET    TAKE 1 TABLET DAILY    METFORMIN (GLUCOPHAGE-XR) 500 MG EXTENDED RELEASE TABLET    TAKE 1 TABLET DAILY WITH SUPPER    METOPROLOL TARTRATE (LOPRESSOR) 25 MG TABLET        METOPROLOL TARTRATE 75 MG TABS    TAKE 1 TABLET TWICE A DAY    MOMETASONE (ELOCON) 0.1 % CREAM    Apply topically 2 times daily as needed Apply topically daily. NITROGLYCERIN (NITRODUR) 0.2 MG/HR    APPLY 1 PATCH ONTO THE SKIN DAILY, 12 HOURS ON THEN 12 HOURS OFF    NITROGLYCERIN 400 MCG/SPRAY AERS    PLACE 1 SPRAY UNDER THE TONGUE EVERY 5 MINUTES AS NEEDED (CHEST PAIN) FOR UP TO 3 DOSES    PANTOPRAZOLE (PROTONIX) 40 MG TABLET    TAKE 1 TABLET DAILY    PHENYTOIN (DILANTIN) 100 MG ER CAPSULE    TAKE 1 CAPSULE THREE TIMES A DAY    POTASSIUM CHLORIDE (KLOR-CON M) 20 MEQ EXTENDED RELEASE TABLET    TAKE 1 TABLET DAILY     ALLERGIES     has No Known Allergies. FAMILY HISTORY     She indicated that her mother is . She indicated that her father is . She indicated that her sister is . She indicated that her brother is . She indicated that the status of her maternal grandmother is unknown.  She indicated that the status of her REFERRED TO:  Jeanine Duvall MD  [address]   [address]  [facility] [address] [phone]          DISCHARGE MEDICATIONS:  New Prescriptions    No medications on file     Jus Zelaya MD  Attending Emergency Physician                    Florecita Larsen MD  08/02/19 0778

## 2019-08-03 ENCOUNTER — APPOINTMENT (OUTPATIENT)
Dept: CT IMAGING | Age: 84
DRG: 669 | End: 2019-08-03
Payer: MEDICARE

## 2019-08-03 LAB
CULTURE: ABNORMAL
CULTURE: ABNORMAL
GLUCOSE BLD-MCNC: 102 MG/DL (ref 65–105)
GLUCOSE BLD-MCNC: 117 MG/DL (ref 65–105)
GLUCOSE BLD-MCNC: 125 MG/DL (ref 65–105)
GLUCOSE BLD-MCNC: 151 MG/DL (ref 65–105)
GLUCOSE BLD-MCNC: 155 MG/DL (ref 65–105)
GLUCOSE BLD-MCNC: 175 MG/DL (ref 65–105)
Lab: ABNORMAL
SPECIMEN DESCRIPTION: ABNORMAL

## 2019-08-03 PROCEDURE — 99232 SBSQ HOSP IP/OBS MODERATE 35: CPT | Performed by: FAMILY MEDICINE

## 2019-08-03 PROCEDURE — 1200000000 HC SEMI PRIVATE

## 2019-08-03 PROCEDURE — 82947 ASSAY GLUCOSE BLOOD QUANT: CPT

## 2019-08-03 PROCEDURE — 2580000003 HC RX 258: Performed by: UROLOGY

## 2019-08-03 PROCEDURE — 6370000000 HC RX 637 (ALT 250 FOR IP): Performed by: UROLOGY

## 2019-08-03 PROCEDURE — 74178 CT ABD&PLV WO CNTR FLWD CNTR: CPT

## 2019-08-03 PROCEDURE — 2580000003 HC RX 258: Performed by: FAMILY MEDICINE

## 2019-08-03 PROCEDURE — 6370000000 HC RX 637 (ALT 250 FOR IP): Performed by: FAMILY MEDICINE

## 2019-08-03 PROCEDURE — 6360000002 HC RX W HCPCS: Performed by: FAMILY MEDICINE

## 2019-08-03 PROCEDURE — 6360000004 HC RX CONTRAST MEDICATION: Performed by: UROLOGY

## 2019-08-03 RX ORDER — 0.9 % SODIUM CHLORIDE 0.9 %
80 INTRAVENOUS SOLUTION INTRAVENOUS ONCE
Status: COMPLETED | OUTPATIENT
Start: 2019-08-03 | End: 2019-08-03

## 2019-08-03 RX ORDER — SODIUM CHLORIDE 0.9 % (FLUSH) 0.9 %
10 SYRINGE (ML) INJECTION PRN
Status: DISCONTINUED | OUTPATIENT
Start: 2019-08-03 | End: 2019-08-07 | Stop reason: HOSPADM

## 2019-08-03 RX ORDER — TROSPIUM CHLORIDE 20 MG/1
20 TABLET, FILM COATED ORAL
Status: DISCONTINUED | OUTPATIENT
Start: 2019-08-03 | End: 2019-08-07 | Stop reason: HOSPADM

## 2019-08-03 RX ADMIN — IOVERSOL 120 ML: 741 INJECTION INTRA-ARTERIAL; INTRAVENOUS at 11:05

## 2019-08-03 RX ADMIN — POTASSIUM CHLORIDE 20 MEQ: 20 TABLET, EXTENDED RELEASE ORAL at 08:13

## 2019-08-03 RX ADMIN — METOPROLOL TARTRATE 75 MG: 25 TABLET ORAL at 21:28

## 2019-08-03 RX ADMIN — HYDRALAZINE HYDROCHLORIDE 50 MG: 50 TABLET, FILM COATED ORAL at 08:13

## 2019-08-03 RX ADMIN — CEFTRIAXONE SODIUM 1 G: 1 INJECTION, POWDER, FOR SOLUTION INTRAMUSCULAR; INTRAVENOUS at 08:12

## 2019-08-03 RX ADMIN — PHENYTOIN SODIUM 100 MG: 100 CAPSULE ORAL at 08:13

## 2019-08-03 RX ADMIN — HYDRALAZINE HYDROCHLORIDE 50 MG: 50 TABLET, FILM COATED ORAL at 00:35

## 2019-08-03 RX ADMIN — ACETAMINOPHEN 1000 MG: 500 TABLET, FILM COATED ORAL at 00:35

## 2019-08-03 RX ADMIN — PANTOPRAZOLE SODIUM 40 MG: 40 TABLET, DELAYED RELEASE ORAL at 08:13

## 2019-08-03 RX ADMIN — Medication 10 ML: at 11:05

## 2019-08-03 RX ADMIN — METOPROLOL TARTRATE 75 MG: 25 TABLET ORAL at 08:13

## 2019-08-03 RX ADMIN — ACETAMINOPHEN 650 MG: 325 TABLET, FILM COATED ORAL at 05:13

## 2019-08-03 RX ADMIN — INSULIN HUMAN 27 UNITS: 100 INJECTION, SUSPENSION SUBCUTANEOUS at 09:27

## 2019-08-03 RX ADMIN — SODIUM CHLORIDE 80 ML: 9 INJECTION, SOLUTION INTRAVENOUS at 11:05

## 2019-08-03 RX ADMIN — ATORVASTATIN CALCIUM 40 MG: 40 TABLET, FILM COATED ORAL at 08:13

## 2019-08-03 RX ADMIN — PHENYTOIN SODIUM 100 MG: 100 CAPSULE ORAL at 21:28

## 2019-08-03 RX ADMIN — TROSPIUM CHLORIDE 20 MG: 20 TABLET, FILM COATED ORAL at 22:31

## 2019-08-03 RX ADMIN — ACETAMINOPHEN 1000 MG: 500 TABLET, FILM COATED ORAL at 11:50

## 2019-08-03 RX ADMIN — LOSARTAN POTASSIUM 100 MG: 100 TABLET ORAL at 08:13

## 2019-08-03 RX ADMIN — HYDRALAZINE HYDROCHLORIDE 50 MG: 50 TABLET, FILM COATED ORAL at 17:40

## 2019-08-03 RX ADMIN — SODIUM CHLORIDE: 9 INJECTION, SOLUTION INTRAVENOUS at 00:47

## 2019-08-03 RX ADMIN — AMLODIPINE BESYLATE 10 MG: 10 TABLET ORAL at 08:13

## 2019-08-03 RX ADMIN — LEVOTHYROXINE SODIUM 137 MCG: 137 TABLET ORAL at 05:14

## 2019-08-03 RX ADMIN — FUROSEMIDE 40 MG: 40 TABLET ORAL at 08:13

## 2019-08-03 RX ADMIN — METFORMIN HYDROCHLORIDE 500 MG: 500 TABLET, EXTENDED RELEASE ORAL at 08:13

## 2019-08-03 ASSESSMENT — PAIN SCALES - GENERAL
PAINLEVEL_OUTOF10: 0
PAINLEVEL_OUTOF10: 9
PAINLEVEL_OUTOF10: 0
PAINLEVEL_OUTOF10: 7

## 2019-08-03 ASSESSMENT — PAIN DESCRIPTION - PAIN TYPE
TYPE: ACUTE PAIN

## 2019-08-03 ASSESSMENT — PAIN DESCRIPTION - ONSET: ONSET: SUDDEN

## 2019-08-03 ASSESSMENT — PAIN DESCRIPTION - DESCRIPTORS
DESCRIPTORS: PRESSURE;SPASM
DESCRIPTORS: ACHING

## 2019-08-03 ASSESSMENT — PAIN DESCRIPTION - FREQUENCY: FREQUENCY: INTERMITTENT

## 2019-08-03 ASSESSMENT — PAIN DESCRIPTION - PROGRESSION: CLINICAL_PROGRESSION: GRADUALLY IMPROVING

## 2019-08-03 ASSESSMENT — PAIN DESCRIPTION - LOCATION
LOCATION: ABDOMEN;PELVIS
LOCATION: ABDOMEN
LOCATION: ABDOMEN

## 2019-08-03 NOTE — PLAN OF CARE
Problem: Falls - Risk of:  Goal: Will remain free from falls  Description  Will remain free from falls  8/3/2019 0432 by Adela Vaughn RN  Outcome: Ongoing   Pt. Free of falls and injuries this shift. Bed low and locked. Siderails up x2. Personal items and call light in reach.

## 2019-08-03 NOTE — PROGRESS NOTES
Progress Note    8/3/2019 9:32 AM  Subjective: Interval History: Patient without complaints. No nausea or vomiting. Hematuria persist.  Urinalysis growing gram-negative rods with sensitivity pending    Diet: DIET CARB CONTROL; Carb Control: 4 carb choices (60 gms)/meal    Medications:   Reviewed medications    Labs:   CBC:   Recent Labs     08/02/19 0802   WBC 13.1*   HGB 10.7*        BMP:    Recent Labs     08/01/19 2312      K 4.5      CO2 24   BUN 29*   CREATININE 0.97*   GLUCOSE 210*     Hepatic:   Recent Labs     08/01/19 2312   AST 20   ALT 13   BILITOT <0.15*   ALKPHOS 106*     Troponin: No results for input(s): TROPONINI in the last 72 hours. BNP: No results for input(s): BNP in the last 72 hours. Lipids: No results for input(s): CHOL, HDL in the last 72 hours.     Invalid input(s): LDLCALCU  INR:   Recent Labs     08/01/19 2312   INR 1.0       CBC:   Lab Results   Component Value Date    WBC 13.1 08/02/2019    RBC 3.23 08/02/2019    HGB 10.7 08/02/2019    HCT 32.5 08/02/2019    .6 08/02/2019    MCH 33.3 08/02/2019    MCHC 33.1 08/02/2019    RDW 13.4 08/02/2019     08/02/2019    MPV 8.5 08/02/2019     BMP:    Lab Results   Component Value Date     08/01/2019    K 4.5 08/01/2019     08/01/2019    CO2 24 08/01/2019    BUN 29 08/01/2019    LABALBU 4.2 08/01/2019    CREATININE 0.97 08/01/2019    CALCIUM 9.0 08/01/2019    GFRAA >60 08/01/2019    LABGLOM 55 08/01/2019    GLUCOSE 210 08/01/2019       Objective:   Vitals: BP (!) 156/82   Pulse 63   Temp 98.4 °F (36.9 °C)   Resp 16   Ht 5' 4\" (1.626 m)   Wt 154 lb (69.9 kg)   SpO2 95%   BMI 26.43 kg/m²   General appearance: alert and cooperative with exam  Neck: no adenopathy, no carotid bruit, no JVD, supple, symmetrical, trachea midline and thyroid not enlarged, symmetric, no tenderness/mass/nodules  Lungs: clear to auscultation bilaterally  Heart: regular rate and rhythm, S1, S2 normal, no murmur, click,

## 2019-08-04 PROBLEM — N20.0 NEPHROLITHIASIS: Status: ACTIVE | Noted: 2019-08-04

## 2019-08-04 LAB
ABSOLUTE EOS #: 0.3 K/UL (ref 0–0.4)
ABSOLUTE IMMATURE GRANULOCYTE: ABNORMAL K/UL (ref 0–0.3)
ABSOLUTE LYMPH #: 3 K/UL (ref 1–4.8)
ABSOLUTE MONO #: 0.9 K/UL (ref 0.1–1.3)
ANION GAP SERPL CALCULATED.3IONS-SCNC: 12 MMOL/L (ref 9–17)
BASOPHILS # BLD: 1 % (ref 0–2)
BASOPHILS ABSOLUTE: 0.1 K/UL (ref 0–0.2)
BUN BLDV-MCNC: 17 MG/DL (ref 8–23)
BUN/CREAT BLD: ABNORMAL (ref 9–20)
CALCIUM SERPL-MCNC: 8.4 MG/DL (ref 8.6–10.4)
CHLORIDE BLD-SCNC: 104 MMOL/L (ref 98–107)
CO2: 22 MMOL/L (ref 20–31)
CREAT SERPL-MCNC: 0.84 MG/DL (ref 0.5–0.9)
DIFFERENTIAL TYPE: ABNORMAL
EOSINOPHILS RELATIVE PERCENT: 3 % (ref 0–4)
GFR AFRICAN AMERICAN: >60 ML/MIN
GFR NON-AFRICAN AMERICAN: >60 ML/MIN
GFR SERPL CREATININE-BSD FRML MDRD: ABNORMAL ML/MIN/{1.73_M2}
GFR SERPL CREATININE-BSD FRML MDRD: ABNORMAL ML/MIN/{1.73_M2}
GLUCOSE BLD-MCNC: 138 MG/DL (ref 65–105)
GLUCOSE BLD-MCNC: 162 MG/DL (ref 65–105)
GLUCOSE BLD-MCNC: 179 MG/DL (ref 65–105)
GLUCOSE BLD-MCNC: 265 MG/DL (ref 65–105)
GLUCOSE BLD-MCNC: 277 MG/DL (ref 70–99)
GLUCOSE BLD-MCNC: 67 MG/DL (ref 65–105)
HCT VFR BLD CALC: 28.6 % (ref 36–46)
HEMOGLOBIN: 9.3 G/DL (ref 12–16)
IMMATURE GRANULOCYTES: ABNORMAL %
LYMPHOCYTES # BLD: 29 % (ref 24–44)
MCH RBC QN AUTO: 32.5 PG (ref 26–34)
MCHC RBC AUTO-ENTMCNC: 32.5 G/DL (ref 31–37)
MCV RBC AUTO: 100 FL (ref 80–100)
MONOCYTES # BLD: 9 % (ref 1–7)
NRBC AUTOMATED: ABNORMAL PER 100 WBC
PDW BLD-RTO: 13 % (ref 11.5–14.9)
PLATELET # BLD: 225 K/UL (ref 150–450)
PLATELET ESTIMATE: ABNORMAL
PMV BLD AUTO: 8.8 FL (ref 6–12)
POTASSIUM SERPL-SCNC: 3.7 MMOL/L (ref 3.7–5.3)
RBC # BLD: 2.86 M/UL (ref 4–5.2)
RBC # BLD: ABNORMAL 10*6/UL
SEG NEUTROPHILS: 58 % (ref 36–66)
SEGMENTED NEUTROPHILS ABSOLUTE COUNT: 6.2 K/UL (ref 1.3–9.1)
SODIUM BLD-SCNC: 138 MMOL/L (ref 135–144)
WBC # BLD: 10.5 K/UL (ref 3.5–11)
WBC # BLD: ABNORMAL 10*3/UL

## 2019-08-04 PROCEDURE — 99232 SBSQ HOSP IP/OBS MODERATE 35: CPT | Performed by: FAMILY MEDICINE

## 2019-08-04 PROCEDURE — 2580000003 HC RX 258: Performed by: FAMILY MEDICINE

## 2019-08-04 PROCEDURE — 6370000000 HC RX 637 (ALT 250 FOR IP): Performed by: FAMILY MEDICINE

## 2019-08-04 PROCEDURE — 6370000000 HC RX 637 (ALT 250 FOR IP): Performed by: UROLOGY

## 2019-08-04 PROCEDURE — 1200000000 HC SEMI PRIVATE

## 2019-08-04 PROCEDURE — 36415 COLL VENOUS BLD VENIPUNCTURE: CPT

## 2019-08-04 PROCEDURE — 80048 BASIC METABOLIC PNL TOTAL CA: CPT

## 2019-08-04 PROCEDURE — 6360000002 HC RX W HCPCS: Performed by: FAMILY MEDICINE

## 2019-08-04 PROCEDURE — 85025 COMPLETE CBC W/AUTO DIFF WBC: CPT

## 2019-08-04 PROCEDURE — 82947 ASSAY GLUCOSE BLOOD QUANT: CPT

## 2019-08-04 RX ORDER — ZOLPIDEM TARTRATE 5 MG/1
5 TABLET ORAL NIGHTLY PRN
Status: DISCONTINUED | OUTPATIENT
Start: 2019-08-04 | End: 2019-08-04

## 2019-08-04 RX ORDER — FUROSEMIDE 10 MG/ML
20 INJECTION INTRAMUSCULAR; INTRAVENOUS ONCE
Status: COMPLETED | OUTPATIENT
Start: 2019-08-04 | End: 2019-08-04

## 2019-08-04 RX ORDER — DIPHENHYDRAMINE HCL 25 MG
25 TABLET ORAL EVERY 6 HOURS PRN
Status: DISCONTINUED | OUTPATIENT
Start: 2019-08-04 | End: 2019-08-07 | Stop reason: HOSPADM

## 2019-08-04 RX ORDER — ZOLPIDEM TARTRATE 5 MG/1
5 TABLET ORAL NIGHTLY PRN
Status: DISCONTINUED | OUTPATIENT
Start: 2019-08-04 | End: 2019-08-07 | Stop reason: HOSPADM

## 2019-08-04 RX ORDER — CIPROFLOXACIN 2 MG/ML
400 INJECTION, SOLUTION INTRAVENOUS EVERY 12 HOURS
Status: DISCONTINUED | OUTPATIENT
Start: 2019-08-04 | End: 2019-08-06

## 2019-08-04 RX ORDER — OXYBUTYNIN CHLORIDE 10 MG/1
10 TABLET, EXTENDED RELEASE ORAL 2 TIMES DAILY
Status: DISCONTINUED | OUTPATIENT
Start: 2019-08-04 | End: 2019-08-07 | Stop reason: HOSPADM

## 2019-08-04 RX ADMIN — POTASSIUM CHLORIDE 20 MEQ: 20 TABLET, EXTENDED RELEASE ORAL at 08:53

## 2019-08-04 RX ADMIN — ZOLPIDEM TARTRATE 5 MG: 5 TABLET ORAL at 01:10

## 2019-08-04 RX ADMIN — Medication 10 ML: at 08:56

## 2019-08-04 RX ADMIN — PHENYTOIN SODIUM 100 MG: 100 CAPSULE ORAL at 08:53

## 2019-08-04 RX ADMIN — AMLODIPINE BESYLATE 10 MG: 10 TABLET ORAL at 08:53

## 2019-08-04 RX ADMIN — OXYBUTYNIN CHLORIDE 10 MG: 10 TABLET, EXTENDED RELEASE ORAL at 20:23

## 2019-08-04 RX ADMIN — LOSARTAN POTASSIUM 100 MG: 100 TABLET ORAL at 08:53

## 2019-08-04 RX ADMIN — CIPROFLOXACIN 400 MG: 2 INJECTION, SOLUTION INTRAVENOUS at 09:35

## 2019-08-04 RX ADMIN — TROSPIUM CHLORIDE 20 MG: 20 TABLET, FILM COATED ORAL at 06:52

## 2019-08-04 RX ADMIN — FUROSEMIDE 40 MG: 40 TABLET ORAL at 08:53

## 2019-08-04 RX ADMIN — ATORVASTATIN CALCIUM 40 MG: 40 TABLET, FILM COATED ORAL at 08:53

## 2019-08-04 RX ADMIN — ZOLPIDEM TARTRATE 5 MG: 5 TABLET ORAL at 22:56

## 2019-08-04 RX ADMIN — OXYBUTYNIN CHLORIDE 10 MG: 10 TABLET, EXTENDED RELEASE ORAL at 12:41

## 2019-08-04 RX ADMIN — TROSPIUM CHLORIDE 20 MG: 20 TABLET, FILM COATED ORAL at 16:31

## 2019-08-04 RX ADMIN — CEFTRIAXONE SODIUM 1 G: 1 INJECTION, POWDER, FOR SOLUTION INTRAMUSCULAR; INTRAVENOUS at 08:54

## 2019-08-04 RX ADMIN — METOPROLOL TARTRATE 75 MG: 25 TABLET ORAL at 20:23

## 2019-08-04 RX ADMIN — HYDRALAZINE HYDROCHLORIDE 50 MG: 50 TABLET, FILM COATED ORAL at 16:31

## 2019-08-04 RX ADMIN — METOPROLOL TARTRATE 75 MG: 25 TABLET ORAL at 08:53

## 2019-08-04 RX ADMIN — DIPHENHYDRAMINE HCL 25 MG: 25 TABLET ORAL at 21:20

## 2019-08-04 RX ADMIN — ACETAMINOPHEN 650 MG: 325 TABLET, FILM COATED ORAL at 19:55

## 2019-08-04 RX ADMIN — CIPROFLOXACIN 400 MG: 2 INJECTION, SOLUTION INTRAVENOUS at 20:24

## 2019-08-04 RX ADMIN — INSULIN HUMAN 27 UNITS: 100 INJECTION, SUSPENSION SUBCUTANEOUS at 09:41

## 2019-08-04 RX ADMIN — PHENYTOIN SODIUM 100 MG: 100 CAPSULE ORAL at 20:24

## 2019-08-04 RX ADMIN — Medication 10 ML: at 20:25

## 2019-08-04 RX ADMIN — PANTOPRAZOLE SODIUM 40 MG: 40 TABLET, DELAYED RELEASE ORAL at 08:53

## 2019-08-04 RX ADMIN — HYDRALAZINE HYDROCHLORIDE 50 MG: 50 TABLET, FILM COATED ORAL at 08:53

## 2019-08-04 RX ADMIN — LEVOTHYROXINE SODIUM 137 MCG: 137 TABLET ORAL at 06:52

## 2019-08-04 RX ADMIN — HYDRALAZINE HYDROCHLORIDE 50 MG: 50 TABLET, FILM COATED ORAL at 01:28

## 2019-08-04 RX ADMIN — DIPHENHYDRAMINE HCL 25 MG: 25 TABLET ORAL at 11:59

## 2019-08-04 RX ADMIN — FUROSEMIDE 20 MG: 10 INJECTION, SOLUTION INTRAMUSCULAR; INTRAVENOUS at 08:54

## 2019-08-04 ASSESSMENT — PAIN DESCRIPTION - PAIN TYPE: TYPE: ACUTE PAIN

## 2019-08-04 ASSESSMENT — PAIN SCALES - GENERAL
PAINLEVEL_OUTOF10: 3
PAINLEVEL_OUTOF10: 9

## 2019-08-04 ASSESSMENT — PAIN DESCRIPTION - DESCRIPTORS: DESCRIPTORS: ACHING;CONSTANT

## 2019-08-04 ASSESSMENT — PAIN DESCRIPTION - LOCATION: LOCATION: HEAD

## 2019-08-04 ASSESSMENT — PAIN DESCRIPTION - FREQUENCY: FREQUENCY: CONTINUOUS

## 2019-08-04 NOTE — PROGRESS NOTES
Exam:     NAD  AOx3  Peripheral pulses palpable  Regular rhythm  Respirations nonlabored, symmetric chest rise bilaterally  Soft, NT, ND  Incision- clean/dry/intact; no drainage or erythema  Bourgeois in place-CBI, clear, running fast  EPC cuffs on and functioning billaterally  Interval Imaging Findings:    Impression:    Patient Active Problem List   Diagnosis    CVA (cerebral vascular accident) ([address]    TIA (transient ischemic attack)    Essential hypertension    Hyperlipidemia with target LDL less than 70    Arthritis of knee, degenerative    Arthritis    Carcinoma of uterus ([address]    Change in voice    Chronic cough    Seizure ([address]    Cystocele    Essential hypertriglyceridemia    Gait disturbance    Heart disease    Heartburn    Hoarseness    Hypothyroidism    Mitral regurgitation    Postmenopausal atrophic vaginitis    Reflux esophagitis    Stroke syndrome    Tinea corporis    Tricuspid regurgitation    Type 2 diabetes mellitus without complication, with long-term current use of insulin (HCC)    Dyspnea    History of skin cancer    Knee injury, right, sequela    Traumatic hematoma of right knee    Diarrhea    Bilateral carotid artery stenosis    Bilateral pneumonia    Carcinoma of endometrium ([address]    Chest pain    Coronary atherosclerosis    Functional urinary incontinence    Weakness    Hematuria    Nephrolithiasis       Plan:     CT results noted  Ditropan for bladder spasms  NPO a midnight for cystoscopy, possible clot evacuation possible TURBT  Continue CBI and PRN hand irrigation      FRED Fernandez [facility]  [phone]      12:28 PM 8/4/2019

## 2019-08-05 ENCOUNTER — TELEPHONE (OUTPATIENT)
Dept: UROLOGY | Age: 84
End: 2019-08-05

## 2019-08-05 ENCOUNTER — ANESTHESIA (OUTPATIENT)
Dept: OPERATING ROOM | Age: 84
DRG: 669 | End: 2019-08-05
Payer: MEDICARE

## 2019-08-05 ENCOUNTER — ANESTHESIA EVENT (OUTPATIENT)
Dept: OPERATING ROOM | Age: 84
DRG: 669 | End: 2019-08-05
Payer: MEDICARE

## 2019-08-05 VITALS
RESPIRATION RATE: 1 BRPM | TEMPERATURE: 98.4 F | DIASTOLIC BLOOD PRESSURE: 44 MMHG | OXYGEN SATURATION: 100 % | SYSTOLIC BLOOD PRESSURE: 116 MMHG

## 2019-08-05 PROBLEM — Z93.59 SUPRAPUBIC CATHETER (HCC): Chronic | Status: ACTIVE | Noted: 2019-08-05

## 2019-08-05 PROBLEM — N30.01 ACUTE CYSTITIS WITH HEMATURIA: Status: ACTIVE | Noted: 2019-08-05

## 2019-08-05 LAB
ABSOLUTE EOS #: 0.3 K/UL (ref 0–0.4)
ABSOLUTE IMMATURE GRANULOCYTE: ABNORMAL K/UL (ref 0–0.3)
ABSOLUTE LYMPH #: 3 K/UL (ref 1–4.8)
ABSOLUTE MONO #: 1.3 K/UL (ref 0.1–1.3)
ANION GAP SERPL CALCULATED.3IONS-SCNC: 11 MMOL/L (ref 9–17)
BASOPHILS # BLD: 0 % (ref 0–2)
BASOPHILS ABSOLUTE: 0.1 K/UL (ref 0–0.2)
BUN BLDV-MCNC: 18 MG/DL (ref 8–23)
BUN/CREAT BLD: ABNORMAL (ref 9–20)
CALCIUM SERPL-MCNC: 9 MG/DL (ref 8.6–10.4)
CHLORIDE BLD-SCNC: 104 MMOL/L (ref 98–107)
CO2: 27 MMOL/L (ref 20–31)
CREAT SERPL-MCNC: 0.8 MG/DL (ref 0.5–0.9)
DIFFERENTIAL TYPE: ABNORMAL
EOSINOPHILS RELATIVE PERCENT: 3 % (ref 0–4)
GFR AFRICAN AMERICAN: >60 ML/MIN
GFR NON-AFRICAN AMERICAN: >60 ML/MIN
GFR SERPL CREATININE-BSD FRML MDRD: ABNORMAL ML/MIN/{1.73_M2}
GFR SERPL CREATININE-BSD FRML MDRD: ABNORMAL ML/MIN/{1.73_M2}
GLUCOSE BLD-MCNC: 121 MG/DL (ref 65–105)
GLUCOSE BLD-MCNC: 127 MG/DL (ref 70–99)
GLUCOSE BLD-MCNC: 178 MG/DL (ref 65–105)
GLUCOSE BLD-MCNC: 179 MG/DL (ref 65–105)
GLUCOSE BLD-MCNC: 201 MG/DL (ref 65–105)
GLUCOSE BLD-MCNC: 203 MG/DL (ref 65–105)
GLUCOSE BLD-MCNC: 269 MG/DL (ref 65–105)
HCT VFR BLD CALC: 32.1 % (ref 36–46)
HEMOGLOBIN: 10.5 G/DL (ref 12–16)
IMMATURE GRANULOCYTES: ABNORMAL %
LYMPHOCYTES # BLD: 24 % (ref 24–44)
MCH RBC QN AUTO: 32.7 PG (ref 26–34)
MCHC RBC AUTO-ENTMCNC: 32.7 G/DL (ref 31–37)
MCV RBC AUTO: 100.2 FL (ref 80–100)
MONOCYTES # BLD: 10 % (ref 1–7)
NRBC AUTOMATED: ABNORMAL PER 100 WBC
PDW BLD-RTO: 13.4 % (ref 11.5–14.9)
PLATELET # BLD: 245 K/UL (ref 150–450)
PLATELET ESTIMATE: ABNORMAL
PMV BLD AUTO: 8.9 FL (ref 6–12)
POTASSIUM SERPL-SCNC: 4.1 MMOL/L (ref 3.7–5.3)
RBC # BLD: 3.2 M/UL (ref 4–5.2)
RBC # BLD: ABNORMAL 10*6/UL
SEG NEUTROPHILS: 63 % (ref 36–66)
SEGMENTED NEUTROPHILS ABSOLUTE COUNT: 8.2 K/UL (ref 1.3–9.1)
SODIUM BLD-SCNC: 142 MMOL/L (ref 135–144)
WBC # BLD: 12.9 K/UL (ref 3.5–11)
WBC # BLD: ABNORMAL 10*3/UL

## 2019-08-05 PROCEDURE — 7100000001 HC PACU RECOVERY - ADDTL 15 MIN: Performed by: UROLOGY

## 2019-08-05 PROCEDURE — 3700000000 HC ANESTHESIA ATTENDED CARE: Performed by: UROLOGY

## 2019-08-05 PROCEDURE — 80048 BASIC METABOLIC PNL TOTAL CA: CPT

## 2019-08-05 PROCEDURE — 6360000002 HC RX W HCPCS: Performed by: NURSE ANESTHETIST, CERTIFIED REGISTERED

## 2019-08-05 PROCEDURE — 3700000001 HC ADD 15 MINUTES (ANESTHESIA): Performed by: UROLOGY

## 2019-08-05 PROCEDURE — 0TBB8ZX EXCISION OF BLADDER, VIA NATURAL OR ARTIFICIAL OPENING ENDOSCOPIC, DIAGNOSTIC: ICD-10-PCS | Performed by: UROLOGY

## 2019-08-05 PROCEDURE — 6370000000 HC RX 637 (ALT 250 FOR IP): Performed by: UROLOGY

## 2019-08-05 PROCEDURE — 6360000002 HC RX W HCPCS: Performed by: FAMILY MEDICINE

## 2019-08-05 PROCEDURE — 2580000003 HC RX 258: Performed by: FAMILY MEDICINE

## 2019-08-05 PROCEDURE — 7100000000 HC PACU RECOVERY - FIRST 15 MIN: Performed by: UROLOGY

## 2019-08-05 PROCEDURE — 88305 TISSUE EXAM BY PATHOLOGIST: CPT

## 2019-08-05 PROCEDURE — 2580000003 HC RX 258: Performed by: ANESTHESIOLOGY

## 2019-08-05 PROCEDURE — 2500000003 HC RX 250 WO HCPCS: Performed by: NURSE ANESTHETIST, CERTIFIED REGISTERED

## 2019-08-05 PROCEDURE — 6370000000 HC RX 637 (ALT 250 FOR IP): Performed by: FAMILY MEDICINE

## 2019-08-05 PROCEDURE — 3600000002 HC SURGERY LEVEL 2 BASE: Performed by: UROLOGY

## 2019-08-05 PROCEDURE — 2709999900 HC NON-CHARGEABLE SUPPLY: Performed by: UROLOGY

## 2019-08-05 PROCEDURE — 6360000002 HC RX W HCPCS: Performed by: UROLOGY

## 2019-08-05 PROCEDURE — 1200000000 HC SEMI PRIVATE

## 2019-08-05 PROCEDURE — 85025 COMPLETE CBC W/AUTO DIFF WBC: CPT

## 2019-08-05 PROCEDURE — 3600000012 HC SURGERY LEVEL 2 ADDTL 15MIN: Performed by: UROLOGY

## 2019-08-05 PROCEDURE — 0TCD8ZZ EXTIRPATION OF MATTER FROM URETHRA, VIA NATURAL OR ARTIFICIAL OPENING ENDOSCOPIC: ICD-10-PCS | Performed by: UROLOGY

## 2019-08-05 PROCEDURE — 99232 SBSQ HOSP IP/OBS MODERATE 35: CPT | Performed by: FAMILY MEDICINE

## 2019-08-05 PROCEDURE — 82947 ASSAY GLUCOSE BLOOD QUANT: CPT

## 2019-08-05 PROCEDURE — 36415 COLL VENOUS BLD VENIPUNCTURE: CPT

## 2019-08-05 RX ORDER — DEXAMETHASONE SODIUM PHOSPHATE 4 MG/ML
INJECTION, SOLUTION INTRA-ARTICULAR; INTRALESIONAL; INTRAMUSCULAR; INTRAVENOUS; SOFT TISSUE PRN
Status: DISCONTINUED | OUTPATIENT
Start: 2019-08-05 | End: 2019-08-05 | Stop reason: SDUPTHER

## 2019-08-05 RX ORDER — LABETALOL 20 MG/4 ML (5 MG/ML) INTRAVENOUS SYRINGE
5 EVERY 10 MIN PRN
Status: DISCONTINUED | OUTPATIENT
Start: 2019-08-05 | End: 2019-08-05 | Stop reason: HOSPADM

## 2019-08-05 RX ORDER — DIPHENHYDRAMINE HYDROCHLORIDE 50 MG/ML
12.5 INJECTION INTRAMUSCULAR; INTRAVENOUS
Status: DISCONTINUED | OUTPATIENT
Start: 2019-08-05 | End: 2019-08-05 | Stop reason: HOSPADM

## 2019-08-05 RX ORDER — ONDANSETRON 2 MG/ML
4 INJECTION INTRAMUSCULAR; INTRAVENOUS
Status: DISCONTINUED | OUTPATIENT
Start: 2019-08-05 | End: 2019-08-05 | Stop reason: HOSPADM

## 2019-08-05 RX ORDER — ONDANSETRON 2 MG/ML
INJECTION INTRAMUSCULAR; INTRAVENOUS PRN
Status: DISCONTINUED | OUTPATIENT
Start: 2019-08-05 | End: 2019-08-05 | Stop reason: SDUPTHER

## 2019-08-05 RX ORDER — DEXTROSE MONOHYDRATE 50 MG/ML
100 INJECTION, SOLUTION INTRAVENOUS PRN
Status: DISCONTINUED | OUTPATIENT
Start: 2019-08-05 | End: 2019-08-07 | Stop reason: HOSPADM

## 2019-08-05 RX ORDER — ROCURONIUM BROMIDE 10 MG/ML
INJECTION, SOLUTION INTRAVENOUS PRN
Status: DISCONTINUED | OUTPATIENT
Start: 2019-08-05 | End: 2019-08-05 | Stop reason: SDUPTHER

## 2019-08-05 RX ORDER — EPHEDRINE SULFATE/0.9% NACL/PF 50 MG/5 ML
SYRINGE (ML) INTRAVENOUS PRN
Status: DISCONTINUED | OUTPATIENT
Start: 2019-08-05 | End: 2019-08-05 | Stop reason: SDUPTHER

## 2019-08-05 RX ORDER — FENTANYL CITRATE 50 UG/ML
INJECTION, SOLUTION INTRAMUSCULAR; INTRAVENOUS PRN
Status: DISCONTINUED | OUTPATIENT
Start: 2019-08-05 | End: 2019-08-05 | Stop reason: SDUPTHER

## 2019-08-05 RX ORDER — PROPOFOL 10 MG/ML
INJECTION, EMULSION INTRAVENOUS PRN
Status: DISCONTINUED | OUTPATIENT
Start: 2019-08-05 | End: 2019-08-05 | Stop reason: SDUPTHER

## 2019-08-05 RX ORDER — LIDOCAINE HYDROCHLORIDE 10 MG/ML
INJECTION, SOLUTION INFILTRATION; PERINEURAL PRN
Status: DISCONTINUED | OUTPATIENT
Start: 2019-08-05 | End: 2019-08-05 | Stop reason: SDUPTHER

## 2019-08-05 RX ORDER — DEXTROSE MONOHYDRATE 25 G/50ML
12.5 INJECTION, SOLUTION INTRAVENOUS PRN
Status: DISCONTINUED | OUTPATIENT
Start: 2019-08-05 | End: 2019-08-07 | Stop reason: HOSPADM

## 2019-08-05 RX ORDER — HYDRALAZINE HYDROCHLORIDE 50 MG/1
50 TABLET, FILM COATED ORAL EVERY 8 HOURS SCHEDULED
Status: DISCONTINUED | OUTPATIENT
Start: 2019-08-06 | End: 2019-08-07 | Stop reason: HOSPADM

## 2019-08-05 RX ORDER — PHENYLEPHRINE HYDROCHLORIDE 10 MG/ML
INJECTION INTRAVENOUS PRN
Status: DISCONTINUED | OUTPATIENT
Start: 2019-08-05 | End: 2019-08-05 | Stop reason: SDUPTHER

## 2019-08-05 RX ORDER — NICOTINE POLACRILEX 4 MG
15 LOZENGE BUCCAL PRN
Status: DISCONTINUED | OUTPATIENT
Start: 2019-08-05 | End: 2019-08-07 | Stop reason: HOSPADM

## 2019-08-05 RX ORDER — METFORMIN HYDROCHLORIDE 500 MG/1
500 TABLET, EXTENDED RELEASE ORAL
Status: DISCONTINUED | OUTPATIENT
Start: 2019-08-06 | End: 2019-08-07 | Stop reason: HOSPADM

## 2019-08-05 RX ORDER — MEPERIDINE HYDROCHLORIDE 25 MG/ML
12.5 INJECTION INTRAMUSCULAR; INTRAVENOUS; SUBCUTANEOUS EVERY 5 MIN PRN
Status: DISCONTINUED | OUTPATIENT
Start: 2019-08-05 | End: 2019-08-05 | Stop reason: HOSPADM

## 2019-08-05 RX ORDER — HYDRALAZINE HYDROCHLORIDE 20 MG/ML
5 INJECTION INTRAMUSCULAR; INTRAVENOUS EVERY 6 HOURS PRN
Status: DISCONTINUED | OUTPATIENT
Start: 2019-08-05 | End: 2019-08-07 | Stop reason: HOSPADM

## 2019-08-05 RX ORDER — MORPHINE SULFATE 2 MG/ML
2 INJECTION, SOLUTION INTRAMUSCULAR; INTRAVENOUS EVERY 5 MIN PRN
Status: DISCONTINUED | OUTPATIENT
Start: 2019-08-05 | End: 2019-08-05 | Stop reason: HOSPADM

## 2019-08-05 RX ORDER — SODIUM CHLORIDE 9 MG/ML
INJECTION, SOLUTION INTRAVENOUS CONTINUOUS
Status: DISCONTINUED | OUTPATIENT
Start: 2019-08-05 | End: 2019-08-05

## 2019-08-05 RX ADMIN — CIPROFLOXACIN 400 MG: 2 INJECTION, SOLUTION INTRAVENOUS at 19:43

## 2019-08-05 RX ADMIN — HYDRALAZINE HYDROCHLORIDE 50 MG: 50 TABLET, FILM COATED ORAL at 19:43

## 2019-08-05 RX ADMIN — ZOLPIDEM TARTRATE 5 MG: 5 TABLET ORAL at 21:43

## 2019-08-05 RX ADMIN — DIPHENHYDRAMINE HCL 25 MG: 25 TABLET ORAL at 08:32

## 2019-08-05 RX ADMIN — PHENYTOIN SODIUM 100 MG: 100 CAPSULE ORAL at 19:43

## 2019-08-05 RX ADMIN — Medication 10 MG: at 15:56

## 2019-08-05 RX ADMIN — PROPOFOL 50 MG: 10 INJECTION, EMULSION INTRAVENOUS at 16:15

## 2019-08-05 RX ADMIN — CIPROFLOXACIN 400 MG: 2 INJECTION, SOLUTION INTRAVENOUS at 08:28

## 2019-08-05 RX ADMIN — PROPOFOL 100 MG: 10 INJECTION, EMULSION INTRAVENOUS at 15:50

## 2019-08-05 RX ADMIN — SODIUM CHLORIDE: 9 INJECTION, SOLUTION INTRAVENOUS at 14:29

## 2019-08-05 RX ADMIN — PHENYLEPHRINE HYDROCHLORIDE 150 MCG: 10 INJECTION INTRAVENOUS at 16:08

## 2019-08-05 RX ADMIN — HYDRALAZINE HYDROCHLORIDE 50 MG: 50 TABLET, FILM COATED ORAL at 00:10

## 2019-08-05 RX ADMIN — PHENYLEPHRINE HYDROCHLORIDE 150 MCG: 10 INJECTION INTRAVENOUS at 16:17

## 2019-08-05 RX ADMIN — CEFTRIAXONE SODIUM 1 G: 1 INJECTION, POWDER, FOR SOLUTION INTRAMUSCULAR; INTRAVENOUS at 11:00

## 2019-08-05 RX ADMIN — ONDANSETRON 4 MG: 2 INJECTION INTRAMUSCULAR; INTRAVENOUS at 15:56

## 2019-08-05 RX ADMIN — METOPROLOL TARTRATE 75 MG: 25 TABLET ORAL at 19:43

## 2019-08-05 RX ADMIN — HYDRALAZINE HYDROCHLORIDE 5 MG: 20 INJECTION INTRAMUSCULAR; INTRAVENOUS at 08:37

## 2019-08-05 RX ADMIN — DIPHENHYDRAMINE HCL 25 MG: 25 TABLET ORAL at 19:44

## 2019-08-05 RX ADMIN — LIDOCAINE HYDROCHLORIDE 50 MG: 10 INJECTION, SOLUTION INFILTRATION; PERINEURAL at 15:50

## 2019-08-05 RX ADMIN — DEXAMETHASONE SODIUM PHOSPHATE 4 MG: 4 INJECTION, SOLUTION INTRA-ARTICULAR; INTRALESIONAL; INTRAMUSCULAR; INTRAVENOUS; SOFT TISSUE at 15:56

## 2019-08-05 RX ADMIN — Medication 15 MG: at 15:59

## 2019-08-05 RX ADMIN — OXYBUTYNIN CHLORIDE 10 MG: 10 TABLET, EXTENDED RELEASE ORAL at 19:44

## 2019-08-05 RX ADMIN — FENTANYL CITRATE 25 MCG: 50 INJECTION, SOLUTION INTRAMUSCULAR; INTRAVENOUS at 15:48

## 2019-08-05 RX ADMIN — ACETAMINOPHEN 650 MG: 325 TABLET, FILM COATED ORAL at 10:44

## 2019-08-05 RX ADMIN — ROCURONIUM BROMIDE 10 MG: 10 INJECTION INTRAVENOUS at 15:50

## 2019-08-05 ASSESSMENT — PULMONARY FUNCTION TESTS
PIF_VALUE: 17
PIF_VALUE: 1
PIF_VALUE: 16
PIF_VALUE: 16
PIF_VALUE: 17
PIF_VALUE: 1
PIF_VALUE: 3
PIF_VALUE: 17
PIF_VALUE: 5
PIF_VALUE: 16
PIF_VALUE: 16
PIF_VALUE: 17
PIF_VALUE: 17
PIF_VALUE: 3
PIF_VALUE: 17
PIF_VALUE: 17
PIF_VALUE: 16
PIF_VALUE: 1
PIF_VALUE: 2
PIF_VALUE: 2
PIF_VALUE: 17
PIF_VALUE: 16
PIF_VALUE: 17
PIF_VALUE: 19
PIF_VALUE: 17
PIF_VALUE: 2
PIF_VALUE: 17
PIF_VALUE: 17
PIF_VALUE: 0
PIF_VALUE: 6
PIF_VALUE: 17
PIF_VALUE: 18
PIF_VALUE: 17
PIF_VALUE: 16
PIF_VALUE: 2
PIF_VALUE: 16
PIF_VALUE: 0
PIF_VALUE: 16
PIF_VALUE: 16
PIF_VALUE: 21
PIF_VALUE: 0
PIF_VALUE: 12
PIF_VALUE: 17
PIF_VALUE: 17
PIF_VALUE: 16
PIF_VALUE: 17
PIF_VALUE: 16
PIF_VALUE: 17
PIF_VALUE: 22
PIF_VALUE: 16
PIF_VALUE: 3
PIF_VALUE: 3
PIF_VALUE: 17
PIF_VALUE: 17
PIF_VALUE: 0
PIF_VALUE: 16
PIF_VALUE: 16

## 2019-08-05 ASSESSMENT — ENCOUNTER SYMPTOMS
STRIDOR: 0
SHORTNESS OF BREATH: 1
BACK PAIN: 1
GASTROINTESTINAL NEGATIVE: 1
RESPIRATORY NEGATIVE: 1

## 2019-08-05 ASSESSMENT — PAIN - FUNCTIONAL ASSESSMENT: PAIN_FUNCTIONAL_ASSESSMENT: 0-10

## 2019-08-05 ASSESSMENT — LIFESTYLE VARIABLES: SMOKING_STATUS: 0

## 2019-08-05 ASSESSMENT — PAIN SCALES - GENERAL
PAINLEVEL_OUTOF10: 0
PAINLEVEL_OUTOF10: 0
PAINLEVEL_OUTOF10: 4

## 2019-08-05 NOTE — ANESTHESIA PRE PROCEDURE
 Gait disturbance R26.9    Heart disease I51.9    Heartburn R12    Hoarseness R49.0    Hypothyroidism E03.9    Mitral regurgitation I34.0    Postmenopausal atrophic vaginitis N95.2    Reflux esophagitis K21.0    Stroke syndrome [id number]    Tinea corporis B35.4    Tricuspid regurgitation I07.1    Type 2 diabetes mellitus without complication, with long-term current use of insulin ([facility]) E11.9, Z79.4    Dyspnea R06.00    History of skin cancer Z85.828    Knee injury, right, sequela S89. 91XS    Traumatic hematoma of right knee S80. 01XA    Diarrhea R19.7    Bilateral carotid artery stenosis I65.23    Bilateral pneumonia J18.9    Carcinoma of endometrium ([facility]) C54.1    Chest pain R07.9    Coronary atherosclerosis I25.10    Functional urinary incontinence R39.81    Weakness R53.1    Hematuria R31.9    Nephrolithiasis N20.0    Suprapubic catheter ([facility]) Z93.59    Acute cystitis with hematuria N30.01       Past Medical History:        Diagnosis Date    Acute [facility]     Arthritis     RIGHT  KNEE    CAD (coronary artery disease)     Cervical cancer ([facility]     Hysterectomy    Convulsions ([facility] 10/23/2015    Full dentures     Upper only    GERD (gastroesophageal reflux disease)     History of congestive heart disease     [place] (hard of hearing[place]     Hyperlipidemia     Hypertension     Presence of indwelling Bourgeois catheter     Prolonged emergence from general anesthesia     S/P CABG x 3 1996    Seizure disorder ([facility] 10/23/2015    From stroke, on Dilantin. PATIENT STATES SEIZURE WAS IN 2008. ONLY HAD 1 SEIZURE.     Thyroid disease     Type II or unspecified type diabetes mellitus without mention of complication, not stated as uncontrolled     on Insulin & Metformin    Unspecified cerebral artery occlusion with cerebral infarction 1998    Slight Lt Arm and Leg Residual Numbess    Uses roller walker     Wears glasses        Past Surgical History:        Procedure Laterality Date    05/22/19 153 lb 12.8 oz (69.8 kg)     Body mass index is 27.93 kg/m².     CBC:   Lab Results   Component Value Date    WBC 12.9 08/05/2019    RBC 3.20 08/05/2019    HGB 10.5 08/05/2019    HCT 32.1 08/05/2019    .2 08/05/2019    RDW 13.4 08/05/2019     08/05/2019     HB 10.5    CMP:   Lab Results   Component Value Date     08/05/2019    K 4.1 08/05/2019     08/05/2019    CO2 27 08/05/2019    BUN 18 08/05/2019    CREATININE 0.80 08/05/2019    GFRAA >60 08/05/2019    LABGLOM >60 08/05/2019    GLUCOSE 127 08/05/2019    PROT 7.5 08/01/2019    CALCIUM 9.0 08/05/2019    BILITOT <0.15 08/01/2019    ALKPHOS 106 08/01/2019    AST 20 08/01/2019    ALT 13 08/01/2019       POC Tests:   Recent Labs     08/05/19  1119   POCGLU 201*       Coags:   Lab Results   Component Value Date    PROTIME 13.3 08/01/2019    INR 1.0 08/01/2019    APTT 43.6 08/01/2019       HCG (If Applicable): No results found for: PREGTESTUR, PREGSERUM, HCG, HCGQUANT     ABGs: No results found for: PHART, PO2ART, SKG8XYI, TVC4RMO, BEART, K8QNCWYL     Type & Screen (If Applicable):  No results found for: LABABO, [address]    Anesthesia Evaluation  Patient summary reviewed no history of anesthetic complications:   Airway: Mallampati: II  TM distance: >3 FB   Neck ROM: full  Mouth opening: > = 3 FB Dental:    (+) edentulous      Pulmonary:normal exam  breath sounds clear to auscultation  (+) pneumonia: no interval change,  shortness of breath: no interval change,      (-) COPD, asthma, sleep apnea, rhonchi, wheezes, rales, stridor and not a current smoker                           Cardiovascular:  Exercise tolerance: poor (<4 METS),   (+) hypertension: no interval change, past MI: no interval change, CAD: no interval change, CABG/stent:,     (-) dysrhythmias,  angina,  CHF, orthopnea, PND,  DOYLE, murmur, weak pulses,  friction rub, systolic click, carotid bruit,  JVD and peripheral edema    ECG reviewed  Rhythm: regular  Rate:

## 2019-08-05 NOTE — BRIEF OP NOTE
Brief Postoperative Note  ______________________________________________________________    Patient: Bonita Farley  YOB: 1934  MRN: [id number]  Date of Procedure: 8/5/2019    Pre-Op Diagnosis: GROSS HEMATURIA    Post-Op Diagnosis: Same       Procedure(s):  CYSTOSCOPY, BLADDER BIOPSY AND FULGURATION    Anesthesia: General, Monitor Anesthesia Care    Surgeon(s):  Sabine Aragon MD    Assistant:     Estimated Blood Loss (mL): less than 50     Complications: None    Specimens:   ID Type Source Tests Collected by Time Destination   A : BLADDER BIOPSIES Tissue Bladder SURGICAL PATHOLOGY Sabine Aragon MD 8/5/2019 1624        Implants:  * No implants in log *      Drains:   Urethral Catheter Triple-lumen 22 fr (Active)   Catheter Indications Urology/Urologist seeing this patient or inserted indwelling catheter 8/5/2019  4:45 PM   Securement Device Date Changed 08/05/19 8/5/2019  4:45 PM   Site Assessment No urethral drainage 8/5/2019  4:45 PM   Urine Color Colorless 8/5/2019  4:45 PM   Urine Appearance Clear 8/5/2019  4:45 PM       Suprapubic Catheter Triple-lumen 24 fr (Active)   Site Assessment Pink; Other (Comment) 8/2/2019  8:00 PM   Dressing Status Clean;Dry; Intact 8/2/2019  3:59 AM   Dressing Type Open to air 8/5/2019  4:45 PM   Urine Color Other (Comment) 8/5/2019  4:45 PM   Urine Appearance Clear 8/5/2019  6:31 AM   Output (mL) 750 mL 8/5/2019  1:53 PM       [REMOVED] Urethral Catheter Double-lumen 14 fr (Removed)       [REMOVED] Urethral Catheter  22 fr (Removed)   Catheter Indications Urology/Urologist seeing this patient or inserted indwelling catheter 8/5/2019  8:00 AM   Site Assessment Urethral drainage 8/5/2019  8:00 AM   Urine Color Cherry 8/4/2019  7:00 AM   Urine Appearance Clots 8/5/2019  8:00 AM       [REMOVED] Suprapubic Catheter Non-latex; Double-lumen 14 fr (Removed)       [REMOVED] Suprapubic Catheter Non-latex (Removed)   Urine Color Red 8/2/2019 12:00 AM   Urine Appearance

## 2019-08-05 NOTE — ANESTHESIA POSTPROCEDURE EVALUATION
POST- ANESTHESIA EVALUATION       Pt Name: Berl Canavan  MRN: [id number]  YOB: 1934  Date of evaluation: 8/5/2019  Time:  5:32 PM      BP (!) 155/38   Pulse 92   Temp 97.5 °F (36.4 °C) (Infrared)   Resp 17   Ht 5' 4\" (1.626 m)   Wt 162 lb 11.2 oz (73.8 kg)   SpO2 94%   BMI 27.93 kg/m²      Consciousness Level  Awake  Cardiopulmonary Status  Stable  Pain Adequately Treated YES  Nausea / Vomiting  NO  Adequate Hydration  YES  Anesthesia Related Complications NONE      Electronically signed by Enedelia Balderrama MD on 8/5/2019 at 5:32 PM       Department of Anesthesiology  Postprocedure Note    Patient: Berl Canavan  MRN: [id number]  YOB: 1934  Date of evaluation: 8/5/2019  Time:  5:32 PM     Procedure Summary     Date:  08/05/19 Room / Location:  [facility] 01 / [id number]    Anesthesia Start:  2846 Anesthesia Stop:  6145    Procedure:  CYSTOSCOPY, BLADDER BIOPSY AND FULGURATION (N/A Bladder) Diagnosis:  (GROSS HEMATURIA)    Surgeon:  Brenda Martel MD Responsible Provider:  Enedelia Balderrama MD    Anesthesia Type:  general, MAC ASA Status:  4          Anesthesia Type: general, MAC    Leobardo Phase I: Leobardo Score: 9    Leobardo Phase II:      Last vitals: Reviewed and per EMR flowsheets.        Anesthesia Post Evaluation

## 2019-08-05 NOTE — H&P
GENERAL HISTORY AND PHYSICAL  8/5/2019    PROBLEM:  does not have any pertinent problems on file. HISTORY OF PRESENT ILLNESS:  Gabino Gaucher is an 80 y.o. female. She states gross hematuria started last Thursday night. Came to ER. Continues to have hematuria despite bladder irrigations. Having bladder spasms. Did not have a good night last night. Denies any fever, chills, abdomen pain. PAST MEDICAL HISTORY:   has a past medical history of Acute MI ([address] Arthritis, CAD (coronary artery disease), Cervical cancer ([address] Convulsions ([address] Full dentures, GERD (gastroesophageal reflux disease), History of congestive heart disease, [place] (hard of hearing), Hyperlipidemia, Hypertension, Presence of indwelling Bourgeois catheter, Prolonged emergence from general anesthesia, S/P CABG x 3, Seizure disorder ([address] Thyroid disease, Type II or unspecified type diabetes mellitus without mention of complication, not stated as uncontrolled, Unspecified cerebral artery occlusion with cerebral infarction, Uses roller walker, and Wears glasses. PAST SURGICAL HISTORY:   has a past surgical history that includes Carotid endarterectomy (Right); Tonsillectomy; Hysterectomy; Colonoscopy (about 2 years ago); Coronary artery bypass graft (1996); pr remv cataract extracap,insert lens (Left, 8/22/2017); pr remv cataract extracap,insert lens (Right, 9/5/2017); pr injection for bladder x-ray (N/A, 7/27/2018); pr cystourethroscopy (N/A, 7/27/2018); Cardiac surgery (1996); eye surgery (Bilateral, 2017); Cholecystectomy; Cystoscopy (08/31/2018); and pr office/outpt visit,procedure only (N/A, 8/31/2018). SOCIAL HISTORY: lives alone  Social History     Tobacco Use    Smoking status: Never Smoker    Smokeless tobacco: Never Used   Substance Use Topics    Alcohol use: No       ALLERGIES:  has No Known Allergies. HOME MEDICATIONS:  Prior to Admission medications    Medication Sig Start Date End Date Taking?  Authorizing Brittney Romero MD   pantoprazole (PROTONIX) 40 MG tablet TAKE 1 TABLET DAILY 8/24/18  Yes Bigg Mccall MD   FREESTYLE LANCETS MISC Three times daily. E11.9 8/9/18  Yes Bigg Mccall MD   Nitroglycerin 400 MCG/SPRAY AERS PLACE 1 SPRAY UNDER THE TONGUE EVERY 5 MINUTES AS NEEDED (CHEST PAIN) FOR UP TO 3 DOSES 1/25/18  Yes Bigg Mccall MD   mometasone (ELOCON) 0.1 % cream Apply topically 2 times daily as needed Apply topically daily. Yes Historical Provider, MD   acetaminophen (TYLENOL) 500 MG tablet Take 2 tablets by mouth every 6 hours as needed. 12/3/14  Yes Bigg Mccall MD   aspirin 325 MG tablet Take 325 mg by mouth daily.    Yes Historical Provider, MD   metoprolol tartrate (LOPRESSOR) 25 MG tablet  2/9/19   Historical Provider, MD   docusate sodium (COLACE) 100 MG capsule Take 1 capsule by mouth 2 times daily as needed for Constipation 8/31/18   Latisha Luciano MD    Scheduled Meds:   ciprofloxacin  400 mg Intravenous Q12H    oxybutynin  10 mg Oral BID    trospium  20 mg Oral BID AC    sodium chloride flush  10 mL Intravenous 2 times per day    cefTRIAXone (ROCEPHIN) IV  1 g Intravenous Q24H    amLODIPine  10 mg Oral Daily    atorvastatin  40 mg Oral Daily    furosemide  40 mg Oral Daily    hydrALAZINE  50 mg Oral 3 times per day    insulin NPH  27 Units Subcutaneous QAM    levothyroxine  137 mcg Oral Daily    losartan  100 mg Oral Daily    metoprolol tartrate  75 mg Oral BID    nitroGLYCERIN  1 patch Transdermal Daily    pantoprazole  40 mg Oral Daily    phenytoin  100 mg Oral BID    potassium chloride  20 mEq Oral Daily     Continuous Infusions:  PRN Meds:.hydrALAZINE, zolpidem, diphenhydrAMINE, sodium chloride flush, sodium chloride flush, acetaminophen, docusate sodium, acetaminophen, diphenoxylate-atropine    IMMUNIZATIONS:  Immunization History   Administered Date(s) Administered    Influenza Virus Vaccine 09/01/2011, 11/20/2012, 10/01/2013, 12/02/2014    Influenza, High Dose

## 2019-08-05 NOTE — PROGRESS NOTES
Brother     No Known Problems Maternal Grandmother     No Known Problems Maternal Grandfather     No Known Problems Paternal Grandmother     No Known Problems Paternal Grandfather     High Blood Pressure Daughter     Diabetes Daughter     No Known Problems Son        Review of Systems:  Constitutional: Negative for fever, chills and activity change. Eyes: Negative for pain, redness and visual disturbance. Respiratory: Negative for cough, shortness of breath and wheezing. Cardiovascular: Negative for chest pain and leg swelling. Gastrointestinal: Negative for nausea, vomiting and abdominal pain. Endocrine: Negative for polydipsia and polyphagia. Genitourinary: Negative for dysuria, frequency, hematuria, flank pain and difficulty urinating. Musculoskeletal: Negative for myalgias, back pain and joint swelling. Skin: Negative for color change, rash and wound. Allergic/Immunologic: Negative for environmental allergies and food allergies. Neurological: Negative for dizziness, tremors and numbness. Hematological: Negative for adenopathy. Does not bruise/bleed easily. Psychiatric/Behavioral: Negative for confusion and dysphoric mood. The patient is not nervous/anxious.        Patient Vitals for the past 24 hrs:   BP Temp Temp src Pulse Resp SpO2 Weight   08/05/19 0626 (!) 163/37 97.7 °F (36.5 °C) Oral 63 15 96 % --   08/05/19 0500 -- -- -- -- -- -- 162 lb 11.2 oz (73.8 kg)   08/05/19 0009 (!) 189/86 -- -- -- -- 97 % --   08/04/19 1929 (!) 134/43 97.7 °F (36.5 °C) Oral 68 14 97 % --   08/04/19 1349 (!) 140/30 97.7 °F (36.5 °C) Oral 62 16 96 % --       Intake/Output Summary (Last 24 hours) at 8/5/2019 0728  Last data filed at 8/5/2019 1412  Gross per 24 hour   Intake --   Output 81222 ml   Net -50068 ml       Recent Labs     08/02/19  0802 08/04/19  0855 08/05/19  0605   WBC 13.1* 10.5 12.9*   HGB 10.7* 9.3* 10.5*   HCT 32.5* 28.6* 32.1*   .6* 100.0 100.2*    225 245     Recent Labs

## 2019-08-06 LAB
ABSOLUTE EOS #: 0.1 K/UL (ref 0–0.4)
ABSOLUTE IMMATURE GRANULOCYTE: ABNORMAL K/UL (ref 0–0.3)
ABSOLUTE LYMPH #: 4 K/UL (ref 1–4.8)
ABSOLUTE MONO #: 1.6 K/UL (ref 0.1–1.3)
ANION GAP SERPL CALCULATED.3IONS-SCNC: 9 MMOL/L (ref 9–17)
BASOPHILS # BLD: 1 % (ref 0–2)
BASOPHILS ABSOLUTE: 0.1 K/UL (ref 0–0.2)
BUN BLDV-MCNC: 20 MG/DL (ref 8–23)
BUN/CREAT BLD: ABNORMAL (ref 9–20)
CALCIUM SERPL-MCNC: 8.3 MG/DL (ref 8.6–10.4)
CHLORIDE BLD-SCNC: 105 MMOL/L (ref 98–107)
CO2: 25 MMOL/L (ref 20–31)
CREAT SERPL-MCNC: 0.86 MG/DL (ref 0.5–0.9)
DIFFERENTIAL TYPE: ABNORMAL
EOSINOPHILS RELATIVE PERCENT: 1 % (ref 0–4)
GFR AFRICAN AMERICAN: >60 ML/MIN
GFR NON-AFRICAN AMERICAN: >60 ML/MIN
GFR SERPL CREATININE-BSD FRML MDRD: ABNORMAL ML/MIN/{1.73_M2}
GFR SERPL CREATININE-BSD FRML MDRD: ABNORMAL ML/MIN/{1.73_M2}
GLUCOSE BLD-MCNC: 108 MG/DL (ref 65–105)
GLUCOSE BLD-MCNC: 155 MG/DL (ref 65–105)
GLUCOSE BLD-MCNC: 165 MG/DL (ref 70–99)
GLUCOSE BLD-MCNC: 261 MG/DL (ref 65–105)
GLUCOSE BLD-MCNC: 89 MG/DL (ref 65–105)
GLUCOSE BLD-MCNC: 92 MG/DL (ref 65–105)
HCT VFR BLD CALC: 26.3 % (ref 36–46)
HEMOGLOBIN: 8.6 G/DL (ref 12–16)
IMMATURE GRANULOCYTES: ABNORMAL %
LYMPHOCYTES # BLD: 32 % (ref 24–44)
MCH RBC QN AUTO: 33.1 PG (ref 26–34)
MCHC RBC AUTO-ENTMCNC: 32.6 G/DL (ref 31–37)
MCV RBC AUTO: 101.4 FL (ref 80–100)
MONOCYTES # BLD: 13 % (ref 1–7)
NRBC AUTOMATED: ABNORMAL PER 100 WBC
PDW BLD-RTO: 13.4 % (ref 11.5–14.9)
PLATELET # BLD: 221 K/UL (ref 150–450)
PLATELET ESTIMATE: ABNORMAL
PMV BLD AUTO: 8.9 FL (ref 6–12)
POTASSIUM SERPL-SCNC: 4.1 MMOL/L (ref 3.7–5.3)
RBC # BLD: 2.59 M/UL (ref 4–5.2)
RBC # BLD: ABNORMAL 10*6/UL
SEG NEUTROPHILS: 53 % (ref 36–66)
SEGMENTED NEUTROPHILS ABSOLUTE COUNT: 6.9 K/UL (ref 1.3–9.1)
SODIUM BLD-SCNC: 139 MMOL/L (ref 135–144)
WBC # BLD: 12.6 K/UL (ref 3.5–11)
WBC # BLD: ABNORMAL 10*3/UL

## 2019-08-06 PROCEDURE — 99233 SBSQ HOSP IP/OBS HIGH 50: CPT | Performed by: FAMILY MEDICINE

## 2019-08-06 PROCEDURE — 2580000003 HC RX 258: Performed by: UROLOGY

## 2019-08-06 PROCEDURE — 6370000000 HC RX 637 (ALT 250 FOR IP): Performed by: UROLOGY

## 2019-08-06 PROCEDURE — 97166 OT EVAL MOD COMPLEX 45 MIN: CPT

## 2019-08-06 PROCEDURE — 2580000003 HC RX 258: Performed by: FAMILY MEDICINE

## 2019-08-06 PROCEDURE — 97162 PT EVAL MOD COMPLEX 30 MIN: CPT

## 2019-08-06 PROCEDURE — 85025 COMPLETE CBC W/AUTO DIFF WBC: CPT

## 2019-08-06 PROCEDURE — 6360000002 HC RX W HCPCS: Performed by: FAMILY MEDICINE

## 2019-08-06 PROCEDURE — 1200000000 HC SEMI PRIVATE

## 2019-08-06 PROCEDURE — 6370000000 HC RX 637 (ALT 250 FOR IP): Performed by: FAMILY MEDICINE

## 2019-08-06 PROCEDURE — 36415 COLL VENOUS BLD VENIPUNCTURE: CPT

## 2019-08-06 PROCEDURE — 80048 BASIC METABOLIC PNL TOTAL CA: CPT

## 2019-08-06 PROCEDURE — 82947 ASSAY GLUCOSE BLOOD QUANT: CPT

## 2019-08-06 RX ORDER — CIPROFLOXACIN 500 MG/1
500 TABLET, FILM COATED ORAL EVERY 12 HOURS SCHEDULED
Status: DISCONTINUED | OUTPATIENT
Start: 2019-08-06 | End: 2019-08-07 | Stop reason: HOSPADM

## 2019-08-06 RX ADMIN — FUROSEMIDE 40 MG: 40 TABLET ORAL at 08:03

## 2019-08-06 RX ADMIN — METFORMIN HYDROCHLORIDE 500 MG: 500 TABLET, EXTENDED RELEASE ORAL at 08:03

## 2019-08-06 RX ADMIN — PHENYTOIN SODIUM 100 MG: 100 CAPSULE ORAL at 08:03

## 2019-08-06 RX ADMIN — PHENYTOIN SODIUM 100 MG: 100 CAPSULE ORAL at 20:10

## 2019-08-06 RX ADMIN — PANTOPRAZOLE SODIUM 40 MG: 40 TABLET, DELAYED RELEASE ORAL at 08:01

## 2019-08-06 RX ADMIN — METOPROLOL TARTRATE 75 MG: 25 TABLET ORAL at 08:03

## 2019-08-06 RX ADMIN — LOSARTAN POTASSIUM 100 MG: 100 TABLET ORAL at 08:03

## 2019-08-06 RX ADMIN — POTASSIUM CHLORIDE 20 MEQ: 20 TABLET, EXTENDED RELEASE ORAL at 08:03

## 2019-08-06 RX ADMIN — HYDRALAZINE HYDROCHLORIDE 50 MG: 50 TABLET, FILM COATED ORAL at 14:10

## 2019-08-06 RX ADMIN — DIPHENHYDRAMINE HCL 25 MG: 25 TABLET ORAL at 08:02

## 2019-08-06 RX ADMIN — AMLODIPINE BESYLATE 10 MG: 10 TABLET ORAL at 08:02

## 2019-08-06 RX ADMIN — OXYBUTYNIN CHLORIDE 10 MG: 10 TABLET, EXTENDED RELEASE ORAL at 20:09

## 2019-08-06 RX ADMIN — METOPROLOL TARTRATE 75 MG: 25 TABLET ORAL at 20:10

## 2019-08-06 RX ADMIN — CIPROFLOXACIN 500 MG: 500 TABLET, FILM COATED ORAL at 08:06

## 2019-08-06 RX ADMIN — TROSPIUM CHLORIDE 20 MG: 20 TABLET, FILM COATED ORAL at 06:08

## 2019-08-06 RX ADMIN — CIPROFLOXACIN 500 MG: 500 TABLET, FILM COATED ORAL at 20:10

## 2019-08-06 RX ADMIN — ACETAMINOPHEN 1000 MG: 500 TABLET, FILM COATED ORAL at 13:18

## 2019-08-06 RX ADMIN — INSULIN HUMAN 27 UNITS: 100 INJECTION, SUSPENSION SUBCUTANEOUS at 09:56

## 2019-08-06 RX ADMIN — ACETAMINOPHEN 1000 MG: 500 TABLET, FILM COATED ORAL at 06:13

## 2019-08-06 RX ADMIN — CEFTRIAXONE SODIUM 1 G: 1 INJECTION, POWDER, FOR SOLUTION INTRAMUSCULAR; INTRAVENOUS at 08:06

## 2019-08-06 RX ADMIN — Medication 10 ML: at 20:11

## 2019-08-06 RX ADMIN — OXYBUTYNIN CHLORIDE 10 MG: 10 TABLET, EXTENDED RELEASE ORAL at 08:02

## 2019-08-06 RX ADMIN — ACETAMINOPHEN 1000 MG: 500 TABLET, FILM COATED ORAL at 20:09

## 2019-08-06 RX ADMIN — LEVOTHYROXINE SODIUM 137 MCG: 137 TABLET ORAL at 06:09

## 2019-08-06 RX ADMIN — Medication 10 ML: at 08:01

## 2019-08-06 RX ADMIN — HYDRALAZINE HYDROCHLORIDE 50 MG: 50 TABLET, FILM COATED ORAL at 06:08

## 2019-08-06 RX ADMIN — HYDRALAZINE HYDROCHLORIDE 50 MG: 50 TABLET, FILM COATED ORAL at 20:10

## 2019-08-06 RX ADMIN — TROSPIUM CHLORIDE 20 MG: 20 TABLET, FILM COATED ORAL at 20:10

## 2019-08-06 RX ADMIN — ATORVASTATIN CALCIUM 40 MG: 40 TABLET, FILM COATED ORAL at 08:03

## 2019-08-06 ASSESSMENT — PAIN DESCRIPTION - DESCRIPTORS: DESCRIPTORS: HEADACHE

## 2019-08-06 ASSESSMENT — PAIN SCALES - GENERAL
PAINLEVEL_OUTOF10: 2
PAINLEVEL_OUTOF10: 10
PAINLEVEL_OUTOF10: 0
PAINLEVEL_OUTOF10: 3
PAINLEVEL_OUTOF10: 0

## 2019-08-06 ASSESSMENT — PAIN DESCRIPTION - LOCATION: LOCATION: HEAD

## 2019-08-06 ASSESSMENT — PAIN DESCRIPTION - PAIN TYPE: TYPE: ACUTE PAIN

## 2019-08-06 NOTE — CARE COORDINATION
ONGOING DISCHARGE PLAN:    Spoke with patient regarding discharge plan and patient confirms that plan is still home with [facility]. Pt is aware that PT is recommending 24 hour supervision/assist for the first few days. She states she will talk to her daughters about this and see if they will be able to stay with her. Active order for IV Rocephin. POD#1 cysto with blot evacuation and bladder bx. Will continue to follow for additional discharge needs.     Electronically signed by Christiano Phillips RN on 8/6/2019 at 11:14 AM

## 2019-08-06 NOTE — PLAN OF CARE
Problem: Falls - Risk of:  Goal: Will remain free from falls  Description  Will remain free from falls  8/6/2019 1607 by Yosef Lanza RN  Outcome: Met This Shift  Note:   No falls noted this shift. Patient ambulates with x1 staff assistance without difficulty. Bed kept in low position. Safe environment maintained. Bedside table & call light in reach. Uses call light appropriately when needing assistance. 8/6/2019 0453 by Anne Baum RN  Outcome: Ongoing     Problem: Skin Integrity:  Goal: Skin integrity will stabilize  Description  Skin integrity will stabilize  8/6/2019 1607 by Yosef Lanza RN  Outcome: Ongoing  Note:   Skin assessment performed. See head to toe assessment. Will continue to monitor.      8/6/2019 0453 by Anne Baum RN  Outcome: Ongoing     Problem: Musculor/Skeletal Functional Status  Goal: Highest potential functional level  Outcome: Ongoing

## 2019-08-06 NOTE — PROGRESS NOTES
Physical Therapy    Facility/Department: [facility] MED SURG  Initial Assessment    NAME: Yesi Gamez  : 1934  MRN: [id number]    Date of Service: 2019    Discharge Recommendations:  24 hour supervision or assist, Home with Home health PT(provided family can provide 24 hour assist)   PT Equipment Recommendations  Equipment Needed: No    Assessment   Body structures, Functions, Activity limitations: Decreased functional mobility ; Decreased strength;Decreased balance  Assessment: continue per POC to maxmize potential for safe D/C home  Treatment Diagnosis: impaired mobility  Specific instructions for Next Treatment: 19 HOME W/ 24 HOUR ASSIST AND HOME PT; gait w/ w walker 12' w/ min x 1  Prognosis: Good  Decision Making: Medium Complexity  History: admitted due to hematuria  Exam: ROM, MMT, balance and mobility assessments  Clinical Presentation:  gait w/ w walker 12' w/ min x 1  PT Education: Goals;PT Role;Plan of Care; Functional Mobility Training  Barriers to Learning: none  REQUIRES PT FOLLOW UP: Yes  Activity Tolerance  Activity Tolerance: Patient limited by fatigue;Patient limited by endurance       Patient Diagnosis(es): The encounter diagnosis was Hematuria, unspecified type. has a past medical history of Acute MI ([address] Arthritis, CAD (coronary artery disease), Cervical cancer ([address] Convulsions ([address] Full dentures, GERD (gastroesophageal reflux disease), History of congestive heart disease, [place] (hard of hearing), Hyperlipidemia, Hypertension, Presence of indwelling Bourgeois catheter, Prolonged emergence from general anesthesia, S/P CABG x 3, Seizure disorder ([address] Thyroid disease, Type II or unspecified type diabetes mellitus without mention of complication, not stated as uncontrolled, Unspecified cerebral artery occlusion with cerebral infarction, Uses roller walker, and Wears glasses. has a past surgical history that includes Carotid endarterectomy (Right); Tonsillectomy;  Hysterectomy; Colonoscopy (about 2 years ago); Coronary artery bypass graft (1996); pr remv cataract extracap,insert lens (Left, 8/22/2017); pr remv cataract extracap,insert lens (Right, 9/5/2017); pr injection for bladder x-ray (N/A, 7/27/2018); pr cystourethroscopy (N/A, 7/27/2018); Cardiac surgery (1996); eye surgery (Bilateral, 2017); Cholecystectomy; Cystoscopy (08/31/2018); pr office/outpt visit,procedure only (N/A, 8/31/2018); and cystoscopy w biopsy of bladder (N/A, 8/5/2019). Restrictions  Restrictions/Precautions  Restrictions/Precautions: Fall Risk, General Precautions, Seizure(Suprapubic catheter, peripheral IV right antecubital)  Required Braces or Orthoses?: No  Vision/Hearing  Vision: Impaired  Vision Exceptions: Wears glasses at all times  Hearing: Exceptions to [facility]  Hearing Exceptions: Hard of hearing/hearing concerns; No hearing aid     Subjective  General  Patient assessed for rehabilitation services?: Yes  Additional Pertinent Hx: hx CVA w/ left arm and leg weakness 1998  Response To Previous Treatment: Not applicable  Family / Caregiver Present: No  Referring Practitioner: Dr. Geronimo Carl  Referral Date : 08/05/19  Diagnosis: hematuria  Follows Commands: Within Functional Limits  General Comment  Comments: Pt had cystoscopy w/ evacuation of clots on 8-5-19.   Subjective  Subjective: C/O generalized weakness as she has not been up in days  Pain Screening  Patient Currently in Pain: Denies  Vital Signs  BP Location: Right upper arm  Level of Consciousness: Alert  Patient Currently in Pain: Denies  Oxygen Therapy  O2 Device: None (Room air)       Orientation  Orientation  Overall Orientation Status: Within Normal Limits  Social/Functional History  Social/Functional History  Lives With: Alone  Type of Home: House  Home Layout: One level  Home Access: Stairs to enter without rails(Reports daughter is always with her on the step)  Entrance Stairs - Number of Steps: 1  Bathroom Shower/Tub: Tub/Shower unit(Daughter is always with her when she showers)  Bathroom Toilet: Handicap height  Bathroom Equipment: Grab bars in shower, Hand-held shower  Bathroom Accessibility: Walker accessible  Home Equipment: Rolling walker  Receives Help From: Family  ADL Assistance: Independent(Except shower - daughter comes to A)  Homemaking Assistance: Needs assistance(Daughter A with cleaning and shopping)  Homemaking Responsibilities: Yes(Pt does cooking and laundry)  Ambulation Assistance: Independent(w/RW)  Transfer Assistance: Independent  Active : No  Patient's  Info: Daughter  Occupation: Retired  Additional Comments: Pt's daughter is retired and able to A as needed. Pt reports she has a second daughter that will retire in November and is also able to A.   Cognition        Objective     Observation/Palpation  Observation: Suprapubic catheter, peripheral IV right antecubital    AROM RLE (degrees)  RLE AROM: WFL  AROM LLE (degrees)  LLE AROM : WFL  AROM RUE (degrees)  RUE General AROM: see OT for UE assessment  AROM LUE (degrees)  LUE General AROM: see OT for UE assessment  Strength RLE  Comment: grossly 3+/5 hip flexors otherwise 4/5  Strength LLE  Comment: grossly 3+/5 hip flexors otherwise 4/5  Strength RUE  Comment: see OT for UE assessment  Strength LUE  Comment: see OT for UE assessment     Sensation  Overall Sensation Status: Impaired  Additional Comments: Pt reports numbness in LUE/LLE since her CVA in 1998  Bed mobility  Rolling to Right: Supervision  Supine to Sit: Supervision  Scooting: Supervision  Comment: dangled at the EOB w/ supervision  Transfers  Sit to Stand: Minimal Assistance  Stand to sit: Minimal Assistance  Bed to Chair: Minimal assistance(used w walker)  Comment: posterior lean  w/ standing, C/O generalized weakness  Ambulation  Ambulation?: Yes  Ambulation 1  Surface: level tile  Device: Rolling Walker  Assistance: Minimal assistance  Gait Deviations: Slow Melissa  Distance: 12' around the

## 2019-08-06 NOTE — OP NOTE
[address]                 [address] [place], [address]                                OPERATIVE REPORT    PATIENT NAME: Arpita Barton              :        1934  MED REC NO:   [id number]                              ROOM:       2057  ACCOUNT NO:   [de-identified]                           ADMIT DATE: 2019  PROVIDER:     Sabine Aragon    DATE OF PROCEDURE:  2019    PREOPERATIVE DIAGNOSIS:  Gross hematuria. POSTOPERATIVE DIAGNOSIS:  Gross hematuria. OPERATION PERFORMED:  Cystoscopy, clot evacuation, bladder biopsy and  fulguration and Bourgeois catheter change. SURGEON:  aSbine Aragon MD    ANESTHESIA:  General.    COMPLICATIONS:  None. BLEEDING:  Minimal.    SPECIMENS:  Bladder biopsies. HISTORY OF PRESENT ILLNESS[de-identified]  The patient is an 25-year-old female known  to my partner Dr. Kristian Mak. She has a chronic SP tube in place and has had  that for about one year. She was admitted last Friday with severe gross  hematuria. She has struggled all weekend with bladder pain and clots. She is here now for cysto with clot evacuation. CT urogram was  otherwise unremarkable other than probable clots in the bladder. PROCEDURE IN DETAIL:  The patient was brought back to the operating room  and laid on the operating table in the supine position. Once general  anesthesia was obtained, she was placed in the dorsal lithotomy  position, and prepped and draped in the usual sterile fashion. A  time-out was performed, she was properly identified. Antibiotics were  administered. The cystoscope was inserted through the urethra. I  immediately used a Urovac evacuator to remove a large amount of clots. Once all the clots were removed, I was able to perform careful  pancystoscopy. Significant catheter cystitis was noted throughout the  entire bladder.   The bladder was visualized in its entirety including  the dome of the posterior wall and

## 2019-08-06 NOTE — DISCHARGE INSTR - COC
CATARACTS WITH IOL PLACED    HYSTERECTOMY      ND CYSTOURETHROSCOPY N/A 7/27/2018    [facility] N/A 7/27/2018    VIDEO  URODYNAMICS performed by Arvind Holt MD at [address] OFFICE/OUTPT [facility] N/A 8/31/2018    CYSTO, SP TUBE PLACEMENT performed by Arvind Holt MD at [facility]. 199 Left 8/22/2017    EYE CATARACT EMULSIFICATION IOL IMPLANT performed by Martha Davidson MD at [facility]. 199 Right 9/5/2017    EYE CATARACT EMULSIFICATION IOL IMPLANT performed by Martha Davidson MD at [facility]         Immunization History:   Immunization History   Administered Date(s) Administered    Influenza Virus Vaccine 09/01/2011, 11/20/2012, 10/01/2013, 12/02/2014    Influenza, High Dose (Fluzone 65 yrs and older) 11/02/2015, 10/11/2016, 11/08/2017, 10/09/2018    Pneumococcal Conjugate 13-valent (Pipsuzi12) 02/13/2016    Pneumococcal Polysaccharide ([id number]) 06/22/2017    Tdap (Boostrix, Adacel) 04/13/2016       Active Problems:  Patient Active Problem List   Diagnosis Code    CVA (cerebral vascular accident) ([facility] I63.9    TIA (transient ischemic attack) G45.9    Essential hypertension I10    Hyperlipidemia with target LDL less than 70 E78.5    Arthritis of knee, degenerative M17.10    Arthritis M19.90    Carcinoma of uterus ([address] C55    Change in voice R49.9    Chronic cough R05    Seizure ([facility] R56.9    Cystocele [id number]    Essential hypertriglyceridemia E78.1    Gait disturbance R26.9    Heart disease I51.9    Heartburn R12    Hoarseness R49.0    Hypothyroidism E03.9    Mitral regurgitation I34.0    Postmenopausal atrophic vaginitis N95.2    Reflux esophagitis K21.0    Stroke syndrome [id number]    Tinea corporis B35.4    Tricuspid regurgitation I07.1    Type 2 diabetes mellitus without complication, with Fluid Restriction: no  Last Modified Barium Swallow with Video (Video Swallowing Test): not done    Treatments at the Time of Hospital Discharge:   Respiratory Treatments: SEE MAR  Oxygen Therapy:  is not on home oxygen therapy. Ventilator:    - No ventilator support    Rehab Therapies: Physical Therapy and Occupational Therapy  Weight Bearing Status/Restrictions: No weight bearing restirctions  Other Medical Equipment (for information only, NOT a DME order):  walker  Other Treatments: skilled nursing assessment, medication education, and continued monitoring. Suprapubic catheter needs to be changed in 1 week. If that is out of scope of practice for the RN at the facility patient needs to make an appointment for one week with the urologist and have it changed in the office. Patient's personal belongings (please select all that are sent with patient):  None    RN SIGNATURE:  Electronically signed by Kinza Darnell RN on 8/7/19 at 10:20 AM    CASE MANAGEMENT/SOCIAL WORK SECTION    Inpatient Status Date: ***    Readmission Risk Assessment Score:  Readmission Risk              Risk of Unplanned Readmission:        18           Discharging to Facility/ [facility]  [address] [facility]  Phone [phone] after hours [phone]  · Fax [phone] after hours [phone]       Please refer patient to [facility] when discharged from your facility for home health services.   [facility]  Phone [phone]  Fax [phone]      Dialysis Facility (if applicable)   · Name:  · Address:  · Dialysis Schedule:  · Phone:  · Fax:    / signature: Electronically signed by SPENCER Luna on 8/7/19 at 11:59 AM    PHYSICIAN SECTION    Prognosis: Good    Condition at Discharge: Stable    Rehab Potential (if transferring to Rehab): Good    Recommended Labs or Other Treatments After Discharge: ***    Physician Certification: I certify the

## 2019-08-06 NOTE — ANESTHESIA POSTPROCEDURE EVALUATION
Department of Anesthesiology  Postprocedure Note    Patient: Yesi Gamez  MRN: [id number]  YOB: 1934  Date of evaluation: 8/6/2019  Time:  7:57 AM     Procedure Summary     Date:  08/05/19 Room / Location:  [address] [place] [address] 01 / [address]    Anesthesia Start:  5156 Anesthesia Stop:  0474    Procedure:  CYSTOSCOPY, BLADDER BIOPSY AND FULGURATION (N/A Bladder) Diagnosis:  (GROSS HEMATURIA)    Surgeon:  Magno Caruso MD Responsible Provider:  Cali Beatty MD    Anesthesia Type:  general, MAC ASA Status:  4          Anesthesia Type: general, MAC    Leobardo Phase I: Leobardo Score: 9    Leobardo Phase II:      Last vitals: Reviewed and per EMR flowsheets. Anesthesia Post Evaluation    Comments: POD #1. Patient seen at bedside. No anesthesia complications reported.

## 2019-08-07 ENCOUNTER — TELEPHONE (OUTPATIENT)
Dept: PRIMARY CARE CLINIC | Age: 84
End: 2019-08-07

## 2019-08-07 VITALS
HEIGHT: 64 IN | OXYGEN SATURATION: 94 % | TEMPERATURE: 99.3 F | WEIGHT: 162.7 LBS | RESPIRATION RATE: 16 BRPM | DIASTOLIC BLOOD PRESSURE: 42 MMHG | SYSTOLIC BLOOD PRESSURE: 162 MMHG | HEART RATE: 65 BPM | BODY MASS INDEX: 27.78 KG/M2

## 2019-08-07 LAB
ABSOLUTE EOS #: 0.2 K/UL (ref 0–0.4)
ABSOLUTE IMMATURE GRANULOCYTE: ABNORMAL K/UL (ref 0–0.3)
ABSOLUTE LYMPH #: 3.3 K/UL (ref 1–4.8)
ABSOLUTE MONO #: 1.2 K/UL (ref 0.1–1.3)
ANION GAP SERPL CALCULATED.3IONS-SCNC: 11 MMOL/L (ref 9–17)
BASOPHILS # BLD: 0 % (ref 0–2)
BASOPHILS ABSOLUTE: 0 K/UL (ref 0–0.2)
BUN BLDV-MCNC: 21 MG/DL (ref 8–23)
BUN/CREAT BLD: ABNORMAL (ref 9–20)
CALCIUM SERPL-MCNC: 8.4 MG/DL (ref 8.6–10.4)
CHLORIDE BLD-SCNC: 105 MMOL/L (ref 98–107)
CO2: 24 MMOL/L (ref 20–31)
CREAT SERPL-MCNC: 0.98 MG/DL (ref 0.5–0.9)
DIFFERENTIAL TYPE: ABNORMAL
EOSINOPHILS RELATIVE PERCENT: 2 % (ref 0–4)
GFR AFRICAN AMERICAN: >60 ML/MIN
GFR NON-AFRICAN AMERICAN: 54 ML/MIN
GFR SERPL CREATININE-BSD FRML MDRD: ABNORMAL ML/MIN/{1.73_M2}
GFR SERPL CREATININE-BSD FRML MDRD: ABNORMAL ML/MIN/{1.73_M2}
GLUCOSE BLD-MCNC: 123 MG/DL (ref 65–105)
GLUCOSE BLD-MCNC: 129 MG/DL (ref 70–99)
GLUCOSE BLD-MCNC: 201 MG/DL (ref 65–105)
HCT VFR BLD CALC: 25.7 % (ref 36–46)
HEMOGLOBIN: 8.5 G/DL (ref 12–16)
IMMATURE GRANULOCYTES: ABNORMAL %
LYMPHOCYTES # BLD: 30 % (ref 24–44)
MCH RBC QN AUTO: 33.3 PG (ref 26–34)
MCHC RBC AUTO-ENTMCNC: 33.1 G/DL (ref 31–37)
MCV RBC AUTO: 100.5 FL (ref 80–100)
MONOCYTES # BLD: 11 % (ref 1–7)
NRBC AUTOMATED: ABNORMAL PER 100 WBC
PDW BLD-RTO: 13.4 % (ref 11.5–14.9)
PLATELET # BLD: 219 K/UL (ref 150–450)
PLATELET ESTIMATE: ABNORMAL
PMV BLD AUTO: 9 FL (ref 6–12)
POTASSIUM SERPL-SCNC: 4.2 MMOL/L (ref 3.7–5.3)
RBC # BLD: 2.56 M/UL (ref 4–5.2)
RBC # BLD: ABNORMAL 10*6/UL
SEG NEUTROPHILS: 57 % (ref 36–66)
SEGMENTED NEUTROPHILS ABSOLUTE COUNT: 6.1 K/UL (ref 1.3–9.1)
SODIUM BLD-SCNC: 140 MMOL/L (ref 135–144)
SURGICAL PATHOLOGY REPORT: NORMAL
WBC # BLD: 10.9 K/UL (ref 3.5–11)
WBC # BLD: ABNORMAL 10*3/UL

## 2019-08-07 PROCEDURE — 6370000000 HC RX 637 (ALT 250 FOR IP): Performed by: UROLOGY

## 2019-08-07 PROCEDURE — 80048 BASIC METABOLIC PNL TOTAL CA: CPT

## 2019-08-07 PROCEDURE — 36415 COLL VENOUS BLD VENIPUNCTURE: CPT

## 2019-08-07 PROCEDURE — 97116 GAIT TRAINING THERAPY: CPT

## 2019-08-07 PROCEDURE — 2580000003 HC RX 258: Performed by: UROLOGY

## 2019-08-07 PROCEDURE — 6370000000 HC RX 637 (ALT 250 FOR IP): Performed by: FAMILY MEDICINE

## 2019-08-07 PROCEDURE — 99239 HOSP IP/OBS DSCHRG MGMT >30: CPT | Performed by: FAMILY MEDICINE

## 2019-08-07 PROCEDURE — 85025 COMPLETE CBC W/AUTO DIFF WBC: CPT

## 2019-08-07 PROCEDURE — 82947 ASSAY GLUCOSE BLOOD QUANT: CPT

## 2019-08-07 PROCEDURE — 97110 THERAPEUTIC EXERCISES: CPT

## 2019-08-07 RX ORDER — CIPROFLOXACIN 500 MG/1
500 TABLET, FILM COATED ORAL EVERY 12 HOURS SCHEDULED
Qty: 20 TABLET | Refills: 0
Start: 2019-08-07 | End: 2019-08-17

## 2019-08-07 RX ORDER — OXYBUTYNIN CHLORIDE 10 MG/1
10 TABLET, EXTENDED RELEASE ORAL 2 TIMES DAILY
Qty: 30 TABLET | Refills: 3
Start: 2019-08-07 | End: 2020-11-05

## 2019-08-07 RX ORDER — TROSPIUM CHLORIDE 20 MG/1
20 TABLET, FILM COATED ORAL
Qty: 60 TABLET | Refills: 3
Start: 2019-08-07 | End: 2020-11-05

## 2019-08-07 RX ORDER — PHENYTOIN SODIUM 100 MG/1
100 CAPSULE, EXTENDED RELEASE ORAL 2 TIMES DAILY
Qty: 60 CAPSULE | Refills: 3
Start: 2019-08-07 | End: 2020-03-09

## 2019-08-07 RX ADMIN — POTASSIUM CHLORIDE 20 MEQ: 20 TABLET, EXTENDED RELEASE ORAL at 07:32

## 2019-08-07 RX ADMIN — INSULIN HUMAN 27 UNITS: 100 INJECTION, SUSPENSION SUBCUTANEOUS at 08:49

## 2019-08-07 RX ADMIN — PANTOPRAZOLE SODIUM 40 MG: 40 TABLET, DELAYED RELEASE ORAL at 07:32

## 2019-08-07 RX ADMIN — CIPROFLOXACIN 500 MG: 500 TABLET, FILM COATED ORAL at 07:32

## 2019-08-07 RX ADMIN — AMLODIPINE BESYLATE 10 MG: 10 TABLET ORAL at 07:32

## 2019-08-07 RX ADMIN — ATORVASTATIN CALCIUM 40 MG: 40 TABLET, FILM COATED ORAL at 07:32

## 2019-08-07 RX ADMIN — OXYBUTYNIN CHLORIDE 10 MG: 10 TABLET, EXTENDED RELEASE ORAL at 07:31

## 2019-08-07 RX ADMIN — Medication 10 ML: at 07:32

## 2019-08-07 RX ADMIN — METFORMIN HYDROCHLORIDE 500 MG: 500 TABLET, EXTENDED RELEASE ORAL at 07:32

## 2019-08-07 RX ADMIN — FUROSEMIDE 40 MG: 40 TABLET ORAL at 07:32

## 2019-08-07 RX ADMIN — LOSARTAN POTASSIUM 100 MG: 100 TABLET ORAL at 07:32

## 2019-08-07 RX ADMIN — PHENYTOIN SODIUM 100 MG: 100 CAPSULE ORAL at 07:32

## 2019-08-07 RX ADMIN — METOPROLOL TARTRATE 75 MG: 25 TABLET ORAL at 07:32

## 2019-08-07 RX ADMIN — LEVOTHYROXINE SODIUM 137 MCG: 137 TABLET ORAL at 07:32

## 2019-08-07 ASSESSMENT — PAIN SCALES - GENERAL: PAINLEVEL_OUTOF10: 3

## 2019-08-07 ASSESSMENT — PAIN DESCRIPTION - ORIENTATION: ORIENTATION: RIGHT

## 2019-08-07 ASSESSMENT — PAIN DESCRIPTION - PAIN TYPE: TYPE: CHRONIC PAIN

## 2019-08-07 ASSESSMENT — PAIN DESCRIPTION - LOCATION: LOCATION: KNEE

## 2019-08-07 NOTE — PROGRESS NOTES
Progress Note  8/7/2019 10:03 AM  Subjective:   Admit Date: 8/1/2019  PCP: Edison Ely MD  Interval History: Pt doing okay . No new issues. Ready to go to [facility] for skilled care. Diet: DIET CARB CONTROL; Carb Control: 4 carb choices (60 gms)/meal  Medications:   Scheduled Meds:   ciprofloxacin  500 mg Oral 2 times per day    metFORMIN  500 mg Oral Daily with breakfast    hydrALAZINE  50 mg Oral 3 times per day    oxybutynin  10 mg Oral BID    trospium  20 mg Oral BID AC    sodium chloride flush  10 mL Intravenous 2 times per day    amLODIPine  10 mg Oral Daily    atorvastatin  40 mg Oral Daily    furosemide  40 mg Oral Daily    insulin NPH  27 Units Subcutaneous QAM    levothyroxine  137 mcg Oral Daily    losartan  100 mg Oral Daily    metoprolol tartrate  75 mg Oral BID    nitroGLYCERIN  1 patch Transdermal Daily    pantoprazole  40 mg Oral Daily    phenytoin  100 mg Oral BID    potassium chloride  20 mEq Oral Daily     Continuous Infusions:   dextrose       CBC:   Recent Labs     08/05/19  0605 08/06/19  0629 08/07/19  0628   WBC 12.9* 12.6* 10.9   HGB 10.5* 8.6* 8.5*    221 219     BMP:    Recent Labs     08/05/19  0605 08/06/19  0629 08/07/19  0628    139 140   K 4.1 4.1 4.2    105 105   CO2 27 25 24   BUN 18 20 21   CREATININE 0.80 0.86 0.98*   GLUCOSE 127* 165* 129*     Hepatic: No results for input(s): AST, ALT, ALB, BILITOT, ALKPHOS in the last 72 hours. Troponin: No results for input(s): TROPONINI in the last 72 hours. BNP: No results for input(s): BNP in the last 72 hours. Lipids: No results for input(s): CHOL, HDL in the last 72 hours. Invalid input(s): LDLCALCU  INR: No results for input(s): INR in the last 72 hours.     Objective:   Vitals: BP (!) 162/42   Pulse 65   Temp 99.3 °F (37.4 °C) (Oral)   Resp 16   Ht 5' 4\" (1.626 m)   Wt 162 lb 11.2 oz (73.8 kg)   SpO2 94%   BMI 27.93 kg/m²   General appearance: alert and cooperative with exam  Neck:

## 2019-08-07 NOTE — PLAN OF CARE
Problem: Falls - Risk of:  Goal: Will remain free from falls  Description  Will remain free from falls  8/7/2019 0116 by Matt Mojica RN  Outcome: Ongoing     Problem: Skin Integrity:  Goal: Skin integrity will stabilize  Description  Skin integrity will stabilize  8/7/2019 0116 by Matt Mojica RN  Outcome: Ongoing     Problem: Musculor/Skeletal Functional Status  Goal: Highest potential functional level  8/7/2019 0116 by Matt Mojica RN  Outcome: Ongoing

## 2019-08-12 ENCOUNTER — TELEPHONE (OUTPATIENT)
Dept: PRIMARY CARE CLINIC | Age: 84
End: 2019-08-12

## 2019-08-22 RX ORDER — PANTOPRAZOLE SODIUM 40 MG/1
TABLET, DELAYED RELEASE ORAL
Qty: 90 TABLET | Refills: 1 | Status: SHIPPED | OUTPATIENT
Start: 2019-08-22 | End: 2020-02-21

## 2019-08-26 ENCOUNTER — TELEPHONE (OUTPATIENT)
Dept: PRIMARY CARE CLINIC | Age: 84
End: 2019-08-26

## 2019-08-26 DIAGNOSIS — I10 ESSENTIAL HYPERTENSION: Primary | ICD-10-CM

## 2019-08-29 ENCOUNTER — OFFICE VISIT (OUTPATIENT)
Dept: UROLOGY | Age: 84
End: 2019-08-29
Payer: MEDICARE

## 2019-08-29 VITALS
DIASTOLIC BLOOD PRESSURE: 63 MMHG | HEART RATE: 56 BPM | WEIGHT: 151.6 LBS | SYSTOLIC BLOOD PRESSURE: 162 MMHG | HEIGHT: 64 IN | TEMPERATURE: 97.6 F | BODY MASS INDEX: 25.88 KG/M2

## 2019-08-29 DIAGNOSIS — R31.0 GROSS HEMATURIA: Primary | ICD-10-CM

## 2019-08-29 DIAGNOSIS — R33.9 INCOMPLETE BLADDER EMPTYING: ICD-10-CM

## 2019-08-29 DIAGNOSIS — N20.0 KIDNEY STONES: ICD-10-CM

## 2019-08-29 PROCEDURE — G8598 ASA/ANTIPLAT THER USED: HCPCS | Performed by: UROLOGY

## 2019-08-29 PROCEDURE — G8400 PT W/DXA NO RESULTS DOC: HCPCS | Performed by: UROLOGY

## 2019-08-29 PROCEDURE — 1111F DSCHRG MED/CURRENT MED MERGE: CPT | Performed by: UROLOGY

## 2019-08-29 PROCEDURE — 99214 OFFICE O/P EST MOD 30 MIN: CPT | Performed by: UROLOGY

## 2019-08-29 PROCEDURE — 1036F TOBACCO NON-USER: CPT | Performed by: UROLOGY

## 2019-08-29 PROCEDURE — 1123F ACP DISCUSS/DSCN MKR DOCD: CPT | Performed by: UROLOGY

## 2019-08-29 PROCEDURE — G8417 CALC BMI ABV UP PARAM F/U: HCPCS | Performed by: UROLOGY

## 2019-08-29 PROCEDURE — 1090F PRES/ABSN URINE INCON ASSESS: CPT | Performed by: UROLOGY

## 2019-08-29 PROCEDURE — G8428 CUR MEDS NOT DOCUMENT: HCPCS | Performed by: UROLOGY

## 2019-08-29 PROCEDURE — 4040F PNEUMOC VAC/ADMIN/RCVD: CPT | Performed by: UROLOGY

## 2019-08-29 ASSESSMENT — ENCOUNTER SYMPTOMS
COUGH: 0
VOMITING: 0
WHEEZING: 0
CONSTIPATION: 0
BACK PAIN: 0
DIARRHEA: 0
NAUSEA: 0
EYE REDNESS: 0
SHORTNESS OF BREATH: 0
EYE PAIN: 0
ABDOMINAL PAIN: 0

## 2019-08-29 NOTE — PROGRESS NOTES
Constipation 30 capsule 1    FREESTYLE LANCETS MISC Three times daily. E11.9 300 each 1    Nitroglycerin 400 MCG/SPRAY AERS PLACE 1 SPRAY UNDER THE TONGUE EVERY 5 MINUTES AS NEEDED (CHEST PAIN) FOR UP TO 3 DOSES 1 Bottle 2    mometasone (ELOCON) 0.1 % cream Apply topically 2 times daily as needed Apply topically daily.  acetaminophen (TYLENOL) 500 MG tablet Take 2 tablets by mouth every 6 hours as needed. 120 tablet 3    aspirin 325 MG tablet Take 325 mg by mouth daily. (All medications reviewed and updated by provider sincelast office visit or hospitalization)   Patient has no known allergies. Social History     Tobacco Use   Smoking Status Never Smoker   Smokeless Tobacco Never Used      (If patient a smoker, smoking cessation counseling offered)     Social History     Substance and Sexual Activity   Alcohol Use No       REVIEW OF SYSTEMS:  Review of Systems      Physical Exam:      Vitals:    08/29/19 1624   BP: (!) 162/63   Pulse: 56   Temp: 97.6 °F (36.4 °C)     Body mass index is 26.01 kg/m². Patient is a 80 y.o. female in noacute distress and alert and oriented to person, place and time. Physical Exam  Constitutional: Patient in no acute distress. Neuro: Alert andoriented to person, place and time. Psych: Mood normal, affect normal  Skin: No rash noted  Lungs: Respiratory effort is normal  Cardiovascular: Warm & Pink  Abdomen: Soft, non-tender, non-distended with no CVA,  No flank tenderness,  Or hepatosplenomegaly   Lymphatics: No palpable lymphadenopathy. Bladder non-tender and not distended. Musculoskeletal: Normalgait and station      Assessment and Plan      1. Gross hematuria    2. Incomplete bladder emptying    3. Kidney stones           Plan:         Doing well  Hematuria has resolved. Continue monthly catch changes. F/U in 1 year with Dr Darryl Lock. Return in about 1 year (around 8/29/2020).     Prescriptions Ordered:  No orders of the defined types were placed in this encounter. Orders Placed:  Orders Placed This Encounter   Procedures    XR ABDOMEN (KUB) (SINGLE AP VIEW)     Standing Status:   Future     Standing Expiration Date:   8/29/2020     Order Specific Question:   Reason for exam:     Answer:   kidney stones            Brenda Martel MD    Agree with the ROS entered by the MA.

## 2019-08-30 ENCOUNTER — OFFICE VISIT (OUTPATIENT)
Dept: PRIMARY CARE CLINIC | Age: 84
End: 2019-08-30
Payer: MEDICARE

## 2019-08-30 VITALS
SYSTOLIC BLOOD PRESSURE: 130 MMHG | DIASTOLIC BLOOD PRESSURE: 48 MMHG | HEART RATE: 56 BPM | BODY MASS INDEX: 26.11 KG/M2 | WEIGHT: 152.2 LBS | OXYGEN SATURATION: 97 %

## 2019-08-30 DIAGNOSIS — Z93.59 SUPRAPUBIC CATHETER (HCC): ICD-10-CM

## 2019-08-30 DIAGNOSIS — M17.11 ARTHRITIS OF RIGHT KNEE: ICD-10-CM

## 2019-08-30 DIAGNOSIS — Z79.4 TYPE 2 DIABETES MELLITUS WITHOUT COMPLICATION, WITH LONG-TERM CURRENT USE OF INSULIN (HCC): ICD-10-CM

## 2019-08-30 DIAGNOSIS — E11.9 TYPE 2 DIABETES MELLITUS WITHOUT COMPLICATION, WITH LONG-TERM CURRENT USE OF INSULIN (HCC): ICD-10-CM

## 2019-08-30 DIAGNOSIS — N30.00 ACUTE CYSTITIS WITHOUT HEMATURIA: Primary | ICD-10-CM

## 2019-08-30 LAB — HBA1C MFR BLD: 6.7 %

## 2019-08-30 PROCEDURE — 1111F DSCHRG MED/CURRENT MED MERGE: CPT | Performed by: FAMILY MEDICINE

## 2019-08-30 PROCEDURE — 99214 OFFICE O/P EST MOD 30 MIN: CPT | Performed by: FAMILY MEDICINE

## 2019-08-30 PROCEDURE — 83036 HEMOGLOBIN GLYCOSYLATED A1C: CPT | Performed by: FAMILY MEDICINE

## 2019-08-30 ASSESSMENT — ENCOUNTER SYMPTOMS
GASTROINTESTINAL NEGATIVE: 1
RESPIRATORY NEGATIVE: 1

## 2019-08-30 NOTE — PROGRESS NOTES
ER capsule  Take 1 capsule by mouth 2 times daily             potassium chloride (KLOR-CON M) 20 MEQ extended release tablet  TAKE 1 TABLET DAILY             trospium (SANCTURA) 20 MG tablet  Take 1 tablet by mouth 2 times daily (before meals)                   Medications marked \"taking\" at this time  Outpatient Medications Marked as Taking for the 8/30/19 encounter (Office Visit) with Susan Jackson MD   Medication Sig Dispense Refill    diclofenac sodium (VOLTAREN) 1 % GEL Apply 2 g topically 4 times daily as needed for Pain For upper extremity, use 2 gm QID. For lower extremity, use 4 gm QID.  100 g 11    metoprolol tartrate (LOPRESSOR) 25 MG tablet Take 3 tablets by mouth 2 times daily 180 tablet 1    pantoprazole (PROTONIX) 40 MG tablet TAKE 1 TABLET DAILY 90 tablet 1    phenytoin (DILANTIN) 100 MG ER capsule Take 1 capsule by mouth 2 times daily 60 capsule 3    oxybutynin (DITROPAN-XL) 10 MG extended release tablet Take 1 tablet by mouth 2 times daily 30 tablet 3    trospium (SANCTURA) 20 MG tablet Take 1 tablet by mouth 2 times daily (before meals) 60 tablet 3    levothyroxine (SYNTHROID) 137 MCG tablet TAKE 1 TABLET DAILY 90 tablet 3    losartan (COZAAR) 100 MG tablet TAKE 1 TABLET DAILY 90 tablet 3    nitroGLYCERIN (NITRODUR) 0.2 MG/HR APPLY 1 PATCH ONTO THE SKIN DAILY, 12 HOURS ON THEN 12 HOURS OFF 90 patch 2    hydrALAZINE (APRESOLINE) 50 MG tablet TAKE 1 TABLET EVERY 8 HOURS 270 tablet 3    amLODIPine (NORVASC) 10 MG tablet TAKE 1 TABLET DAILY 30 tablet 3    metFORMIN (GLUCOPHAGE-XR) 500 MG extended release tablet TAKE 1 TABLET DAILY WITH SUPPER 90 tablet 2    furosemide (LASIX) 40 MG tablet TAKE 1 TABLET DAILY 90 tablet 2    potassium chloride (KLOR-CON M) 20 MEQ extended release tablet TAKE 1 TABLET DAILY 90 tablet 2    atorvastatin (LIPITOR) 40 MG tablet TAKE 1 TABLET DAILY 90 tablet 3    insulin NPH (HUMULIN N;NOVOLIN N) 100 UNIT/ML injection vial Inject 27 Units into the skin every morning Can go up to 30 units if neede 2 vial 3    docusate sodium (COLACE) 100 MG capsule Take 1 capsule by mouth 2 times daily as needed for Constipation 30 capsule 1    Nitroglycerin 400 MCG/SPRAY AERS PLACE 1 SPRAY UNDER THE TONGUE EVERY 5 MINUTES AS NEEDED (CHEST PAIN) FOR UP TO 3 DOSES 1 Bottle 2    mometasone (ELOCON) 0.1 % cream Apply topically 2 times daily as needed Apply topically daily.  acetaminophen (TYLENOL) 500 MG tablet Take 2 tablets by mouth every 6 hours as needed. 120 tablet 3    aspirin 325 MG tablet Take 325 mg by mouth daily. Medications patient taking as of now reconciled against medications ordered at time of hospital discharge: Yes    Chief Complaint   Patient presents with    Follow-up     [place]       HPI    Inpatient course: Discharge summary reviewed- see chart. Interval history/Current status: feeling better  Had 2 bacteria in the urine culture. Had severe hematuria. Has suprapubic cath. Had urology eval.   Was in SNF for a few days for strengthening. Review of Systems   Constitutional: Negative. Respiratory: Negative. Cardiovascular: Negative. Gastrointestinal: Negative. feels well. No more bleeding. Right knee pain     Vitals:    08/30/19 1042   BP: (!) 130/48   Pulse: 56   SpO2: 97%   Weight: 152 lb 3.2 oz (69 kg)     Body mass index is 26.11 kg/m². Wt Readings from Last 3 Encounters:   08/30/19 152 lb 3.2 oz (69 kg)   08/29/19 151 lb 9.6 oz (68.8 kg)   08/05/19 162 lb 11.2 oz (73.8 kg)     BP Readings from Last 3 Encounters:   08/30/19 (!) 130/48   08/29/19 (!) 162/63   08/07/19 (!) 162/42       Physical Exam   Constitutional: She is oriented to person, place, and time. She appears well-developed and well-nourished. No distress. HENT:   Head: Normocephalic and atraumatic.    Right Ear: External ear normal.   Left Ear: External ear normal.   Mouth/Throat: Oropharynx is clear and moist.   Eyes: Pupils are equal, round, and

## 2019-09-10 ENCOUNTER — TELEPHONE (OUTPATIENT)
Dept: UROLOGY | Age: 84
End: 2019-09-10

## 2019-09-24 ENCOUNTER — OFFICE VISIT (OUTPATIENT)
Dept: UROLOGY | Age: 84
End: 2019-09-24
Payer: MEDICARE

## 2019-09-24 ENCOUNTER — TELEPHONE (OUTPATIENT)
Dept: UROLOGY | Age: 84
End: 2019-09-24

## 2019-09-24 VITALS
HEIGHT: 64 IN | WEIGHT: 152.12 LBS | DIASTOLIC BLOOD PRESSURE: 57 MMHG | HEART RATE: 73 BPM | SYSTOLIC BLOOD PRESSURE: 138 MMHG | TEMPERATURE: 98.3 F | BODY MASS INDEX: 25.97 KG/M2

## 2019-09-24 DIAGNOSIS — N39.0 RECURRENT UTI: ICD-10-CM

## 2019-09-24 DIAGNOSIS — R31.0 GROSS HEMATURIA: Primary | ICD-10-CM

## 2019-09-24 DIAGNOSIS — R33.9 INCOMPLETE BLADDER EMPTYING: ICD-10-CM

## 2019-09-24 PROCEDURE — G8400 PT W/DXA NO RESULTS DOC: HCPCS | Performed by: UROLOGY

## 2019-09-24 PROCEDURE — 1036F TOBACCO NON-USER: CPT | Performed by: UROLOGY

## 2019-09-24 PROCEDURE — G8417 CALC BMI ABV UP PARAM F/U: HCPCS | Performed by: UROLOGY

## 2019-09-24 PROCEDURE — 1090F PRES/ABSN URINE INCON ASSESS: CPT | Performed by: UROLOGY

## 2019-09-24 PROCEDURE — G8598 ASA/ANTIPLAT THER USED: HCPCS | Performed by: UROLOGY

## 2019-09-24 PROCEDURE — 4040F PNEUMOC VAC/ADMIN/RCVD: CPT | Performed by: UROLOGY

## 2019-09-24 PROCEDURE — G8427 DOCREV CUR MEDS BY ELIG CLIN: HCPCS | Performed by: UROLOGY

## 2019-09-24 PROCEDURE — 1123F ACP DISCUSS/DSCN MKR DOCD: CPT | Performed by: UROLOGY

## 2019-09-24 PROCEDURE — 99214 OFFICE O/P EST MOD 30 MIN: CPT | Performed by: UROLOGY

## 2019-09-24 RX ORDER — LEVOFLOXACIN 500 MG/1
500 TABLET, FILM COATED ORAL DAILY
COMMUNITY
End: 2019-10-10

## 2019-09-24 ASSESSMENT — ENCOUNTER SYMPTOMS
VOMITING: 0
COUGH: 0
BACK PAIN: 0
SHORTNESS OF BREATH: 0
EYE PAIN: 0
WHEEZING: 0
EYE REDNESS: 0
NAUSEA: 0
COLOR CHANGE: 0
ABDOMINAL PAIN: 0

## 2019-09-24 NOTE — LETTER
[facility]  [facility] - [address]  Dept: [phone]  Dept Fax: [phone]        9/24/19    Patient: Vicenta Mcdonald  YOB: 1934    Dear Susan Jackson MD,    I had the pleasure of seeing one of your patients, Zuhair Sandoval today in the office today. Below are the relevant portions of my assessment and plan of care. IMPRESSION:  1. Gross hematuria    2. Recurrent UTI    3. Incomplete bladder emptying        PLAN:  Hematuria has resolved. Finish Levaquin. Full hematuria work up was negative in August.   Recent bleeding related to UTI and SP tube. F/U in 6 months. Return in about 6 months (around 3/24/2020). Prescriptions Ordered:  No orders of the defined types were placed in this encounter. Orders Placed:  No orders of the defined types were placed in this encounter. Thank you for allowing me to participate in the care of this patient. I will keep you updated on this patient's follow up and I look forward to serving you and your patients again in the future.         Carl Block MD

## 2019-09-24 NOTE — PROGRESS NOTES
from general anesthesia     S/P CABG x 3 1996    Seizure disorder ([address] 10/23/2015    From stroke, on Dilantin. PATIENT STATES SEIZURE WAS IN 2008. ONLY HAD 1 SEIZURE.     Thyroid disease     Type II or unspecified type diabetes mellitus without mention of complication, not stated as uncontrolled     on Insulin & Metformin    Unspecified cerebral artery occlusion with cerebral infarction 1998    Slight Lt Arm and Leg Residual Numbess    Uses roller walker     Wears glasses      Past Surgical History:   Procedure Laterality Date    CARDIAC SURGERY  1996    CABG X 3 BYPASS    CAROTID ENDARTERECTOMY Right     CHOLECYSTECTOMY      COLONOSCOPY  about 2 years ago   [address]    3 vls    CYSTOSCOPY  08/31/2018    CYSTO, SP TUBE PLACEMENT    CYSTOSCOPY W BIOPSY OF BLADDER N/A 8/5/2019    CYSTOSCOPY, BLADDER BIOPSY AND FULGURATION performed by Jorden Torres MD at [address] [facility] Bilateral 2017    CATARACTS WITH IOL PLACED    HYSTERECTOMY      [facility] CYSTOURETHROSCOPY N/A 7/27/2018    [facility] N/A 7/27/2018    VIDEO  URODYNAMICS performed by Bridget Bryan MD at [address] OFFICE/OUTPT [facility] N/A 8/31/2018    CYSTO, SP TUBE PLACEMENT performed by Bridget Bryan MD at [facility] [address] Left 8/22/2017    EYE CATARACT EMULSIFICATION IOL IMPLANT performed by Jason Swain MD at [facility] [address] Right 9/5/2017    EYE CATARACT EMULSIFICATION IOL IMPLANT performed by Jason Swain MD at [facility]       Family History   Problem Relation Age of Onset    Diabetes Father     Heart Attack Father     Heart Disease Father     Heart Attack Sister     High Blood Pressure Mother     Heart Attack Brother     No Known Problems Maternal Grandmother     No Known Problems Maternal Grandfather     No Known

## 2019-09-27 ENCOUNTER — APPOINTMENT (OUTPATIENT)
Dept: GENERAL RADIOLOGY | Age: 84
End: 2019-09-27
Payer: MEDICARE

## 2019-09-27 ENCOUNTER — HOSPITAL ENCOUNTER (EMERGENCY)
Age: 84
Discharge: HOME | End: 2019-09-27
Attending: EMERGENCY MEDICINE
Payer: MEDICARE

## 2019-09-27 VITALS
WEIGHT: 153 LBS | TEMPERATURE: 98.6 F | SYSTOLIC BLOOD PRESSURE: 178 MMHG | HEIGHT: 64 IN | DIASTOLIC BLOOD PRESSURE: 54 MMHG | RESPIRATION RATE: 18 BRPM | OXYGEN SATURATION: 97 % | HEART RATE: 62 BPM | BODY MASS INDEX: 26.12 KG/M2

## 2019-09-27 DIAGNOSIS — J02.9 ACUTE PHARYNGITIS, UNSPECIFIED ETIOLOGY: ICD-10-CM

## 2019-09-27 DIAGNOSIS — R53.83 FATIGUE, UNSPECIFIED TYPE: Primary | ICD-10-CM

## 2019-09-27 DIAGNOSIS — R53.1 GENERALIZED WEAKNESS: ICD-10-CM

## 2019-09-27 LAB
-: ABNORMAL
ABSOLUTE EOS #: 0.1 K/UL (ref 0–0.4)
ABSOLUTE IMMATURE GRANULOCYTE: ABNORMAL K/UL (ref 0–0.3)
ABSOLUTE LYMPH #: 2.8 K/UL (ref 1–4.8)
ABSOLUTE MONO #: 0.9 K/UL (ref 0.1–1.3)
AMORPHOUS: ABNORMAL
ANION GAP SERPL CALCULATED.3IONS-SCNC: 13 MMOL/L (ref 9–17)
BACTERIA: ABNORMAL
BASOPHILS # BLD: 1 % (ref 0–2)
BASOPHILS ABSOLUTE: 0.1 K/UL (ref 0–0.2)
BILIRUBIN URINE: NEGATIVE
BUN BLDV-MCNC: 16 MG/DL (ref 8–23)
BUN/CREAT BLD: ABNORMAL (ref 9–20)
CALCIUM SERPL-MCNC: 8.8 MG/DL (ref 8.6–10.4)
CASTS UA: ABNORMAL /LPF
CHLORIDE BLD-SCNC: 104 MMOL/L (ref 98–107)
CO2: 25 MMOL/L (ref 20–31)
COLOR: YELLOW
COMMENT UA: ABNORMAL
CREAT SERPL-MCNC: 1.01 MG/DL (ref 0.5–0.9)
CRYSTALS, UA: ABNORMAL /HPF
DIFFERENTIAL TYPE: ABNORMAL
DIRECT EXAM: NORMAL
EOSINOPHILS RELATIVE PERCENT: 2 % (ref 0–4)
EPITHELIAL CELLS UA: ABNORMAL /HPF
GFR AFRICAN AMERICAN: >60 ML/MIN
GFR NON-AFRICAN AMERICAN: 52 ML/MIN
GFR SERPL CREATININE-BSD FRML MDRD: ABNORMAL ML/MIN/{1.73_M2}
GFR SERPL CREATININE-BSD FRML MDRD: ABNORMAL ML/MIN/{1.73_M2}
GLUCOSE BLD-MCNC: 206 MG/DL (ref 70–99)
GLUCOSE URINE: NEGATIVE
HCT VFR BLD CALC: 31 % (ref 36–46)
HEMOGLOBIN: 10.5 G/DL (ref 12–16)
IMMATURE GRANULOCYTES: ABNORMAL %
KETONES, URINE: NEGATIVE
LEUKOCYTE ESTERASE, URINE: ABNORMAL
LYMPHOCYTES # BLD: 35 % (ref 24–44)
Lab: NORMAL
MCH RBC QN AUTO: 33.4 PG (ref 26–34)
MCHC RBC AUTO-ENTMCNC: 33.9 G/DL (ref 31–37)
MCV RBC AUTO: 98.5 FL (ref 80–100)
MONOCYTES # BLD: 12 % (ref 1–7)
MUCUS: ABNORMAL
NITRITE, URINE: NEGATIVE
NRBC AUTOMATED: ABNORMAL PER 100 WBC
OTHER OBSERVATIONS UA: ABNORMAL
PDW BLD-RTO: 12.9 % (ref 11.5–14.9)
PH UA: 7.5 (ref 5–8)
PLATELET # BLD: 252 K/UL (ref 150–450)
PLATELET ESTIMATE: ABNORMAL
PMV BLD AUTO: 8 FL (ref 6–12)
POTASSIUM SERPL-SCNC: 3.8 MMOL/L (ref 3.7–5.3)
PROTEIN UA: NEGATIVE
RBC # BLD: 3.14 M/UL (ref 4–5.2)
RBC # BLD: ABNORMAL 10*6/UL
RBC UA: ABNORMAL /HPF
RENAL EPITHELIAL, UA: ABNORMAL /HPF
SEG NEUTROPHILS: 50 % (ref 36–66)
SEGMENTED NEUTROPHILS ABSOLUTE COUNT: 4 K/UL (ref 1.3–9.1)
SODIUM BLD-SCNC: 142 MMOL/L (ref 135–144)
SPECIFIC GRAVITY UA: 1 (ref 1–1.03)
SPECIMEN DESCRIPTION: NORMAL
TRICHOMONAS: ABNORMAL
TROPONIN INTERP: ABNORMAL
TROPONIN INTERP: ABNORMAL
TROPONIN T: ABNORMAL NG/ML
TROPONIN T: ABNORMAL NG/ML
TROPONIN, HIGH SENSITIVITY: 17 NG/L (ref 0–14)
TROPONIN, HIGH SENSITIVITY: 18 NG/L (ref 0–14)
TURBIDITY: CLEAR
URINE HGB: NEGATIVE
UROBILINOGEN, URINE: NORMAL
WBC # BLD: 7.9 K/UL (ref 3.5–11)
WBC # BLD: ABNORMAL 10*3/UL
WBC UA: ABNORMAL /HPF
YEAST: ABNORMAL

## 2019-09-27 PROCEDURE — 85025 COMPLETE CBC W/AUTO DIFF WBC: CPT

## 2019-09-27 PROCEDURE — 80048 BASIC METABOLIC PNL TOTAL CA: CPT

## 2019-09-27 PROCEDURE — 96360 HYDRATION IV INFUSION INIT: CPT

## 2019-09-27 PROCEDURE — 2580000003 HC RX 258: Performed by: STUDENT IN AN ORGANIZED HEALTH CARE EDUCATION/TRAINING PROGRAM

## 2019-09-27 PROCEDURE — 84484 ASSAY OF TROPONIN QUANT: CPT

## 2019-09-27 PROCEDURE — 93005 ELECTROCARDIOGRAM TRACING: CPT | Performed by: STUDENT IN AN ORGANIZED HEALTH CARE EDUCATION/TRAINING PROGRAM

## 2019-09-27 PROCEDURE — 71046 X-RAY EXAM CHEST 2 VIEWS: CPT

## 2019-09-27 PROCEDURE — 81001 URINALYSIS AUTO W/SCOPE: CPT

## 2019-09-27 PROCEDURE — 87086 URINE CULTURE/COLONY COUNT: CPT

## 2019-09-27 PROCEDURE — 99285 EMERGENCY DEPT VISIT HI MDM: CPT

## 2019-09-27 PROCEDURE — 36415 COLL VENOUS BLD VENIPUNCTURE: CPT

## 2019-09-27 PROCEDURE — 87804 INFLUENZA ASSAY W/OPTIC: CPT

## 2019-09-27 PROCEDURE — 96361 HYDRATE IV INFUSION ADD-ON: CPT

## 2019-09-27 RX ORDER — 0.9 % SODIUM CHLORIDE 0.9 %
1000 INTRAVENOUS SOLUTION INTRAVENOUS ONCE
Status: COMPLETED | OUTPATIENT
Start: 2019-09-27 | End: 2019-09-27

## 2019-09-27 RX ADMIN — SODIUM CHLORIDE 1000 ML: 9 INJECTION, SOLUTION INTRAVENOUS at 16:30

## 2019-09-27 ASSESSMENT — ENCOUNTER SYMPTOMS
VOMITING: 0
BACK PAIN: 0
COUGH: 1
SORE THROAT: 1
RHINORRHEA: 1
ABDOMINAL PAIN: 0
SHORTNESS OF BREATH: 0
NAUSEA: 0
DIARRHEA: 0

## 2019-09-27 NOTE — ED PROVIDER NOTES
products. Family History   Problem Relation Age of Onset    Diabetes Father     Heart Attack Father     Heart Disease Father     Heart Attack Sister     High Blood Pressure Mother     Heart Attack Brother     No Known Problems Maternal Grandmother     No Known Problems Maternal Grandfather     No Known Problems Paternal Grandmother     No Known Problems Paternal Grandfather     High Blood Pressure Daughter     Diabetes Daughter     No Known Problems Son        Allergies:  Patient has no known allergies. Home Medications:  Prior to Admission medications    Medication Sig Start Date End Date Taking? Authorizing Provider   levofloxacin (LEVAQUIN) 500 MG tablet Take 500 mg by mouth daily    Historical Provider, MD   diclofenac sodium (VOLTAREN) 1 % GEL Apply 2 g topically 4 times daily as needed for Pain For upper extremity, use 2 gm QID.   For lower extremity, use 4 gm QID. 8/30/19   Bernard Jung MD   metoprolol tartrate (LOPRESSOR) 25 MG tablet Take 3 tablets by mouth 2 times daily 8/26/19   Bernard Jung MD   pantoprazole (PROTONIX) 40 MG tablet TAKE 1 TABLET DAILY 8/22/19   Bernard Jung MD   phenytoin (DILANTIN) 100 MG ER capsule Take 1 capsule by mouth 2 times daily 8/7/19   Didi Jha MD   oxybutynin (DITROPAN-XL) 10 MG extended release tablet Take 1 tablet by mouth 2 times daily 8/7/19   Didi Jha MD   [facility]) 20 MG tablet Take 1 tablet by mouth 2 times daily (before meals) 8/7/19   Didi Jha MD   levothyroxine (SYNTHROID) 137 MCG tablet TAKE 1 TABLET DAILY 7/31/19   Bernard Jung MD   losartan (COZAAR) 100 MG tablet TAKE 1 TABLET DAILY 7/24/19   Bernard Jung MD   nitroGLYCERIN (NITRODUR) 0.2 MG/HR APPLY 1 PATCH ONTO THE SKIN DAILY, 12 HOURS ON THEN 12 HOURS OFF 5/3/19   Bernard Jung MD   hydrALAZINE (APRESOLINE) 50 MG tablet TAKE 1 TABLET EVERY 8 HOURS 2/12/19   Bernard Jung MD   amLODIPine (NORVASC) 10 MG tablet TAKE 1 TABLET DAILY 2/8/19   Bernard Jung MD   metFORMIN (GLUCOPHAGE-XR) 500 MG extended release tablet TAKE 1 TABLET DAILY WITH SUPPER 11/21/18   Bernard Jung MD   furosemide (LASIX) 40 MG tablet TAKE 1 TABLET DAILY 11/21/18   Bernard Jung MD   potassium chloride (KLOR-CON M) 20 MEQ extended release tablet TAKE 1 TABLET DAILY 11/21/18   Bernard uJng MD   atorvastatin (LIPITOR) 40 MG tablet TAKE 1 TABLET DAILY 11/21/18   Bernard Jung MD   insulin NPH (HUMULIN N;NOVOLIN N) 100 UNIT/ML injection vial Inject 27 Units into the skin every morning Can go up to 30 units if neede 10/9/18   Bernard Jung MD   docusate sodium (COLACE) 100 MG capsule Take 1 capsule by mouth 2 times daily as needed for Constipation 8/31/18   Valentino German, MD   Nitroglycerin 400 MCG/SPRAY AERS PLACE 1 SPRAY UNDER THE TONGUE EVERY 5 MINUTES AS NEEDED (CHEST PAIN) FOR UP TO 3 DOSES 1/25/18   Bernard Jung MD   mometasone (ELOCON) 0.1 % cream Apply topically 2 times daily as needed Apply topically daily. Historical Provider, MD   acetaminophen (TYLENOL) 500 MG tablet Take 2 tablets by mouth every 6 hours as needed. 12/3/14   Bernard Jung MD   aspirin 325 MG tablet Take 325 mg by mouth daily. Historical Provider, MD       REVIEW OF SYSTEMS    (2-9 systems for level 4, 10 ormore for level 5)      Review of Systems   Constitutional: Positive for fatigue. Negative for chills and fever. HENT: Positive for congestion, rhinorrhea and sore throat. Negative for ear discharge and ear pain. Eyes: Negative for visual disturbance. Respiratory: Positive for cough. Negative for shortness of breath. Cardiovascular: Negative for chest pain. Gastrointestinal: Negative for abdominal pain, diarrhea, nausea and vomiting. Genitourinary: Negative for dysuria and hematuria. Musculoskeletal: Negative for back pain and neck pain. Skin: Negative for rash.    Neurological: Negative for dizziness, light-headedness, numbness American 52 (L) >60 mL/min    GFR African American >60 >60 mL/min    GFR Comment          GFR Staging NOT REPORTED    Urinalysis with Microscopic   Result Value Ref Range    Color, UA YELLOW YELLOW    Turbidity UA CLEAR CLEAR    Glucose, Ur NEGATIVE NEGATIVE    Bilirubin Urine NEGATIVE NEGATIVE    Ketones, Urine NEGATIVE NEGATIVE    Specific Gravity, UA 1.005 1.000 - 1.030    Urine Hgb NEGATIVE NEGATIVE    pH, UA 7.5 5.0 - 8.0    Protein, UA NEGATIVE NEGATIVE    Urobilinogen, Urine Normal Normal    Nitrite, Urine NEGATIVE NEGATIVE    Leukocyte Esterase, Urine SMALL (A) NEGATIVE    Urinalysis Comments NOT REPORTED     -          WBC, UA 2 TO 5 /HPF    RBC, UA None /HPF    Casts UA NOT REPORTED /LPF    Crystals UA NOT REPORTED None /HPF    Epithelial Cells UA 0 TO 2 /HPF    Renal Epithelial, Urine NOT REPORTED 0 /HPF    Bacteria, UA NOT REPORTED None    Mucus, UA NOT REPORTED None    Trichomonas, UA NOT REPORTED None    Amorphous, UA NOT REPORTED None    Other Observations UA NOT REPORTED NOT REQ. Yeast, UA NOT REPORTED None   Troponin   Result Value Ref Range    Troponin, High Sensitivity 17 (H) 0 - 14 ng/L    Troponin T NOT REPORTED <0.03 ng/mL    Troponin Interp NOT REPORTED    Troponin   Result Value Ref Range    Troponin, High Sensitivity 18 (H) 0 - 14 ng/L    Troponin T NOT REPORTED <0.03 ng/mL    Troponin Interp NOT REPORTED    EKG 12 Lead   Result Value Ref Range    Ventricular Rate 66 BPM    Atrial Rate 66 BPM    P-R Interval 184 ms    QRS Duration 132 ms    Q-T Interval 446 ms    QTc Calculation (Bazett) 467 ms    P Axis 66 degrees    R Axis -23 degrees    T Axis 44 degrees         IMPRESSION:  80 y.o. female presented with fatigue, generalized weakness, cough, sore throat for the last few days. Patient denied any neuro symptoms, has been compliant with her medications, no fevers or chills. On exam patient appears well, no obvious deficits, vital signs within normal limits.   Chest pain work-up obtained and was unremarkable, no signs of UTI as patient recently completed Levaquin and has recurrent UTIs with supra catheter in place. No signs of bacterial infection, most likely viral URI. Patient advised to continue staying hydrated, take over-the-counter medications for symptomatic relief, and to make a follow-up appointment with her PCP. Patient requesting discharge. Patient was given written and verbal instructions prior to discharge. Patient understood and agreed. Patient had no further questions. RADIOLOGY:  Xr Chest Standard (2 Vw)    Result Date: 9/27/2019  EXAMINATION: TWO X-RAY VIEWS OF THE CHEST, 9/27/2019 4:38 pm COMPARISON: August 1, 2016. HISTORY: ORDERING SYSTEM PROVIDED HISTORY: Cough, weak TECHNOLOGIST PROVIDED HISTORY: Cough, weak Reason for Exam: Cough, weakness Acuity: Acute Type of Exam: Initial FINDINGS: Lungs are clear without edema or pneumothorax. Calcified right lung base nodule secondary to remote granulomatous disease. Stable heart size and mediastinal contours. No acute cardiopulmonary process. EKG  EKG Interpretation    Interpreted by emergency department physician    Rhythm: normal sinus   Rate: normal  Axis: left  Ectopy: none  Conduction: left bundle branch block (complete), wide QRS  ST Segments: elevation in  v2  T Waves: no acute change  Q Waves: none    Clinical Impression: no acute changes and left bundle branch block    Bubba Nichols      All EKG's are interpreted by the Emergency Department Physician who either signs or Co-signs this chart in the absence of a cardiologist.    EMERGENCY DEPARTMENT COURSE:       PROCEDURES:  None    CONSULTS:  None        FINAL IMPRESSION      1. Fatigue, unspecified type    2. Generalized weakness    3. Acute pharyngitis, unspecified etiology          DISPOSITION / PLAN     DISPOSITION  DISPOSITION Decision To Discharge 09/27/2019 07:36:17 PM        PATIENT REFERREDTO:  Darrion Pruitt MD  [address]   [address]

## 2019-09-28 LAB
CULTURE: NO GROWTH
Lab: NORMAL
SPECIMEN DESCRIPTION: NORMAL

## 2019-09-30 LAB
EKG ATRIAL RATE: 66 BPM
EKG P AXIS: 66 DEGREES
EKG P-R INTERVAL: 184 MS
EKG Q-T INTERVAL: 446 MS
EKG QRS DURATION: 132 MS
EKG QTC CALCULATION (BAZETT): 467 MS
EKG R AXIS: -23 DEGREES
EKG T AXIS: 44 DEGREES
EKG VENTRICULAR RATE: 66 BPM

## 2019-09-30 PROCEDURE — 93010 ELECTROCARDIOGRAM REPORT: CPT | Performed by: INTERNAL MEDICINE

## 2019-10-02 DIAGNOSIS — R19.7 DIARRHEA, UNSPECIFIED TYPE: ICD-10-CM

## 2019-10-02 DIAGNOSIS — I10 ESSENTIAL HYPERTENSION: ICD-10-CM

## 2019-10-03 ENCOUNTER — TELEPHONE (OUTPATIENT)
Dept: PRIMARY CARE CLINIC | Age: 84
End: 2019-10-03

## 2019-10-03 RX ORDER — DIPHENOXYLATE HYDROCHLORIDE AND ATROPINE SULFATE 2.5; .025 MG/1; MG/1
1 TABLET ORAL 4 TIMES DAILY PRN
Qty: 120 TABLET | Refills: 1 | Status: SHIPPED | OUTPATIENT
Start: 2019-10-03 | End: 2020-06-04 | Stop reason: SDUPTHER

## 2019-10-03 RX ORDER — LANCETS 28 GAUGE
EACH MISCELLANEOUS
Qty: 300 EACH | Refills: 1 | Status: SHIPPED | OUTPATIENT
Start: 2019-10-03 | End: 2019-10-04 | Stop reason: SDUPTHER

## 2019-10-04 DIAGNOSIS — Z79.4 TYPE 2 DIABETES MELLITUS WITHOUT COMPLICATION, WITH LONG-TERM CURRENT USE OF INSULIN (HCC): ICD-10-CM

## 2019-10-04 DIAGNOSIS — E11.9 TYPE 2 DIABETES MELLITUS WITHOUT COMPLICATION, WITH LONG-TERM CURRENT USE OF INSULIN (HCC): ICD-10-CM

## 2019-10-04 RX ORDER — LANCETS 28 GAUGE
EACH MISCELLANEOUS
Qty: 300 EACH | Refills: 1 | Status: SHIPPED | OUTPATIENT
Start: 2019-10-04 | End: 2021-04-15 | Stop reason: SDUPTHER

## 2019-10-10 ENCOUNTER — OFFICE VISIT (OUTPATIENT)
Dept: PRIMARY CARE CLINIC | Age: 84
End: 2019-10-10
Payer: MEDICARE

## 2019-10-10 VITALS
HEART RATE: 63 BPM | OXYGEN SATURATION: 97 % | SYSTOLIC BLOOD PRESSURE: 128 MMHG | DIASTOLIC BLOOD PRESSURE: 56 MMHG | BODY MASS INDEX: 25.37 KG/M2 | WEIGHT: 147.8 LBS

## 2019-10-10 DIAGNOSIS — Z23 NEEDS FLU SHOT: ICD-10-CM

## 2019-10-10 DIAGNOSIS — I10 ESSENTIAL HYPERTENSION: ICD-10-CM

## 2019-10-10 DIAGNOSIS — J06.9 VIRAL UPPER RESPIRATORY ILLNESS: Primary | ICD-10-CM

## 2019-10-10 PROCEDURE — 1123F ACP DISCUSS/DSCN MKR DOCD: CPT | Performed by: FAMILY MEDICINE

## 2019-10-10 PROCEDURE — G8400 PT W/DXA NO RESULTS DOC: HCPCS | Performed by: FAMILY MEDICINE

## 2019-10-10 PROCEDURE — G8482 FLU IMMUNIZE ORDER/ADMIN: HCPCS | Performed by: FAMILY MEDICINE

## 2019-10-10 PROCEDURE — 90653 IIV ADJUVANT VACCINE IM: CPT | Performed by: FAMILY MEDICINE

## 2019-10-10 PROCEDURE — 4040F PNEUMOC VAC/ADMIN/RCVD: CPT | Performed by: FAMILY MEDICINE

## 2019-10-10 PROCEDURE — G8417 CALC BMI ABV UP PARAM F/U: HCPCS | Performed by: FAMILY MEDICINE

## 2019-10-10 PROCEDURE — 1090F PRES/ABSN URINE INCON ASSESS: CPT | Performed by: FAMILY MEDICINE

## 2019-10-10 PROCEDURE — G8598 ASA/ANTIPLAT THER USED: HCPCS | Performed by: FAMILY MEDICINE

## 2019-10-10 PROCEDURE — 1036F TOBACCO NON-USER: CPT | Performed by: FAMILY MEDICINE

## 2019-10-10 PROCEDURE — G8427 DOCREV CUR MEDS BY ELIG CLIN: HCPCS | Performed by: FAMILY MEDICINE

## 2019-10-10 PROCEDURE — 99213 OFFICE O/P EST LOW 20 MIN: CPT | Performed by: FAMILY MEDICINE

## 2019-10-10 PROCEDURE — G0008 ADMIN INFLUENZA VIRUS VAC: HCPCS | Performed by: FAMILY MEDICINE

## 2019-10-10 RX ORDER — NITROGLYCERIN 400 UG/1
SPRAY ORAL
Refills: 2 | COMMUNITY
Start: 2019-10-04 | End: 2021-10-19

## 2019-10-10 RX ORDER — CLOPIDOGREL BISULFATE 75 MG/1
75 TABLET ORAL DAILY
COMMUNITY
Start: 2019-10-01 | End: 2020-03-09

## 2019-10-10 ASSESSMENT — ENCOUNTER SYMPTOMS
RESPIRATORY NEGATIVE: 1
SORE THROAT: 0
SHORTNESS OF BREATH: 0

## 2019-10-31 DIAGNOSIS — Z12.31 SCREENING MAMMOGRAM, ENCOUNTER FOR: Primary | ICD-10-CM

## 2019-11-01 DIAGNOSIS — I10 ESSENTIAL HYPERTENSION: ICD-10-CM

## 2019-11-25 ENCOUNTER — HOSPITAL ENCOUNTER (OUTPATIENT)
Dept: WOMENS IMAGING | Age: 84
Discharge: HOME | End: 2019-11-27
Payer: MEDICARE

## 2019-11-25 DIAGNOSIS — R92.8 ABNORMAL MAMMOGRAM: Primary | ICD-10-CM

## 2019-11-25 DIAGNOSIS — Z12.31 SCREENING MAMMOGRAM, ENCOUNTER FOR: ICD-10-CM

## 2019-11-25 PROCEDURE — 77063 BREAST TOMOSYNTHESIS BI: CPT

## 2019-12-03 ENCOUNTER — TELEPHONE (OUTPATIENT)
Dept: PRIMARY CARE CLINIC | Age: 84
End: 2019-12-03

## 2019-12-03 ENCOUNTER — OFFICE VISIT (OUTPATIENT)
Dept: PRIMARY CARE CLINIC | Age: 84
End: 2019-12-03
Payer: MEDICARE

## 2019-12-03 VITALS
OXYGEN SATURATION: 91 % | HEART RATE: 63 BPM | WEIGHT: 148.8 LBS | SYSTOLIC BLOOD PRESSURE: 148 MMHG | DIASTOLIC BLOOD PRESSURE: 62 MMHG | BODY MASS INDEX: 25.54 KG/M2

## 2019-12-03 DIAGNOSIS — Z79.4 TYPE 2 DIABETES MELLITUS WITHOUT COMPLICATION, WITH LONG-TERM CURRENT USE OF INSULIN (HCC): Primary | ICD-10-CM

## 2019-12-03 DIAGNOSIS — N63.10 BREAST MASS, RIGHT: ICD-10-CM

## 2019-12-03 DIAGNOSIS — E11.9 TYPE 2 DIABETES MELLITUS WITHOUT COMPLICATION, WITH LONG-TERM CURRENT USE OF INSULIN (HCC): Primary | ICD-10-CM

## 2019-12-03 DIAGNOSIS — I10 ESSENTIAL HYPERTENSION: ICD-10-CM

## 2019-12-03 LAB — HBA1C MFR BLD: 7.6 %

## 2019-12-03 PROCEDURE — 99214 OFFICE O/P EST MOD 30 MIN: CPT | Performed by: FAMILY MEDICINE

## 2019-12-03 PROCEDURE — G8482 FLU IMMUNIZE ORDER/ADMIN: HCPCS | Performed by: FAMILY MEDICINE

## 2019-12-03 PROCEDURE — G8427 DOCREV CUR MEDS BY ELIG CLIN: HCPCS | Performed by: FAMILY MEDICINE

## 2019-12-03 PROCEDURE — G8598 ASA/ANTIPLAT THER USED: HCPCS | Performed by: FAMILY MEDICINE

## 2019-12-03 PROCEDURE — G8400 PT W/DXA NO RESULTS DOC: HCPCS | Performed by: FAMILY MEDICINE

## 2019-12-03 PROCEDURE — 1036F TOBACCO NON-USER: CPT | Performed by: FAMILY MEDICINE

## 2019-12-03 PROCEDURE — 83036 HEMOGLOBIN GLYCOSYLATED A1C: CPT | Performed by: FAMILY MEDICINE

## 2019-12-03 PROCEDURE — 4040F PNEUMOC VAC/ADMIN/RCVD: CPT | Performed by: FAMILY MEDICINE

## 2019-12-03 PROCEDURE — 1090F PRES/ABSN URINE INCON ASSESS: CPT | Performed by: FAMILY MEDICINE

## 2019-12-03 PROCEDURE — 1123F ACP DISCUSS/DSCN MKR DOCD: CPT | Performed by: FAMILY MEDICINE

## 2019-12-03 PROCEDURE — G8417 CALC BMI ABV UP PARAM F/U: HCPCS | Performed by: FAMILY MEDICINE

## 2019-12-03 ASSESSMENT — ENCOUNTER SYMPTOMS: RESPIRATORY NEGATIVE: 1

## 2019-12-09 DIAGNOSIS — E11.9 TYPE 2 DIABETES MELLITUS WITHOUT COMPLICATION, WITH LONG-TERM CURRENT USE OF INSULIN (HCC): ICD-10-CM

## 2019-12-09 DIAGNOSIS — Z79.4 TYPE 2 DIABETES MELLITUS WITHOUT COMPLICATION, WITH LONG-TERM CURRENT USE OF INSULIN (HCC): ICD-10-CM

## 2019-12-09 RX ORDER — FUROSEMIDE 40 MG/1
TABLET ORAL
Qty: 90 TABLET | Refills: 4 | Status: SHIPPED | OUTPATIENT
Start: 2019-12-09 | End: 2021-03-08

## 2019-12-09 RX ORDER — METFORMIN HYDROCHLORIDE 500 MG/1
TABLET, EXTENDED RELEASE ORAL
Qty: 90 TABLET | Refills: 4 | Status: SHIPPED | OUTPATIENT
Start: 2019-12-09 | End: 2021-03-08

## 2019-12-11 ENCOUNTER — TELEPHONE (OUTPATIENT)
Dept: UROLOGY | Age: 84
End: 2019-12-11

## 2019-12-17 DIAGNOSIS — N63.10 BREAST MASS, RIGHT: Primary | ICD-10-CM

## 2019-12-19 ENCOUNTER — TELEPHONE (OUTPATIENT)
Dept: PRIMARY CARE CLINIC | Age: 84
End: 2019-12-19

## 2020-01-14 ENCOUNTER — TELEPHONE (OUTPATIENT)
Dept: PRIMARY CARE CLINIC | Age: 85
End: 2020-01-14

## 2020-01-14 NOTE — TELEPHONE ENCOUNTER
She can wait until the next available appt. She was recently at [facility] for breast cancer surgery. Her labs from there were ok except for a little high in sugar and a little low in calcium.

## 2020-01-20 ENCOUNTER — OFFICE VISIT (OUTPATIENT)
Dept: PRIMARY CARE CLINIC | Age: 85
End: 2020-01-20
Payer: MEDICARE

## 2020-01-20 VITALS
WEIGHT: 146.6 LBS | BODY MASS INDEX: 25.16 KG/M2 | SYSTOLIC BLOOD PRESSURE: 156 MMHG | OXYGEN SATURATION: 94 % | DIASTOLIC BLOOD PRESSURE: 54 MMHG | HEART RATE: 83 BPM

## 2020-01-20 PROCEDURE — 1123F ACP DISCUSS/DSCN MKR DOCD: CPT | Performed by: FAMILY MEDICINE

## 2020-01-20 PROCEDURE — G8482 FLU IMMUNIZE ORDER/ADMIN: HCPCS | Performed by: FAMILY MEDICINE

## 2020-01-20 PROCEDURE — 4040F PNEUMOC VAC/ADMIN/RCVD: CPT | Performed by: FAMILY MEDICINE

## 2020-01-20 PROCEDURE — G0438 PPPS, INITIAL VISIT: HCPCS | Performed by: FAMILY MEDICINE

## 2020-01-20 ASSESSMENT — PATIENT HEALTH QUESTIONNAIRE - PHQ9
SUM OF ALL RESPONSES TO PHQ QUESTIONS 1-9: 0
SUM OF ALL RESPONSES TO PHQ QUESTIONS 1-9: 0
SUM OF ALL RESPONSES TO PHQ9 QUESTIONS 1 & 2: 0
SUM OF ALL RESPONSES TO PHQ QUESTIONS 1-9: 0
2. FEELING DOWN, DEPRESSED OR HOPELESS: 0
1. LITTLE INTEREST OR PLEASURE IN DOING THINGS: 0
SUM OF ALL RESPONSES TO PHQ QUESTIONS 1-9: 0

## 2020-01-20 NOTE — PATIENT INSTRUCTIONS
Personalized Preventive Plan for Brant Moore - 1/20/2020  Medicare offers a range of preventive health benefits. Some of the tests and screenings are paid in full while other may be subject to a deductible, co-insurance, and/or copay. Some of these benefits include a comprehensive review of your medical history including lifestyle, illnesses that may run in your family, and various assessments and screenings as appropriate. After reviewing your medical record and screening and assessments performed today your provider may have ordered immunizations, labs, imaging, and/or referrals for you. A list of these orders (if applicable) as well as your Preventive Care list are included within your After Visit Summary for your review. Other Preventive Recommendations:    · A preventive eye exam performed by an eye specialist is recommended every 1-2 years to screen for glaucoma; cataracts, macular degeneration, and other eye disorders. · A preventive dental visit is recommended every 6 months. · Try to get at least 150 minutes of exercise per week or 10,000 steps per day on a pedometer . · Order or download the FREE \"Exercise & Physical Activity: Your Everyday Guide\" from [employer]. Call 4-558.600.9953 or search [employer] online. · You need 0661-6326 mg of calcium and 8443-8485 IU of vitamin D per day. It is possible to meet your calcium requirement with diet alone, but a vitamin D supplement is usually necessary to meet this goal.  · When exposed to the sun, use a sunscreen that protects against both UVA and UVB radiation with an SPF of 30 or greater. Reapply every 2 to 3 hours or after sweating, drying off with a towel, or swimming. · Always wear a seat belt when traveling in a car. Always wear a helmet when riding a bicycle or motorcycle.   Patient Education        Well Visit, Over 72: Care Instructions  Your Care Instructions    Physical exams can help test if you ever smoked or if your parent, brother, sister, or child has had an aneurysm. When should you call for help? Watch closely for changes in your health, and be sure to contact your doctor if you have any problems or symptoms that concern you. Where can you learn more? Go to [email or web address] and sign in to your [employer] account. Enter W503 in the [employer] box to learn more about \"Well Visit, Over 65: Care Instructions. \"     If you do not have an account, please click on the \"Sign Up Now\" link. Current as of: August 21, 2019  Content Version: 12.3  © 8643-5679 Healthwise, Incorporated. Care instructions adapted under license by [facility] If you have questions about a medical condition or this instruction, always ask your healthcare professional. [address] any warranty or liability for your use of this information.

## 2020-01-20 NOTE — PROGRESS NOTES
Medicare Annual Wellness Visit  Name: Jacques Lopez Date: 2020   MRN: [id number] Sex: Female   Age: 80 y.o. Ethnicity: Non-/Non    : 1934 Race: Carmen Kaufman is here for Medicare AWV    Screenings for behavioral, psychosocial and functional/safety risks, and cognitive dysfunction are all negative except as indicated below. These results, as well as other patient data from the [address] form, are documented in Flowsheets linked to this Encounter. No Known Allergies      Prior to Visit Medications    Medication Sig Taking? Authorizing Provider   metoprolol tartrate (LOPRESSOR) 25 MG tablet Take 3 tablets by mouth 2 times daily Yes Dorian Nunez MD   furosemide (LASIX) 40 MG tablet TAKE 1 TABLET DAILY Yes Dorian Nunez MD   metFORMIN (GLUCOPHAGE-XR) 500 MG extended release tablet TAKE 1 TABLET DAILY WITH SUPPER Yes Dorian Nunez MD   insulin NPH (HUMULIN N;NOVOLIN N) 100 UNIT/ML injection vial Inject 28 Units into the skin every morning Can go up to 30 units if needed Yes Dorian Nunez MD   clopidogrel (PLAVIX) 75 MG tablet Take 75 mg by mouth daily  Yes Historical Provider, MD   nitroGLYCERIN (NITROLINGUAL) 0.4 MG/SPRAY 0.4 mg spray PLACE 1 SPRAY UNDER THE TONGUE EVERY 5 MINUTES AS NEEDED FOR CHEST PAIN OR PRESSURE Yes Historical Provider, MD   blood glucose test strips (ASCENSIA AUTODISC VI;ONE TOUCH ULTRA TEST VI) strip Use with associated glucose meter: Free style Lite, test BID Yes Dorian Nunez MD   Nitroglycerin 400 MCG/SPRAY AERS Place 1 spray under the tongue every 5 minutes as needed (chest pain or pressure.) Yes MD SAM StaffordSTYLE LANCETS MISC Three times daily. E11.9 Yes Dorian Nunez MD   diclofenac sodium (VOLTAREN) 1 % GEL Apply 2 g topically 4 times daily as needed for Pain For upper extremity, use 2 gm QID. For lower extremity, use 4 gm QID.  Yes Dorian Nunez MD   pantoprazole (PROTONIX) 40 MG after surgery etc.   ·     ADL:  ADLs  In the past 7 days, did you need help from others to perform any of the following everyday activities? Eating, dressing, grooming, bathing, toileting, or walking/balance?: (!) None, Walking/Balance  Uses walker. In the past 7 days, did you need help from others to take care of any of the following? Laundry, housekeeping, banking/finances, shopping, telephone use, food preparation, transportation, or taking medications?: (!) Transportation  ADL Interventions:  · family helps her     Personalized Preventive Plan   Current Health Maintenance Status  Immunization History   Administered Date(s) Administered    Influenza Virus Vaccine 09/01/2011, 11/20/2012, 10/01/2013, 12/02/2014    Influenza, High Dose (Fluzone 65 yrs and older) 11/02/2015, 10/11/2016, 11/08/2017, 10/09/2018    Influenza, Triv, inactivated, subunit, adjuvanted, IM (Fluad 65 yrs and older) 10/10/2019    Pneumococcal Conjugate 13-valent ([id number]) 02/13/2016    Pneumococcal Polysaccharide ([id number]) 06/22/2017    Tdap (Boostrix, Adacel) 04/13/2016        Health Maintenance   Topic Date Due    Shingles Vaccine (1 of 2) 11/05/1984    DEXA (modify frequency per FRAX score)  11/05/1999    TSH testing  07/10/2018    Annual Wellness Visit (AWV)  05/29/2019    Lipid screen  01/24/2020    Potassium monitoring  09/27/2020    Creatinine monitoring  09/27/2020    DTaP/Tdap/Td vaccine (2 - Td) 04/13/2026    Flu vaccine  Completed    Pneumococcal 65+ years Vaccine  Completed     Recommendations for Preventive Services Due: see orders and patient instructions/AVS.  Pracitce breathing exercises prior to surgery  Needs cardiology clearance before surgery. Recommended screening schedule for the next 5-10 years is provided to the patient in written form: see Patient Instructions/AVS.    Orlando Bourgeois was seen today for medicare awv.     Diagnoses and all orders for this visit:    Routine general medical examination

## 2020-02-06 RX ORDER — ATORVASTATIN CALCIUM 40 MG/1
TABLET, FILM COATED ORAL
Qty: 90 TABLET | Refills: 4 | Status: SHIPPED | OUTPATIENT
Start: 2020-02-06 | End: 2021-02-12

## 2020-02-11 ENCOUNTER — TELEPHONE (OUTPATIENT)
Dept: PRIMARY CARE CLINIC | Age: 85
End: 2020-02-11

## 2020-02-11 NOTE — TELEPHONE ENCOUNTER
Patient was D/c from hospital yesterday. They had to stop home care and would like to restart tomorrow. Is this ok?

## 2020-02-12 ENCOUNTER — TELEPHONE (OUTPATIENT)
Dept: PRIMARY CARE CLINIC | Age: 85
End: 2020-02-12

## 2020-02-21 RX ORDER — PANTOPRAZOLE SODIUM 40 MG/1
TABLET, DELAYED RELEASE ORAL
Qty: 90 TABLET | Refills: 4 | Status: SHIPPED | OUTPATIENT
Start: 2020-02-21

## 2020-02-26 ENCOUNTER — TELEPHONE (OUTPATIENT)
Dept: UROLOGY | Age: 85
End: 2020-02-26

## 2020-03-16 RX ORDER — HYDRALAZINE HYDROCHLORIDE 50 MG/1
TABLET, FILM COATED ORAL
Qty: 270 TABLET | Refills: 3 | Status: SHIPPED | OUTPATIENT
Start: 2020-03-16 | End: 2021-04-06

## 2020-03-19 ENCOUNTER — TELEPHONE (OUTPATIENT)
Dept: UROLOGY | Age: 85
End: 2020-03-19

## 2020-04-15 ENCOUNTER — TELEPHONE (OUTPATIENT)
Dept: PRIMARY CARE CLINIC | Age: 85
End: 2020-04-15

## 2020-04-15 RX ORDER — CEPHALEXIN 250 MG/1
250 CAPSULE ORAL 3 TIMES DAILY
Qty: 30 CAPSULE | Refills: 0 | Status: SHIPPED | OUTPATIENT
Start: 2020-04-15 | End: 2020-04-25

## 2020-04-20 ENCOUNTER — TELEPHONE (OUTPATIENT)
Dept: PRIMARY CARE CLINIC | Age: 85
End: 2020-04-20

## 2020-04-20 RX ORDER — METOPROLOL TARTRATE 50 MG/1
75 TABLET, FILM COATED ORAL 2 TIMES DAILY
Qty: 270 TABLET | Refills: 1 | Status: SHIPPED | OUTPATIENT
Start: 2020-04-20 | End: 2020-10-15 | Stop reason: SDUPTHER

## 2020-06-03 ENCOUNTER — TELEPHONE (OUTPATIENT)
Dept: PRIMARY CARE CLINIC | Age: 85
End: 2020-06-03

## 2020-06-04 ENCOUNTER — TELEPHONE (OUTPATIENT)
Dept: PRIMARY CARE CLINIC | Age: 85
End: 2020-06-04

## 2020-06-04 RX ORDER — DIPHENOXYLATE HYDROCHLORIDE AND ATROPINE SULFATE 2.5; .025 MG/1; MG/1
1 TABLET ORAL 4 TIMES DAILY PRN
Qty: 120 TABLET | Refills: 1 | Status: SHIPPED | OUTPATIENT
Start: 2020-06-04 | End: 2020-07-04

## 2020-06-04 NOTE — TELEPHONE ENCOUNTER
Sent in refill, to [facility]: that is the pharmacy that come up  See previous message on this med: she didn't think it was working.

## 2020-06-09 ENCOUNTER — TELEPHONE (OUTPATIENT)
Dept: PRIMARY CARE CLINIC | Age: 85
End: 2020-06-09

## 2020-06-09 NOTE — TELEPHONE ENCOUNTER
Trinity Varela from [facility] calling. They are going to be seeing pt for another 60 days for cath changes. FYI.   [phone]

## 2020-08-05 ENCOUNTER — TELEPHONE (OUTPATIENT)
Dept: UROLOGY | Age: 85
End: 2020-08-05

## 2020-08-05 NOTE — TELEPHONE ENCOUNTER
Adriana Whitt from [facility] called stating \" pt is being re-certified for home care, pt will be getting catheter changes every 3 weeks. \" writer informed Adriana Whitt that's correct verbal orders to change catheter if suspected for UTI's, obtain sterile specimen and send out for culture. Per Dr. Debbi Toure protocol  Verbal understanding given call ended.

## 2020-08-06 ENCOUNTER — NURSE TRIAGE (OUTPATIENT)
Dept: OTHER | Facility: CLINIC | Age: 85
End: 2020-08-06

## 2020-08-06 NOTE — TELEPHONE ENCOUNTER
Patient called [facility]) to schedule appointment with red flag complaint, transferred to Nurse Access for triage by Ignacia Flores (agent's name). Reason for Disposition   Localized rash present > 7 days    Answer Assessment - Initial Assessment Questions  1. APPEARANCE of RASH: \"Describe the rash. \"       Red, skin peeling  2. LOCATION: \"Where is the rash located? \"       Forehead, mouth   3. NUMBER: \"How many spots are there?\"       2  4. SIZE: \"How big are the spots? \" (Inches, centimeters or compare to size of a coin)       small  5. ONSET: \"When did the rash start? \"       March  6. ITCHING: \"Does the rash itch? \" If so, ask: \"How bad is the itch? \"  (Scale 1-10; or mild, moderate, severe)      No  7. PAIN: \"Does the rash hurt? \" If so, ask: \"How bad is the pain? \"  (Scale 1-10; or mild, moderate, severe)      No  8. OTHER SYMPTOMS: \"Do you have any other symptoms? \" (e.g., fever)      No  9. PREGNANCY: \"Is there any chance you are pregnant? \" \"When was your last menstrual period? \"      No    Protocols used: RASH OR REDNESS - LOCALIZED-ADULT-OH    Informed of disposition. Care advice as documented. Instructed to call back with worsening symptoms. Soft transfer to [facility] to schedule appointment as recommended. Please do not respond to the triage nurse through this encounter. Any subsequent communication should be directly with the patient.

## 2020-08-07 ENCOUNTER — OFFICE VISIT (OUTPATIENT)
Dept: PRIMARY CARE CLINIC | Age: 85
End: 2020-08-07
Payer: MEDICARE

## 2020-08-07 VITALS
BODY MASS INDEX: 27.38 KG/M2 | SYSTOLIC BLOOD PRESSURE: 190 MMHG | HEART RATE: 69 BPM | OXYGEN SATURATION: 90 % | DIASTOLIC BLOOD PRESSURE: 68 MMHG | HEIGHT: 62 IN | WEIGHT: 148.8 LBS | TEMPERATURE: 97.9 F

## 2020-08-07 PROCEDURE — 1090F PRES/ABSN URINE INCON ASSESS: CPT | Performed by: PHYSICIAN ASSISTANT

## 2020-08-07 PROCEDURE — G8417 CALC BMI ABV UP PARAM F/U: HCPCS | Performed by: PHYSICIAN ASSISTANT

## 2020-08-07 PROCEDURE — G8400 PT W/DXA NO RESULTS DOC: HCPCS | Performed by: PHYSICIAN ASSISTANT

## 2020-08-07 PROCEDURE — G8427 DOCREV CUR MEDS BY ELIG CLIN: HCPCS | Performed by: PHYSICIAN ASSISTANT

## 2020-08-07 PROCEDURE — 1123F ACP DISCUSS/DSCN MKR DOCD: CPT | Performed by: PHYSICIAN ASSISTANT

## 2020-08-07 PROCEDURE — 1036F TOBACCO NON-USER: CPT | Performed by: PHYSICIAN ASSISTANT

## 2020-08-07 PROCEDURE — 4040F PNEUMOC VAC/ADMIN/RCVD: CPT | Performed by: PHYSICIAN ASSISTANT

## 2020-08-07 PROCEDURE — 99214 OFFICE O/P EST MOD 30 MIN: CPT | Performed by: PHYSICIAN ASSISTANT

## 2020-08-07 RX ORDER — ANASTROZOLE 1 MG/1
TABLET ORAL
COMMUNITY
Start: 2020-05-17 | End: 2020-10-19

## 2020-08-07 RX ORDER — METRONIDAZOLE 7.5 MG/G
GEL TOPICAL
Qty: 45 G | Refills: 0 | Status: SHIPPED | OUTPATIENT
Start: 2020-08-07 | End: 2020-10-19

## 2020-08-07 ASSESSMENT — ENCOUNTER SYMPTOMS
DIARRHEA: 1
CHEST TIGHTNESS: 0
SINUS PRESSURE: 0
EYE DISCHARGE: 0
SORE THROAT: 0
VOMITING: 0
RHINORRHEA: 0
PHOTOPHOBIA: 0
COUGH: 0
ABDOMINAL DISTENTION: 0
CONSTIPATION: 0
SHORTNESS OF BREATH: 0
ABDOMINAL PAIN: 0

## 2020-08-07 NOTE — PROGRESS NOTES
(goal 120/80)    Past Medical History:   Diagnosis Date    Acute MI ([facility]     Arthritis     RIGHT  KNEE    CAD (coronary artery disease)     Cervical cancer ([facility]     Hysterectomy    Convulsions ([facility] 10/23/2015    Full dentures     Upper only    GERD (gastroesophageal reflux disease)     History of congestive heart disease     [place] (hard of hearing)     Hyperlipidemia     Hypertension     Presence of indwelling Bourgeois catheter     Prolonged emergence from general anesthesia     S/P CABG x 3 1996    Seizure disorder ([facility] 10/23/2015    From stroke, on Dilantin. PATIENT STATES SEIZURE WAS IN 2008. ONLY HAD 1 SEIZURE.     Thyroid disease     Type II or unspecified type diabetes mellitus without mention of complication, not stated as uncontrolled     on Insulin & Metformin    Unspecified cerebral artery occlusion with cerebral infarction 1998    Slight Lt Arm and Leg Residual Numbess    Uses roller walker     Wears glasses       Past Surgical History:   Procedure Laterality Date    CARDIAC SURGERY  1996    CABG X 3 BYPASS    CAROTID ENDARTERECTOMY Right     CHOLECYSTECTOMY      COLONOSCOPY  about 2 years ago   [address]    3 vls    CYSTOSCOPY  08/31/2018    CYSTO, SP TUBE PLACEMENT    CYSTOSCOPY W BIOPSY OF BLADDER N/A 8/5/2019    CYSTOSCOPY, BLADDER BIOPSY AND FULGURATION performed by Jaymie Ortiz MD at [address] Bilateral 2017    CATARACTS WITH IOL PLACED    HYSTERECTOMY      ID CYSTOURETHROSCOPY N/A 7/27/2018    CYSTOSCOPY performed by Junior Hannah MD at [facility] N/A 7/27/2018    VIDEO  URODYNAMICS performed by Junior Hannah MD at [address] OFFICE/OUTPT [facility] N/A 8/31/2018    CYSTO, SP TUBE PLACEMENT performed by Junior Hannah MD at [facility] W/O ECP Left 8/22/2017    EYE CATARACT EMULSIFICATION IOL IMPLANT performed by Fredrick Jones, MD at [facility] RMVL INSJ IO LENS PROSTH W/O ECP Right 9/5/2017    EYE CATARACT EMULSIFICATION IOL IMPLANT performed by Danielle Fermin MD at [facility]         Family History   Problem Relation Age of Onset    Diabetes Father     Heart Attack Father     Heart Disease Father     Heart Attack Sister     High Blood Pressure Mother     Heart Attack Brother     No Known Problems Maternal Grandmother     No Known Problems Maternal Grandfather     No Known Problems Paternal Grandmother     No Known Problems Paternal Grandfather     High Blood Pressure Daughter     Diabetes Daughter     No Known Problems Son        Social History     Tobacco Use    Smoking status: Never Smoker    Smokeless tobacco: Never Used   Substance Use Topics    Alcohol use: No      Current Outpatient Medications   Medication Sig Dispense Refill    anastrozole (ARIMIDEX) 1 MG tablet       metroNIDAZOLE (METROGEL) 0.75 % gel Apply topically 2 times daily. 45 g 0    Misc. Devices (ROLLER WALKER) MISC 1 each by Does not apply route continuous 1 each 0    potassium chloride (KLOR-CON M) 20 MEQ extended release tablet TAKE 1 TABLET DAILY 90 tablet 0    Insulin Syringe-Needle U-100 (B-D INS SYRINGE 0.5CC/30GX1/2\") 30G X 1/2\" 0.5 ML MISC 1 each by Does not apply route 2 times daily 100 each 2    hydrALAZINE (APRESOLINE) 50 MG tablet TAKE 1 TABLET EVERY 8 HOURS 270 tablet 3    clopidogrel (PLAVIX) 75 MG tablet TAKE 1 TABLET DAILY 90 tablet 3    phenytoin (DILANTIN) 100 MG ER capsule TAKE 1 CAPSULE THREE TIMES A  capsule 3    nitroGLYCERIN (NITRODUR) 0.2 MG/HR APPLY 1 PATCH ONTO THE SKIN DAILY, 12 HOURS ON THEN 12 HOURS OFF 90 patch 3    pantoprazole (PROTONIX) 40 MG tablet TAKE 1 TABLET DAILY 90 tablet 4    blood glucose test strips (ASCENSIA AUTODISC VI;ONE TOUCH ULTRA TEST VI) strip 1 each by Subdermal route daily Use with associated glucose meter. Free style light test strips.  100 strip 11    atorvastatin (LIPITOR) 40 MG tablet TAKE 1 TABLET DAILY 90 tablet 4    furosemide (LASIX) 40 MG tablet TAKE 1 TABLET DAILY 90 tablet 4    metFORMIN (GLUCOPHAGE-XR) 500 MG extended release tablet TAKE 1 TABLET DAILY WITH SUPPER 90 tablet 4    insulin NPH (HUMULIN N;NOVOLIN N) 100 UNIT/ML injection vial Inject 28 Units into the skin every morning Can go up to 30 units if needed 2 vial 3    nitroGLYCERIN (NITROLINGUAL) 0.4 MG/SPRAY 0.4 mg spray PLACE 1 SPRAY UNDER THE TONGUE EVERY 5 MINUTES AS NEEDED FOR CHEST PAIN OR PRESSURE  2    blood glucose test strips (ASCENSIA AUTODISC VI;ONE TOUCH ULTRA TEST VI) strip Use with associated glucose meter: Free style Lite, test  strip 11    Nitroglycerin 400 MCG/SPRAY AERS Place 1 spray under the tongue every 5 minutes as needed (chest pain or pressure.) 1 Bottle 2    FREESTYLE LANCETS MISC Three times daily. E11.9 300 each 1    diclofenac sodium (VOLTAREN) 1 % GEL Apply 2 g topically 4 times daily as needed for Pain For upper extremity, use 2 gm QID. For lower extremity, use 4 gm QID. 100 g 11    oxybutynin (DITROPAN-XL) 10 MG extended release tablet Take 1 tablet by mouth 2 times daily 30 tablet 3    trospium (SANCTURA) 20 MG tablet Take 1 tablet by mouth 2 times daily (before meals) 60 tablet 3    levothyroxine (SYNTHROID) 137 MCG tablet TAKE 1 TABLET DAILY 90 tablet 3    losartan (COZAAR) 100 MG tablet TAKE 1 TABLET DAILY 90 tablet 3    amLODIPine (NORVASC) 10 MG tablet TAKE 1 TABLET DAILY 30 tablet 3    docusate sodium (COLACE) 100 MG capsule Take 1 capsule by mouth 2 times daily as needed for Constipation 30 capsule 1    mometasone (ELOCON) 0.1 % cream Apply topically 2 times daily as needed Apply topically daily.  acetaminophen (TYLENOL) 500 MG tablet Take 2 tablets by mouth every 6 hours as needed. 120 tablet 3    aspirin 325 MG tablet Take 325 mg by mouth daily.       metoprolol tartrate (LOPRESSOR) 50 MG tablet Take 1.5 tablets by mouth 2 times daily 270 tablet 1     No current facility-administered medications for this visit. No Known Allergies    Health Maintenance   Topic Date Due    Shingles Vaccine (1 of 2) 11/05/1984    DEXA (modify frequency per FRAX score)  11/05/1989    TSH testing  07/10/2018    Lipid screen  01/24/2020    Flu vaccine (1) 09/01/2020    Potassium monitoring  09/27/2020    Creatinine monitoring  09/27/2020    Annual Wellness Visit (AWV)  01/20/2021    DTaP/Tdap/Td vaccine (2 - Td) 04/13/2026    Pneumococcal 65+ years Vaccine  Completed    Hepatitis A vaccine  Aged Out    Hib vaccine  Aged Out    Meningococcal (ACWY) vaccine  Aged Out       Subjective:      Review of Systems   Constitutional: Negative for chills, fever and unexpected weight change. HENT: Negative for congestion, hearing loss, rhinorrhea, sinus pressure and sore throat. Eyes: Negative for photophobia, discharge and visual disturbance. Respiratory: Negative for cough, chest tightness and shortness of breath. Cardiovascular: Negative for chest pain, palpitations and leg swelling. Gastrointestinal: Positive for diarrhea. Negative for abdominal distention, abdominal pain, constipation and vomiting. Endocrine: Negative for polydipsia and polyuria. Genitourinary: Negative for decreased urine volume, difficulty urinating, frequency and urgency. Musculoskeletal: Negative for arthralgias, gait problem and myalgias. Skin: Negative for rash. Allergic/Immunologic: Negative for food allergies. Neurological: Negative for dizziness, weakness, numbness and headaches. Hematological: Negative for adenopathy. Psychiatric/Behavioral: Negative for dysphoric mood and sleep disturbance. The patient is not nervous/anxious.         Objective:     BP (!) 190/68   Pulse 69   Temp 97.9 °F (36.6 °C)   Ht 5' 1.81\" (1.57 m)   Wt 148 lb 12.8 oz (67.5 kg)   SpO2 90%   BMI 27.38 kg/m²   Physical Exam  Constitutional:       General: She is not in acute distress. Appearance: Normal appearance. She is not ill-appearing. HENT:      Head: Normocephalic and atraumatic. Right Ear: Ear canal and external ear normal.      Left Ear: Ear canal and external ear normal.      Nose: Nose normal.      Mouth/Throat:      Mouth: Mucous membranes are moist.   Eyes:      Extraocular Movements: Extraocular movements intact. Conjunctiva/sclera: Conjunctivae normal.      Pupils: Pupils are equal, round, and reactive to light. Neck:      Musculoskeletal: Normal range of motion and neck supple. Vascular: No carotid bruit. Cardiovascular:      Rate and Rhythm: Normal rate and regular rhythm. Pulses: Normal pulses. Heart sounds: Normal heart sounds. Pulmonary:      Effort: Pulmonary effort is normal. No respiratory distress. Breath sounds: Normal breath sounds. Abdominal:      General: Bowel sounds are normal. There is no distension. Tenderness: There is no abdominal tenderness. Genitourinary:     Comments: suprapubic catheter. Musculoskeletal: Normal range of motion. Right lower leg: Edema present. Left lower leg: Edema present. Lymphadenopathy:      Cervical: No cervical adenopathy. Skin:     General: Skin is warm and dry. Neurological:      General: No focal deficit present. Mental Status: She is alert and oriented to person, place, and time. Psychiatric:         Mood and Affect: Mood normal.         Behavior: Behavior normal.         Thought Content: Thought content normal.         Assessment:       Diagnosis Orders   1. Gastroesophageal reflux disease, esophagitis presence not specified     2. Rash of face  metroNIDAZOLE (METROGEL) 0.75 % gel   3. Suprapubic catheter ([address]     4. Seizure ([address]     5. Carcinoma of uterus ([address]     6. Type 2 diabetes mellitus without complication, with long-term current use of insulin ([address]     7. Lip lesion  External Referral To Dermatology   8.  Frequent falls Misc. Devices (ROLLER [place]) MISC    DISCONTINUED: Misc. Devices (ROLLER [place]) MISC    DISCONTINUED: Misc. Devices (ROLLER WALKER) MISC   9. Unsteady gait  Misc. Devices (ROLLER [place]) MISC    DISCONTINUED: Misc. Devices (ROLLER [place]) MISC    DISCONTINUED: Misc. Devices ([place]) MISC        Plan: Will monitor for pancreatic insufficiency if epigastric pain continues with diarrhea. MetroGel given for rash on face. Patient given referral to Derm for lesion on lip. Prescription placed for patient's walker due to the fact that the patient would greatly benefit from a new walker that is more steady in order to decrease falls. Return for Follow up if symptoms persist or worsen. Orders Placed This Encounter   Procedures    External Referral To Dermatology     Referral Priority:   Routine     Referral Type:   Eval and Treat     Referral Reason:   Specialty Services Required     Referred to Provider:   Tc Villeda MD     Requested Specialty:   Dermatology     Number of Visits Requested:   1     Orders Placed This Encounter   Medications    metroNIDAZOLE (METROGEL) 0.75 % gel     Sig: Apply topically 2 times daily. Dispense:  45 g     Refill:  0    DISCONTD: Misc. Devices (ROLLER WALKER) MISC     Si each by Does not apply route continuous     Dispense:  1 each     Refill:  0    DISCONTD: Misc. Devices (ROLLER WALKER) MISC     Si each by Does not apply route continuous     Dispense:  1 each     Refill:  0    Misc. Devices (ROLLER WALKER) MISC     Si each by Does not apply route continuous     Dispense:  1 each     Refill:  0       Patient given educationalmaterials - see patient instructions. Discussed use, benefit, and side effectsof prescribed medications. All patient questions answered. Pt voiced understanding. Reviewed health maintenance. Instructed to continue current medications, diet andexercise. Patient agreed with treatment plan. Follow up as directed. Electronicallysigned by CANDICE Dominique on 8/7/2020 at 1:55 PM    Direct supervision was done by Dr. Garett Cosby

## 2020-08-19 ENCOUNTER — TELEPHONE (OUTPATIENT)
Dept: PRIMARY CARE CLINIC | Age: 85
End: 2020-08-19

## 2020-08-25 RX ORDER — POTASSIUM CHLORIDE 1500 MG/1
TABLET, EXTENDED RELEASE ORAL
Qty: 90 TABLET | Refills: 3 | Status: SHIPPED | OUTPATIENT
Start: 2020-08-25 | End: 2021-09-22

## 2020-08-26 ENCOUNTER — TELEPHONE (OUTPATIENT)
Dept: PRIMARY CARE CLINIC | Age: 85
End: 2020-08-26

## 2020-08-26 NOTE — TELEPHONE ENCOUNTER
Pt's daughter asking if you can include a little more info in her last prog. Note to state how she benefits from the walker etc so that it can get approved by insurance. Fax to [place] [facility] when done.

## 2020-08-26 NOTE — TELEPHONE ENCOUNTER
Pt's daughter Darwin Joaquin asking if she can have testing ordered for her GERD or does she need to see GI? Please advise.

## 2020-08-27 NOTE — TELEPHONE ENCOUNTER
Jenniffer Anderson saw her recently.  I suggest UGI if Chris Irvin concurs or if he wants to treat for pancreas insufficiency

## 2020-08-28 NOTE — TELEPHONE ENCOUNTER
If patient wants we can do UGI endoscopy. This will be through a GI doctor. Procedure will require sedation.

## 2020-08-31 RX ORDER — LEVOTHYROXINE SODIUM 137 UG/1
TABLET ORAL
Qty: 90 TABLET | Refills: 3 | Status: SHIPPED | OUTPATIENT
Start: 2020-08-31 | End: 2021-08-19

## 2020-09-22 RX ORDER — LOSARTAN POTASSIUM 100 MG/1
TABLET ORAL
Qty: 90 TABLET | Refills: 3 | Status: SHIPPED | OUTPATIENT
Start: 2020-09-22 | End: 2021-10-04

## 2020-09-25 RX ORDER — ESOMEPRAZOLE MAGNESIUM 40 MG/1
40 CAPSULE, DELAYED RELEASE ORAL DAILY
Qty: 90 CAPSULE | Refills: 1 | Status: SHIPPED | OUTPATIENT
Start: 2020-09-25

## 2020-09-25 RX ORDER — DIPHENOXYLATE HYDROCHLORIDE AND ATROPINE SULFATE 2.5; .025 MG/1; MG/1
1 TABLET ORAL 4 TIMES DAILY PRN
Qty: 120 TABLET | Refills: 1 | Status: SHIPPED | OUTPATIENT
Start: 2020-09-25 | End: 2020-10-19

## 2020-10-06 ENCOUNTER — TELEPHONE (OUTPATIENT)
Dept: PRIMARY CARE CLINIC | Age: 85
End: 2020-10-06

## 2020-10-06 NOTE — TELEPHONE ENCOUNTER
Sin is calling from [facility]. She states they are re certified  Sary Crane in home care.

## 2020-10-09 RX ORDER — NITROGLYCERIN 40 MG/1
1 PATCH TRANSDERMAL DAILY
Qty: 3 PATCH | Refills: 0 | Status: SHIPPED | OUTPATIENT
Start: 2020-10-09 | End: 2021-04-06

## 2020-10-15 ENCOUNTER — NURSE ONLY (OUTPATIENT)
Dept: PRIMARY CARE CLINIC | Age: 85
End: 2020-10-15
Payer: MEDICARE

## 2020-10-15 ENCOUNTER — HOSPITAL ENCOUNTER (OUTPATIENT)
Dept: GENERAL RADIOLOGY | Age: 85
Discharge: HOME | End: 2020-10-17
Payer: MEDICARE

## 2020-10-15 ENCOUNTER — HOSPITAL ENCOUNTER (OUTPATIENT)
Age: 85
Discharge: HOME | End: 2020-10-17
Payer: MEDICARE

## 2020-10-15 VITALS — TEMPERATURE: 98 F

## 2020-10-15 PROCEDURE — 74018 RADEX ABDOMEN 1 VIEW: CPT

## 2020-10-15 PROCEDURE — G0008 ADMIN INFLUENZA VIRUS VAC: HCPCS | Performed by: FAMILY MEDICINE

## 2020-10-15 PROCEDURE — 90694 VACC AIIV4 NO PRSRV 0.5ML IM: CPT | Performed by: FAMILY MEDICINE

## 2020-10-15 RX ORDER — METOPROLOL TARTRATE 50 MG/1
75 TABLET, FILM COATED ORAL 2 TIMES DAILY
Qty: 270 TABLET | Refills: 0 | Status: SHIPPED | OUTPATIENT
Start: 2020-10-15 | End: 2021-01-25

## 2020-10-15 ASSESSMENT — PATIENT HEALTH QUESTIONNAIRE - PHQ9
SUM OF ALL RESPONSES TO PHQ9 QUESTIONS 1 & 2: 0
SUM OF ALL RESPONSES TO PHQ QUESTIONS 1-9: 0
2. FEELING DOWN, DEPRESSED OR HOPELESS: 0
SUM OF ALL RESPONSES TO PHQ QUESTIONS 1-9: 0
SUM OF ALL RESPONSES TO PHQ QUESTIONS 1-9: 0
1. LITTLE INTEREST OR PLEASURE IN DOING THINGS: 0

## 2020-10-15 NOTE — PROGRESS NOTES
After obtaining consent, and per orders of Dr. Jil Patel, injection of High Dose Flu Vaccine given in Right deltoid by Brayan Escalante. Patient instructed to remain in clinic for 20 minutes afterwards, and to report any adverse reaction to me immediately.

## 2020-10-19 ENCOUNTER — OFFICE VISIT (OUTPATIENT)
Dept: UROLOGY | Age: 85
End: 2020-10-19
Payer: MEDICARE

## 2020-10-19 VITALS — HEART RATE: 71 BPM | TEMPERATURE: 97.9 F | SYSTOLIC BLOOD PRESSURE: 160 MMHG | DIASTOLIC BLOOD PRESSURE: 85 MMHG

## 2020-10-19 PROCEDURE — 1090F PRES/ABSN URINE INCON ASSESS: CPT | Performed by: UROLOGY

## 2020-10-19 PROCEDURE — G8427 DOCREV CUR MEDS BY ELIG CLIN: HCPCS | Performed by: UROLOGY

## 2020-10-19 PROCEDURE — 4040F PNEUMOC VAC/ADMIN/RCVD: CPT | Performed by: UROLOGY

## 2020-10-19 PROCEDURE — 1123F ACP DISCUSS/DSCN MKR DOCD: CPT | Performed by: UROLOGY

## 2020-10-19 PROCEDURE — G8484 FLU IMMUNIZE NO ADMIN: HCPCS | Performed by: UROLOGY

## 2020-10-19 PROCEDURE — 1036F TOBACCO NON-USER: CPT | Performed by: UROLOGY

## 2020-10-19 PROCEDURE — G8400 PT W/DXA NO RESULTS DOC: HCPCS | Performed by: UROLOGY

## 2020-10-19 PROCEDURE — G8417 CALC BMI ABV UP PARAM F/U: HCPCS | Performed by: UROLOGY

## 2020-10-19 PROCEDURE — 99214 OFFICE O/P EST MOD 30 MIN: CPT | Performed by: UROLOGY

## 2020-10-19 ASSESSMENT — ENCOUNTER SYMPTOMS
BACK PAIN: 0
VOMITING: 0
EYE PAIN: 0
EYE REDNESS: 0
COLOR CHANGE: 0
WHEEZING: 0
COUGH: 0
NAUSEA: 0
ABDOMINAL PAIN: 0
SHORTNESS OF BREATH: 0

## 2020-10-19 NOTE — PROGRESS NOTES
[address] [address]  Dept: 92 Tika Hickey [facility] Urology Office Note - Established    Patient:  Ludivina García  YOB: 1934  Date: 10/19/2020    The patient is a 80 y.o. female whopresents today for evaluation of the following problems:   Chief Complaint   Patient presents with    Hematuria     yearly check with K[facility]; pt [facility]; pt states no complaints other than gross hematuria this morning, and pt due for cath change on Wednesday     Medication Problem     Pt being prescribed oxybuytnin and trospium by PCP        HPI  This is a very pleasant 66-year-old female who we have been seeing for a few years with chronic urinary retention. She does have a suprapubic catheter which has been indwelling for a few years. She has had stones before but her recent KUB x-ray does not show any obvious stones. She presently does not have any symptoms of stones. She does get bladder spasms with her suprapubic catheter. She is currently on oxybutynin and trospium. This seems to be working reasonably well for her. Summary of old records: N/A    Additional History: N/A    Procedures Today: N/A    Urinalysis today:  No results found for this visit on 10/19/20.     Imaging Reviewed during this Office Visit: none  (results were independently reviewed by physician and radiology report verified)    AUA Symptom Score (10/19/2020):                               Last BUN and creatinine:  Lab Results   Component Value Date    BUN 16 09/27/2019     Lab Results   Component Value Date    CREATININE 1.01 (H) 09/27/2019       Additional Lab/Culture results: none    PAST MEDICAL, FAMILY AND SOCIAL HISTORY UPDATE:  Past Medical History:   Diagnosis Date    Acute [facility] [address]     Arthritis     RIGHT  KNEE    CAD (coronary artery disease)     Cervical cancer ([facility] [address]  furosemide (LASIX) 40 MG tablet TAKE 1 TABLET DAILY 90 tablet 4    metFORMIN (GLUCOPHAGE-XR) 500 MG extended release tablet TAKE 1 TABLET DAILY WITH SUPPER 90 tablet 4    insulin NPH (HUMULIN N;NOVOLIN N) 100 UNIT/ML injection vial Inject 28 Units into the skin every morning Can go up to 30 units if needed 2 vial 3    nitroGLYCERIN (NITROLINGUAL) 0.4 MG/SPRAY 0.4 mg spray PLACE 1 SPRAY UNDER THE TONGUE EVERY 5 MINUTES AS NEEDED FOR CHEST PAIN OR PRESSURE  2    blood glucose test strips (ASCENSIA AUTODISC VI;ONE TOUCH ULTRA TEST VI) strip Use with associated glucose meter: Free style Lite, test  strip 11    Nitroglycerin 400 MCG/SPRAY AERS Place 1 spray under the tongue every 5 minutes as needed (chest pain or pressure.) 1 Bottle 2    FREESTYLE LANCETS MISC Three times daily. E11.9 300 each 1    diclofenac sodium (VOLTAREN) 1 % GEL Apply 2 g topically 4 times daily as needed for Pain For upper extremity, use 2 gm QID. For lower extremity, use 4 gm QID. 100 g 11    oxybutynin (DITROPAN-XL) 10 MG extended release tablet Take 1 tablet by mouth 2 times daily 30 tablet 3    trospium (SANCTURA) 20 MG tablet Take 1 tablet by mouth 2 times daily (before meals) 60 tablet 3    amLODIPine (NORVASC) 10 MG tablet TAKE 1 TABLET DAILY 30 tablet 3    docusate sodium (COLACE) 100 MG capsule Take 1 capsule by mouth 2 times daily as needed for Constipation 30 capsule 1    mometasone (ELOCON) 0.1 % cream Apply topically 2 times daily as needed Apply topically daily.  acetaminophen (TYLENOL) 500 MG tablet Take 2 tablets by mouth every 6 hours as needed. 120 tablet 3    aspirin 325 MG tablet Take 325 mg by mouth daily. (All medications reviewed and updated by provider sincelast office visit or hospitalization)   Patient has no known allergies.   Social History     Tobacco Use   Smoking Status Never Smoker   Smokeless Tobacco Never Used      (If patient a smoker, smoking cessation counseling offered) Social History     Substance and Sexual Activity   Alcohol Use No       REVIEW OF SYSTEMS:  Review of Systems      Physical Exam:      Vitals:    10/19/20 1325   BP: (!) 160/85   Pulse: 71   Temp:      There is no height or weight on file to calculate BMI. Patient is a 80 y.o. female in noacute distress and alert and oriented to person, place and time. Physical Exam  Constitutional: Patient in no acute distress. Neuro: Alert andoriented to person, place and time. Psych: Mood normal, affect normal  Skin: No rash noted  HEENT: Head: Normocephalic and atraumatic  Conjunctivae and EOM are normal. Pupils are equal, round  Nose: Normal  Right External Ear: Normal; Left External Ear: Normal  Mouth: Mucosa Moist  Neck: Supple  Lungs: Respiratory effort is normal  Cardiovascular: Warm & Pink  Abdomen: Soft, non-tender, non-distended with no CVA,  No flank tenderness,  Or hepatosplenomegaly   Lymphatics: No palpable lymphadenopathy. Bladder non-tender and not distended. Musculoskeletal: Normalgait and station      and Plan      1. Incomplete bladder emptying    2. Recurrent UTI    3. Kidney stone    4. Bladder spasms           Plan:   F/u 3 mo  Will dc trospium and oxybutynin as pt not sure if these meds are helping her (she is not having any spasms presently; I would like to see if she has symptoms when these meds are stopped)     Return in about 3 months (around 1/19/2021). Prescriptions Ordered:  No orders of the defined types were placed in this encounter. Orders Placed:  No orders of the defined types were placed in this encounter. Chalino Herrera MD    Agree with the ROS entered by the MA.

## 2020-11-05 ENCOUNTER — TELEPHONE (OUTPATIENT)
Dept: PRIMARY CARE CLINIC | Age: 85
End: 2020-11-05

## 2020-11-05 NOTE — TELEPHONE ENCOUNTER
Called Charmaine back: DEANA: will try to call back after hours. Called her again: her personality is changing. Her mom is telling her the account is not hers and she has no money. Stop bladder control medicine.

## 2020-11-05 NOTE — TELEPHONE ENCOUNTER
Charmaine Patricio calling and states that the pt's behavior has been changing and she is wondering if she can have a call about this to get some advise.      Chantell Caballero [phone]

## 2020-11-06 ENCOUNTER — HOSPITAL ENCOUNTER (OUTPATIENT)
Age: 85
Setting detail: SPECIMEN
Discharge: HOME | End: 2020-11-06
Payer: MEDICARE

## 2020-11-06 LAB
-: ABNORMAL
AMORPHOUS: ABNORMAL
BACTERIA: ABNORMAL
BILIRUBIN URINE: NEGATIVE
CASTS UA: ABNORMAL /LPF (ref 0–8)
COLOR: YELLOW
COMMENT UA: ABNORMAL
CRYSTALS, UA: ABNORMAL /HPF
EPITHELIAL CELLS UA: ABNORMAL /HPF (ref 0–5)
GLUCOSE URINE: NEGATIVE
KETONES, URINE: NEGATIVE
LEUKOCYTE ESTERASE, URINE: ABNORMAL
MUCUS: ABNORMAL
NITRITE, URINE: NEGATIVE
OTHER OBSERVATIONS UA: ABNORMAL
PH UA: 5.5 (ref 5–8)
PROTEIN UA: ABNORMAL
RBC UA: ABNORMAL /HPF (ref 0–4)
RENAL EPITHELIAL, UA: ABNORMAL /HPF
SPECIFIC GRAVITY UA: 1.01 (ref 1–1.03)
TRICHOMONAS: ABNORMAL
TURBIDITY: ABNORMAL
URINE HGB: NEGATIVE
UROBILINOGEN, URINE: NORMAL
WBC UA: ABNORMAL /HPF (ref 0–5)
YEAST: ABNORMAL

## 2020-11-08 LAB
CULTURE: ABNORMAL
Lab: ABNORMAL
SPECIMEN DESCRIPTION: ABNORMAL

## 2020-11-09 RX ORDER — CIPROFLOXACIN 250 MG/1
250 TABLET, FILM COATED ORAL 2 TIMES DAILY
Qty: 14 TABLET | Refills: 0 | Status: SHIPPED | OUTPATIENT
Start: 2020-11-09 | End: 2020-11-16

## 2021-01-01 NOTE — TELEPHONE ENCOUNTER
Signed order faxed to [phone] on [facility] Statement Selected

## 2021-01-22 ENCOUNTER — TELEPHONE (OUTPATIENT)
Dept: PRIMARY CARE CLINIC | Age: 86
End: 2021-01-22

## 2021-01-22 DIAGNOSIS — R19.7 DIARRHEA, UNSPECIFIED TYPE: ICD-10-CM

## 2021-01-22 RX ORDER — DIPHENOXYLATE HYDROCHLORIDE AND ATROPINE SULFATE 2.5; .025 MG/1; MG/1
1 TABLET ORAL 4 TIMES DAILY PRN
Qty: 120 TABLET | Refills: 5 | Status: SHIPPED | OUTPATIENT
Start: 2021-01-22 | End: 2021-02-21

## 2021-01-22 NOTE — TELEPHONE ENCOUNTER
Enbrel is for psoriatic arthritis, sometimes rheumatoid arthritis it is given by the rheumatologist.  It is an injection. It alters the bodies immunity, it is an immune suppressant.   You do not take it for regular arthritis i.e. osteoarthritis

## 2021-01-22 NOTE — TELEPHONE ENCOUNTER
Pt states she saw a commercial on tv for Enbrel and is asking if she can take it for hand pain?  Pharm [facility]

## 2021-01-23 DIAGNOSIS — I10 ESSENTIAL HYPERTENSION: ICD-10-CM

## 2021-01-25 ENCOUNTER — TELEPHONE (OUTPATIENT)
Dept: UROLOGY | Age: 86
End: 2021-01-25

## 2021-01-25 RX ORDER — METOPROLOL TARTRATE 50 MG/1
TABLET, FILM COATED ORAL
Qty: 270 TABLET | Refills: 0 | Status: SHIPPED | OUTPATIENT
Start: 2021-01-25 | End: 2021-05-19

## 2021-01-25 NOTE — TELEPHONE ENCOUNTER
Called to reschedule No show appointment, patient stated\" I am not going to reschedule my appointment until I get my second vaccine. \" Shauna Tenorio understood and asked patient to call office when that is completed.

## 2021-02-12 RX ORDER — ATORVASTATIN CALCIUM 40 MG/1
TABLET, FILM COATED ORAL
Qty: 90 TABLET | Refills: 3 | Status: SHIPPED | OUTPATIENT
Start: 2021-02-12 | End: 2022-04-04

## 2021-03-05 DIAGNOSIS — Z79.4 TYPE 2 DIABETES MELLITUS WITHOUT COMPLICATION, WITH LONG-TERM CURRENT USE OF INSULIN (HCC): ICD-10-CM

## 2021-03-05 DIAGNOSIS — E11.9 TYPE 2 DIABETES MELLITUS WITHOUT COMPLICATION, WITH LONG-TERM CURRENT USE OF INSULIN (HCC): ICD-10-CM

## 2021-03-08 RX ORDER — METFORMIN HYDROCHLORIDE 500 MG/1
TABLET, EXTENDED RELEASE ORAL
Qty: 90 TABLET | Refills: 3 | Status: SHIPPED | OUTPATIENT
Start: 2021-03-08 | End: 2022-02-21

## 2021-03-08 RX ORDER — FUROSEMIDE 40 MG/1
TABLET ORAL
Qty: 90 TABLET | Refills: 3 | Status: SHIPPED | OUTPATIENT
Start: 2021-03-08 | End: 2022-02-21

## 2021-03-24 DIAGNOSIS — E11.9 TYPE 2 DIABETES MELLITUS WITHOUT COMPLICATION, WITH LONG-TERM CURRENT USE OF INSULIN (HCC): ICD-10-CM

## 2021-03-24 DIAGNOSIS — Z79.4 TYPE 2 DIABETES MELLITUS WITHOUT COMPLICATION, WITH LONG-TERM CURRENT USE OF INSULIN (HCC): ICD-10-CM

## 2021-03-25 RX ORDER — BLOOD-GLUCOSE METER
KIT MISCELLANEOUS
Qty: 50 STRIP | Refills: 10 | Status: SHIPPED | OUTPATIENT
Start: 2021-03-25 | End: 2021-04-15 | Stop reason: SDUPTHER

## 2021-04-15 DIAGNOSIS — Z79.4 TYPE 2 DIABETES MELLITUS WITHOUT COMPLICATION, WITH LONG-TERM CURRENT USE OF INSULIN (HCC): ICD-10-CM

## 2021-04-15 DIAGNOSIS — E11.9 TYPE 2 DIABETES MELLITUS WITHOUT COMPLICATION, WITH LONG-TERM CURRENT USE OF INSULIN (HCC): ICD-10-CM

## 2021-04-15 RX ORDER — BLOOD-GLUCOSE METER
KIT MISCELLANEOUS
Qty: 100 STRIP | Refills: 5 | Status: SHIPPED | OUTPATIENT
Start: 2021-04-15

## 2021-04-15 RX ORDER — BLOOD-GLUCOSE METER
1 KIT MISCELLANEOUS DAILY
Qty: 1 KIT | Refills: 0 | Status: SHIPPED | OUTPATIENT
Start: 2021-04-15

## 2021-04-15 RX ORDER — LANCETS 28 GAUGE
EACH MISCELLANEOUS
Qty: 100 EACH | Refills: 5 | Status: SHIPPED | OUTPATIENT
Start: 2021-04-15

## 2021-04-19 RX ORDER — NITROGLYCERIN 40 MG/1
PATCH TRANSDERMAL
Qty: 30 PATCH | Refills: 0 | Status: SHIPPED | OUTPATIENT
Start: 2021-04-19 | End: 2021-06-04

## 2021-04-19 RX ORDER — HYDRALAZINE HYDROCHLORIDE 50 MG/1
TABLET, FILM COATED ORAL
Qty: 270 TABLET | Refills: 0 | Status: SHIPPED | OUTPATIENT
Start: 2021-04-19 | End: 2021-11-04

## 2021-04-19 RX ORDER — CLOPIDOGREL BISULFATE 75 MG/1
TABLET ORAL
Qty: 90 TABLET | Refills: 0 | Status: SHIPPED | OUTPATIENT
Start: 2021-04-19 | End: 2021-07-12

## 2021-04-19 RX ORDER — PHENYTOIN SODIUM 100 MG/1
CAPSULE, EXTENDED RELEASE ORAL
Qty: 270 CAPSULE | Refills: 0 | Status: SHIPPED | OUTPATIENT
Start: 2021-04-19 | End: 2021-08-23

## 2021-04-21 ENCOUNTER — TELEPHONE (OUTPATIENT)
Dept: PRIMARY CARE CLINIC | Age: 86
End: 2021-04-21

## 2021-04-26 ENCOUNTER — OFFICE VISIT (OUTPATIENT)
Dept: PRIMARY CARE CLINIC | Age: 86
End: 2021-04-26
Payer: MEDICARE

## 2021-04-26 VITALS
OXYGEN SATURATION: 98 % | WEIGHT: 138.8 LBS | DIASTOLIC BLOOD PRESSURE: 60 MMHG | SYSTOLIC BLOOD PRESSURE: 154 MMHG | BODY MASS INDEX: 25.54 KG/M2 | HEIGHT: 62 IN | HEART RATE: 73 BPM | TEMPERATURE: 97.9 F

## 2021-04-26 DIAGNOSIS — Z00.00 ROUTINE GENERAL MEDICAL EXAMINATION AT A HEALTH CARE FACILITY: Primary | ICD-10-CM

## 2021-04-26 DIAGNOSIS — E11.9 TYPE 2 DIABETES MELLITUS WITHOUT COMPLICATION, WITH LONG-TERM CURRENT USE OF INSULIN (HCC): ICD-10-CM

## 2021-04-26 DIAGNOSIS — Z79.4 TYPE 2 DIABETES MELLITUS WITHOUT COMPLICATION, WITH LONG-TERM CURRENT USE OF INSULIN (HCC): ICD-10-CM

## 2021-04-26 DIAGNOSIS — Z93.59 SUPRAPUBIC CATHETER (HCC): ICD-10-CM

## 2021-04-26 DIAGNOSIS — R56.9 SEIZURE (HCC): ICD-10-CM

## 2021-04-26 PROBLEM — C54.1 CARCINOMA OF ENDOMETRIUM (HCC): Status: RESOLVED | Noted: 2017-09-25 | Resolved: 2021-04-26

## 2021-04-26 LAB — HBA1C MFR BLD: 6.9 %

## 2021-04-26 PROCEDURE — 4040F PNEUMOC VAC/ADMIN/RCVD: CPT | Performed by: FAMILY MEDICINE

## 2021-04-26 PROCEDURE — G0439 PPPS, SUBSEQ VISIT: HCPCS | Performed by: FAMILY MEDICINE

## 2021-04-26 PROCEDURE — 83036 HEMOGLOBIN GLYCOSYLATED A1C: CPT | Performed by: FAMILY MEDICINE

## 2021-04-26 PROCEDURE — 1123F ACP DISCUSS/DSCN MKR DOCD: CPT | Performed by: FAMILY MEDICINE

## 2021-04-26 RX ORDER — ANASTROZOLE 1 MG/1
1 TABLET ORAL DAILY
COMMUNITY
Start: 2021-04-13

## 2021-04-26 SDOH — ECONOMIC STABILITY: FOOD INSECURITY: WITHIN THE PAST 12 MONTHS, THE FOOD YOU BOUGHT JUST DIDN'T LAST AND YOU DIDN'T HAVE MONEY TO GET MORE.: NOT ASKED

## 2021-04-26 SDOH — ECONOMIC STABILITY: TRANSPORTATION INSECURITY
IN THE PAST 12 MONTHS, HAS THE LACK OF TRANSPORTATION KEPT YOU FROM MEDICAL APPOINTMENTS OR FROM GETTING MEDICATIONS?: NO

## 2021-04-26 SDOH — ECONOMIC STABILITY: TRANSPORTATION INSECURITY
IN THE PAST 12 MONTHS, HAS LACK OF TRANSPORTATION KEPT YOU FROM MEETINGS, WORK, OR FROM GETTING THINGS NEEDED FOR DAILY LIVING?: NO

## 2021-04-26 SDOH — ECONOMIC STABILITY: FOOD INSECURITY: WITHIN THE PAST 12 MONTHS, YOU WORRIED THAT YOUR FOOD WOULD RUN OUT BEFORE YOU GOT MONEY TO BUY MORE.: NEVER TRUE

## 2021-04-26 ASSESSMENT — PATIENT HEALTH QUESTIONNAIRE - PHQ9
SUM OF ALL RESPONSES TO PHQ QUESTIONS 1-9: 0
SUM OF ALL RESPONSES TO PHQ9 QUESTIONS 1 & 2: 0

## 2021-04-26 NOTE — PROGRESS NOTES
Medicare Annual Wellness Visit  Name: Cristy Cronin Date: 2021   MRN: [id number] Sex: Female   Age: 80 y.o. Ethnicity: Non-/Non    : 1934 Race: Marques Hutton is here for Medicare AWV (Pt BP elevated 170/60) and Diabetes (Last A1C 12/3/19 7.6)    Screenings for behavioral, psychosocial and functional/safety risks, and cognitive dysfunction are all negative except as indicated below. These results, as well as other patient data from the [address] form, are documented in Flowsheets linked to this Encounter. No Known Allergies      Prior to Visit Medications    Medication Sig Taking? Authorizing Provider   anastrozole (ARIMIDEX) 1 MG tablet Take 1 tablet by mouth daily Yes Historical Provider, MD   hydrALAZINE (APRESOLINE) 50 MG tablet TAKE 1 TABLET EVERY 8 HOURS Yes Nigel Clarke MD   nitroGLYCERIN (NITRODUR) 0.2 MG/HR APPLY 1 PATCH ONTO THE SKIN DAILY, 12 HOURS ON, 12 HOURS OFF Yes Nigel Clarke MD   clopidogrel (PLAVIX) 75 MG tablet TAKE 1 TABLET DAILY Yes Nigel Clarke MD   phenytoin (DILANTIN) 100 MG ER capsule TAKE 1 CAPSULE THREE TIMES A DAY Yes Nigel Clarke MD   FreeStyle Lancets MISC Three times daily. E11.9 Yes Nigel Clarke MD   blood glucose test strips (FREESTYLE LITE) strip TEST DAILY Yes Nigel Clarke MD   glucose monitoring kit (FREESTYLE) monitoring kit 1 kit by Does not apply route daily Indications: Brand per insurance coverage.  Yes Nigel Clarke MD   metFORMIN (GLUCOPHAGE-XR) 500 MG extended release tablet TAKE 1 TABLET DAILY WITH SUPPER Yes Nigel Clarke MD   furosemide (LASIX) 40 MG tablet TAKE 1 TABLET DAILY Yes Nigel Clarke MD   atorvastatin (LIPITOR) 40 MG tablet TAKE 1 TABLET DAILY Yes Nigel Clarke MD   metoprolol tartrate (LOPRESSOR) 50 MG tablet TAKE 1 AND 1/2 TABLET BY MOUTH TWO TIMES A DAY Yes Nigel Clarke MD   esomeprazole (NEXIUM) 40 MG delayed release capsule Take 1 capsule by mouth daily Yes Lazarus Barefoot, MD   losartan (COZAAR) 100 MG tablet TAKE 1 TABLET DAILY Yes Lazarus Barefoot, MD   levothyroxine (SYNTHROID) 137 MCG tablet TAKE 1 TABLET DAILY Yes Lazarus Barefoot, MD   KLOR-CON M20 20 MEQ extended release tablet TAKE 1 TABLET DAILY Yes Lazarus Barefoot, MD   Misc. Devices (ROLLER [place]) MISC 1 each by Does not apply route continuous Yes CANDICE Saul   Insulin Syringe-Needle U-100 (B-D INS SYRINGE 0.5CC/30GX1/2\") 30G X 1/2\" 0.5 ML MISC 1 each by Does not apply route 2 times daily Yes Lazarus Barefoot, MD   pantoprazole (PROTONIX) 40 MG tablet TAKE 1 TABLET DAILY Yes Lazarus Barefoot, MD   insulin NPH (HUMULIN N;NOVOLIN N) 100 UNIT/ML injection vial Inject 28 Units into the skin every morning Can go up to 30 units if needed Yes Lazarus Barefoot, MD   nitroGLYCERIN (NITROLINGUAL) 0.4 MG/SPRAY 0.4 mg spray PLACE 1 SPRAY UNDER THE TONGUE EVERY 5 MINUTES AS NEEDED FOR CHEST PAIN OR PRESSURE Yes Historical Provider, MD   blood glucose test strips (ASCENSIA AUTODISC VI;ONE TOUCH ULTRA TEST VI) strip Use with associated glucose meter: Free style Lite, test BID Yes Lazarus Barefoot, MD   Nitroglycerin 400 MCG/SPRAY AERS Place 1 spray under the tongue every 5 minutes as needed (chest pain or pressure.) Yes Lazarus Barefoot, MD   diclofenac sodium (VOLTAREN) 1 % GEL Apply 2 g topically 4 times daily as needed for Pain For upper extremity, use 2 gm QID. For lower extremity, use 4 gm QID. Yes Lazarus Barefoot, MD   amLODIPine (NORVASC) 10 MG tablet TAKE 1 TABLET DAILY Yes Lazarus Barefoot, MD   docusate sodium (COLACE) 100 MG capsule Take 1 capsule by mouth 2 times daily as needed for Constipation Yes Christelle Cortés MD   mometasone (ELOCON) 0.1 % cream Apply topically 2 times daily as needed Apply topically daily. Yes Historical Provider, MD   acetaminophen (TYLENOL) 500 MG tablet Take 2 tablets by mouth every 6 hours as needed.  Yes Lazarus Barefoot, MD   aspirin 325 MG tablet Take 325 mg by mouth daily. Yes Historical Provider, MD         Past Medical History:   Diagnosis Date    Acute MI ([facility] [address]     Arthritis     RIGHT  KNEE    CAD (coronary artery disease)     Carcinoma of endometrium ([facility] [address] 9/25/2017    Cervical cancer ([facility] [address]     Hysterectomy    Convulsions ([facility] [address] 10/23/2015    Full dentures     Upper only    GERD (gastroesophageal reflux disease)     History of congestive heart disease     Cahto (hard of hearing)     Hyperlipidemia     Hypertension     Presence of indwelling Bourgeois catheter     Prolonged emergence from general anesthesia     S/P CABG x 3 1996    Seizure disorder ([facility] [address] 10/23/2015    From stroke, on Dilantin. PATIENT STATES SEIZURE WAS IN 2008. ONLY HAD 1 SEIZURE.     Thyroid disease     Type II or unspecified type diabetes mellitus without mention of complication, not stated as uncontrolled     on Insulin & Metformin    Unspecified cerebral artery occlusion with cerebral infarction 1998    Slight Lt Arm and Leg Residual Numbess    Uses roller walker     Wears glasses        Past Surgical History:   Procedure Laterality Date    CARDIAC SURGERY  1996    CABG X 3 BYPASS    CAROTID ENDARTERECTOMY Right     CHOLECYSTECTOMY      COLONOSCOPY  about 2 years ago   [place] [id number] vls    CYSTOSCOPY  08/31/2018    CYSTO, SP TUBE PLACEMENT    CYSTOSCOPY W BIOPSY OF BLADDER N/A 8/5/2019    CYSTOSCOPY, BLADDER BIOPSY AND FULGURATION performed by Ernestina Breen MD at [place] [address] Bilateral 2017    CATARACTS WITH IOL PLACED    HYSTERECTOMY      GA CYSTOURETHROSCOPY N/A 7/27/2018    CYSTOSCOPY performed by Beba Morton MD at [place] [address] N/A 7/27/2018    VIDEO  URODYNAMICS performed by Beba Morton MD at [address] OFFICE/OUTPT [facility] N/A 8/31/2018    CYSTO, SP TUBE PLACEMENT performed by Beba Morton MD at [facility] W/O ECP Left 8/22/2017 EYE CATARACT EMULSIFICATION IOL IMPLANT performed by Louise Alfaro MD at [address] [place] [address] W/O ECP Right 9/5/2017    EYE CATARACT EMULSIFICATION IOL IMPLANT performed by Louise Alfaro MD at [facility]           Family History   Problem Relation Age of Onset    Diabetes Father     Heart Attack Father     Heart Disease Father     Heart Attack Sister     High Blood Pressure Mother     Heart Attack Brother     No Known Problems Maternal Grandmother     No Known Problems Maternal Grandfather     No Known Problems Paternal Grandmother     No Known Problems Paternal Grandfather     High Blood Pressure Daughter     Diabetes Daughter     No Known Problems Son      Here with her daughter. CareTeam (Including outside providers/suppliers regularly involved in providing care):   Patient Care Team:  Charmayne Kirsch, MD as PCP - Andra Armenta MD as PCP - [facility] Empaneled Provider    Wt Readings from Last 3 Encounters:   04/26/21 138 lb 12.8 oz (63 kg)   08/07/20 148 lb 12.8 oz (67.5 kg)   01/20/20 146 lb 9.6 oz (66.5 kg)     Vitals:    04/26/21 1013 04/26/21 1034   BP: (!) 170/60 (!) 154/60   Pulse: 73    Temp: 97.9 °F (36.6 °C)    SpO2: 98%    Weight: 138 lb 12.8 oz (63 kg)    Height: 5' 1.81\" (1.57 m)      Body mass index is 25.54 kg/m². Based upon direct observation of the patient, evaluation of cognition reveals recent and remote memory intact. ROS: Patient has no complaints of chest pain or shortness of breath. She states she feels well every day. She likes to bake    She states she gets her suprapubic catheter changed 3 times a month. Physical Exam  Vitals signs and nursing note reviewed. Constitutional:       General: She is not in acute distress. Appearance: She is well-developed. She is not ill-appearing. HENT:      Head: Normocephalic and atraumatic.       Right Ear: Ear canal and external ear normal.      Left Ear: Ear canal and external ear normal.      Ears:      Comments: TM's dull and white  Eyes:      General: No scleral icterus. Right eye: No discharge. Left eye: No discharge. Conjunctiva/sclera: Conjunctivae normal.   Neck:      Thyroid: No thyromegaly. Trachea: No tracheal deviation. Cardiovascular:      Rate and Rhythm: Normal rate and regular rhythm. Heart sounds: Normal heart sounds. Pulmonary:      Effort: Pulmonary effort is normal. No respiratory distress. Breath sounds: Normal breath sounds. No wheezing. Musculoskeletal:      Right lower leg: No edema. Left lower leg: No edema. Lymphadenopathy:      Cervical: No cervical adenopathy. Skin:     General: Skin is warm. Findings: No rash. Neurological:      Mental Status: She is alert and oriented to person, place, and time. Psychiatric:         Mood and Affect: Mood normal.         Behavior: Behavior normal.         Thought Content: Thought content normal.         Patient's complete Health Risk Assessment and screening values have been reviewed and are found in Flowsheets. The following problems were reviewed today and where indicated follow up appointments were made and/or referrals ordered.     Positive Risk Factor Screenings with Interventions:            General Health and ACP:     Advance Directives     Power of  Living Will ACP-Advance Directive ACP-Power of     Not on File Not on File Not on File Not on File      General Health Risk Interventions:  · No Living Will: Living will discussed: to get documents        Personalized Preventive Plan   Current Health Maintenance Status  Immunization History   Administered Date(s) Administered    COVID-19, Moderna, PF, 100mcg/0.5mL 01/21/2021, 02/18/2021    Influenza Virus Vaccine 09/01/2011, 11/20/2012, 10/01/2013, 12/02/2014    Influenza, High Dose (Fluzone 65 yrs and older) 11/02/2015, 10/11/2016, 11/08/2017, 10/09/2018    InfluenzaXavier, adjuvanted, 65 yrs +, IM, PF (Fluad) 10/15/2020    Influenza, Triv, inactivated, subunit, adjuvanted, IM (Fluad 65 yrs and older) 10/10/2019    Pneumococcal Conjugate 13-valent ([id number]) 02/13/2016    Pneumococcal Polysaccharide ([id number]) 06/22/2017    Tdap (Boostrix, Adacel) 04/13/2016        Health Maintenance   Topic Date Due    Shingles Vaccine (1 of 2) Never done    TSH testing  07/10/2018    Annual Wellness Visit (AWV)  Never done    Lipid screen  01/24/2020    Potassium monitoring  09/27/2020    Creatinine monitoring  09/27/2020    DTaP/Tdap/Td vaccine (2 - Td) 04/13/2026    Flu vaccine  Completed    Pneumococcal 65+ years Vaccine  Completed    COVID-19 Vaccine  Completed    Hepatitis A vaccine  Aged Out    Hib vaccine  Aged Out    Meningococcal (ACWY) vaccine  Aged Out     Recommendations for [facility] Due: see orders and patient instructions/AVS.  . Recommended screening schedule for the next 5-10 years is provided to the patient in written form: see Patient Instructions/AVS.    Rj Ceja was seen today for medicare awv and diabetes. Diagnoses and all orders for this visit:    Routine general medical examination at a health care facility    Suprapubic catheter [facility])    Type 2 diabetes mellitus without complication, with long-term current use of insulin ([facility] [address]  -     Comprehensive Metabolic Panel, Fasting; Future  -     Lipid Panel;  Future  -     [place] COLLECTION CAPILLARY BLOOD SPECIMEN  -     POCT glycosylated hemoglobin (Hb A1C)

## 2021-04-26 NOTE — PATIENT INSTRUCTIONS
healthy. Your doctor has checked your overall health and may have suggested ways to take good care of yourself. Your doctor also may have recommended tests. At home, you can help prevent illness with healthy eating, regular exercise, and other steps. Follow-up care is a key part of your treatment and safety. Be sure to make and go to all appointments, and call your doctor if you are having problems. It's also a good idea to know your test results and keep a list of the medicines you take. How can you care for yourself at home? Get screening tests that you and your doctor decide on. Screening helps find diseases before any symptoms appear. Eat healthy foods. Choose fruits, vegetables, whole grains, protein, and low-fat dairy foods. Limit fat, especially saturated fat. Reduce salt in your diet. Limit alcohol. If you are a man, have no more than 2 drinks a day or 14 drinks a week. If you are a woman, have no more than 1 drink a day or 7 drinks a week. Since alcohol affects older adults differently, you may want to limit alcohol even more. Or you may not want to drink at all. Get at least 30 minutes of exercise on most days of the week. Walking is a good choice. You also may want to do other activities, such as running, swimming, cycling, or playing tennis or team sports. Reach and stay at a healthy weight. This will lower your risk for many problems, such as obesity, diabetes, heart disease, and high blood pressure. Do not smoke. Smoking can make health problems worse. If you need help quitting, talk to your doctor about stop-smoking programs and medicines. These can increase your chances of quitting for good. Care for your mental health. It is easy to get weighed down by worry and stress. Learn strategies to manage stress, like deep breathing and mindfulness, and stay connected with your family and community. If you find you often feel sad or hopeless, talk with your doctor. Treatment can help.   Talk to your doctor about whether you have any risk factors for sexually transmitted infections (STIs). You can help prevent STIs if you wait to have sex with a new partner (or partners) until you've each been tested for STIs. It also helps if you use condoms (male or female condoms) and if you limit your sex partners to one person who only has sex with you. Vaccines are available for some STIs. If you think you may have a problem with alcohol or drug use, talk to your doctor. This includes prescription medicines (such as amphetamines and opioids) and illegal drugs (such as cocaine and methamphetamine). Your doctor can help you figure out what type of treatment is best for you. Protect your skin from too much sun. When you're outdoors from 10 a.m. to 4 p.m., stay in the shade or cover up with clothing and a hat with a wide brim. Wear sunglasses that block UV rays. Even when it's cloudy, put broad-spectrum sunscreen (SPF 30 or higher) on any exposed skin. See a dentist one or two times a year for checkups and to have your teeth cleaned. Wear a seat belt in the car. When should you call for help? Watch closely for changes in your health, and be sure to contact your doctor if you have any problems or symptoms that concern you. Where can you learn more? Go to https://smartclipsaad.healthTV Talk Networkpartners. org and sign in to your [employer] Networks account. Enter V388 in the [facility] box to learn more about \"Well Visit, Over 65: Care Instructions. \"     If you do not have an account, please click on the \"Sign Up Now\" link. Current as of: May 27, 2020               Content Version: 12.8  © 1335-0105 Healthwise, Incorporated. Care instructions adapted under license by [facility] ([facility]). If you have questions about a medical condition or this instruction, always ask your healthcare professional. [place] [address] any warranty or liability for your use of this information.          Patient Education        Learning About Living [place]  What is a living will? A living will, also called a declaration, is a legal form. It tells your family and your doctor your wishes when you can't speak for yourself. It's used by the health professionals who will treat you as you near the end of your life or if you get seriously hurt or ill. If you put your wishes in writing, your loved ones and others will know what kind of care you want. They won't need to guess. This can ease your mind and be helpful to others. And you can change or cancel your living will at any time. A living will is not the same as an estate or property will. An estate will explains what you want to happen with your money and property after you die. How do you use it? A living will is used to describe the kinds of treatment or life support you want as you near the end of your life or if you get seriously hurt or ill. Keep these facts in mind about living katz. Your living will is used only if you can't speak or make decisions for yourself. Most often, one or more doctors must certify that you can't speak or decide for yourself before your living will takes effect. If you get better and can speak for yourself again, you can accept or refuse any treatment. It doesn't matter what you said in your living will. Some states may limit your right to refuse treatment in certain cases. For example, you may need to clearly state in your living will that you don't want artificial hydration and nutrition, such as being fed through a tube. Is a living will a legal document? A living will is a legal document. Each state has its own laws about living katz. And a living will may be called something else in your state. Here are some things to know about living katz. You don't need an  to complete a living will. But legal advice can be helpful if your state's laws are unclear.  It can also help if your health history is complicated or your family can't agree on what should be in your living will. You can change your living will at any time. Some people find that their wishes about end-of-life care change as their health changes. If you make big changes to your living will, complete a new form. If you move to another state, make sure that your living will is legal in the state where you now live. In most cases, doctors will respect your wishes even if you have a form from a different state. You might use a universal form that has been approved by many states. This kind of form can sometimes be filled out and stored online. Your digital copy will then be available wherever you have a connection to the internet. The doctors and nurses who need to treat you can find it right away. Your state may offer an online registry. This is another place where you can store your living will online. It's a good idea to get your living will notarized. This means using a person called a Jelli to watch two people sign, or witness, your living will. What should you know when you create a living will? Here are some questions to ask yourself as you make your living will:  Do you know enough about life support methods that might be used? If not, talk to your doctor so you know what might be done if you can't breathe on your own, your heart stops, or you can't swallow. What things would you still want to be able to do after you receive life-support methods? Would you want to be able to walk? To speak? To eat on your own? To live without the help of machines? Do you want certain Jewish practices performed if you become very ill? If you have a choice, where do you want to be cared for? In your home? At a hospital or nursing home? If you have a choice at the end of your life, where would you prefer to die? At home? In a hospital or nursing home? Somewhere else? Would you prefer to be buried or cremated? Do you want your organs to be donated after you die?   What should you do with your living will? Make sure that your family members and your health care agent have copies of your living will (also called a declaration). Give your doctor a copy of your living will. Ask him or her to keep it as part of your medical record. If you have more than one doctor, make sure that each one has a copy. Put a copy of your living will where it can be easily found. For example, some people may put a copy on their refrigerator door. If you are using a digital copy, be sure your doctor, family members, and health care agent know how to find and access it. Where can you learn more? Go to https://chpepiceweb.Shopsy. org and sign in to your AlphaSmart account. Enter B179 in the [employer] box to learn more about \"Learning About Living Zelda Cummings. \"     If you do not have an account, please click on the \"Sign Up Now\" link. Current as of: July 17, 2020               Content Version: 12.8  © 2006-2021 Healthwise, Incorporated. Care instructions adapted under license by [facility] ([facility]). If you have questions about a medical condition or this instruction, always ask your healthcare professional. [place] [address] any warranty or liability for your use of this information.

## 2021-05-18 DIAGNOSIS — I10 ESSENTIAL HYPERTENSION: ICD-10-CM

## 2021-05-19 RX ORDER — METOPROLOL TARTRATE 50 MG/1
TABLET, FILM COATED ORAL
Qty: 270 TABLET | Refills: 0 | Status: SHIPPED
Start: 2021-05-19 | End: 2021-09-08 | Stop reason: SDUPTHER

## 2021-06-07 ENCOUNTER — TELEPHONE (OUTPATIENT)
Dept: PRIMARY CARE CLINIC | Age: 86
End: 2021-06-07

## 2021-06-07 NOTE — TELEPHONE ENCOUNTER
Pain in both hands - left is worse than right. She is unable to sleep at night due to the pain and numbness. Asking if she can have something for the pain. Pharmacy - [facility] on [address].

## 2021-06-07 NOTE — TELEPHONE ENCOUNTER
She should take tylenol 650 mg 3-4 x a day for pain. Can also use VOltaren gel otc on painful areas up to 4 x a day. If not helping then needs to come in.

## 2021-06-08 NOTE — TELEPHONE ENCOUNTER
Check CBC  Will need to come in for pelvic  Exam  She's had hysterectomy
Pt notified and scheduled pt a Pelvic Exam with Davie Matute on tues 4/16/19.
Stefany Li from [address] home care calling to let you know that pt had a normal BM today and yesterday. Noticed a lot of blood on the toilet paper today. When pt's Supra pubic cath was changed, tarah noticed some blood clots in the vaginal area and blood stains on her depends. Please advise. Stefany Li stated to call pt back later. Tarah's # is [phone] if any questions for her.
No

## 2021-06-14 ENCOUNTER — OFFICE VISIT (OUTPATIENT)
Dept: UROLOGY | Age: 86
End: 2021-06-14
Payer: MEDICARE

## 2021-06-14 VITALS
SYSTOLIC BLOOD PRESSURE: 138 MMHG | RESPIRATION RATE: 17 BRPM | WEIGHT: 138 LBS | DIASTOLIC BLOOD PRESSURE: 64 MMHG | BODY MASS INDEX: 26.06 KG/M2 | TEMPERATURE: 97.2 F | HEART RATE: 63 BPM | HEIGHT: 61 IN

## 2021-06-14 DIAGNOSIS — N20.0 KIDNEY STONE: ICD-10-CM

## 2021-06-14 DIAGNOSIS — R33.9 INCOMPLETE BLADDER EMPTYING: Primary | ICD-10-CM

## 2021-06-14 DIAGNOSIS — N39.0 RECURRENT UTI: ICD-10-CM

## 2021-06-14 DIAGNOSIS — N32.89 BLADDER SPASMS: ICD-10-CM

## 2021-06-14 PROCEDURE — 1123F ACP DISCUSS/DSCN MKR DOCD: CPT | Performed by: UROLOGY

## 2021-06-14 PROCEDURE — 4040F PNEUMOC VAC/ADMIN/RCVD: CPT | Performed by: UROLOGY

## 2021-06-14 PROCEDURE — 1090F PRES/ABSN URINE INCON ASSESS: CPT | Performed by: UROLOGY

## 2021-06-14 PROCEDURE — 1036F TOBACCO NON-USER: CPT | Performed by: UROLOGY

## 2021-06-14 PROCEDURE — 99214 OFFICE O/P EST MOD 30 MIN: CPT | Performed by: UROLOGY

## 2021-06-14 PROCEDURE — G8427 DOCREV CUR MEDS BY ELIG CLIN: HCPCS | Performed by: UROLOGY

## 2021-06-14 PROCEDURE — G8417 CALC BMI ABV UP PARAM F/U: HCPCS | Performed by: UROLOGY

## 2021-06-14 ASSESSMENT — ENCOUNTER SYMPTOMS
NAUSEA: 0
BACK PAIN: 0
ABDOMINAL PAIN: 0
COUGH: 0
EYE PAIN: 0
SHORTNESS OF BREATH: 0
CONSTIPATION: 0
EYE REDNESS: 0
DIARRHEA: 0
VOMITING: 0
WHEEZING: 0

## 2021-06-14 NOTE — PROGRESS NOTES
[address] [address]  Dept: [address] [facility] Urology Office Note - Established    Patient:  Merary Hooker  YOB: 1934  Date: 6/14/2021    The patient is a 80 y.o. female whopresents today for evaluation of the following problems:   Chief Complaint   Patient presents with    Follow-up     3 month follow up on medications       HPI  Christina Buitrago is an 14-year-old female who has chronic urinary retention. She does have a suprapubic catheter. She was having bladder spasms and was previously taking trospium and oxybutynin. Her symptoms had improved and we had stopped these medications. She continues to do well in spite of not taking them. She has had stones in the past but presently does not have any symptoms of stones. Summary of old records: N/A    Additional History: N/A    Procedures Today: N/A    Urinalysis today:  No results found for this visit on 06/14/21. Imaging Reviewed during this Office Visit: none  (results were independently reviewed by physician and radiology report verified)    AUA Symptom Score (6/14/2021):   INCOMPLETE EMPTYING: How often have you had the sensation of not emptying your bladder?: Not at all  FREQUENCY: How often do you have to urinate less than every two hours?: Not at all  INTERMITTENCY: How often have you found you stopped and started again several times when you urinated?: Not at all  URGENCY: How often have you found it difficult to postpone urination?: Not at all  WEAK STREAM: How often have you had a weak urinary stream?: Not at all  STRAINING: How often have you had to strain to start  urination?: Not at all  NOCTURIA: How many times did you typically get up at night to uriniate?: NONE  TOTAL I-PSS SCORE[de-identified] 0       Last BUN and creatinine:  Lab Results   Component Value Date    BUN 16 09/27/2019     Lab Results   Component Value Date    CREATININE 1.01 (H) CTRC RMVL INSJ IO LENS PROSTH W/O ECP Left 8/22/2017    EYE CATARACT EMULSIFICATION IOL IMPLANT performed by Blanca Benavides MD at [address] [facility] W/O ECP Right 9/5/2017    EYE CATARACT EMULSIFICATION IOL IMPLANT performed by Blanca Benavides MD at [facility]       Family History   Problem Relation Age of Onset    Diabetes Father     Heart Attack Father     Heart Disease Father     Heart Attack Sister     High Blood Pressure Mother     Heart Attack Brother     No Known Problems Maternal Grandmother     No Known Problems Maternal Grandfather     No Known Problems Paternal Grandmother     No Known Problems Paternal Grandfather     High Blood Pressure Daughter     Diabetes Daughter     No Known Problems Son      Outpatient Medications Marked as Taking for the 6/14/21 encounter (Office Visit) with Anupam Goodman MD   Medication Sig Dispense Refill    nitroGLYCERIN (NITRODUR) 0.2 MG/HR APPLY 1 PATCH ONTO THE SKIN DAILY, 12 HOURS ON, 12 HOURS OFF 30 patch 1    metoprolol tartrate (LOPRESSOR) 50 MG tablet TAKE 1 AND 1/2 TABLET BY MOUTH TWICE A  tablet 0    anastrozole (ARIMIDEX) 1 MG tablet Take 1 tablet by mouth daily      hydrALAZINE (APRESOLINE) 50 MG tablet TAKE 1 TABLET EVERY 8 HOURS 270 tablet 0    clopidogrel (PLAVIX) 75 MG tablet TAKE 1 TABLET DAILY 90 tablet 0    phenytoin (DILANTIN) 100 MG ER capsule TAKE 1 CAPSULE THREE TIMES A  capsule 0    FreeStyle Lancets MISC Three times daily. E11.9 100 each 5    blood glucose test strips (FREESTYLE LITE) strip TEST DAILY 100 strip 5    glucose monitoring kit (FREESTYLE) monitoring kit 1 kit by Does not apply route daily Indications: Brand per insurance coverage.  1 kit 0    metFORMIN (GLUCOPHAGE-XR) 500 MG extended release tablet TAKE 1 TABLET DAILY WITH SUPPER 90 tablet 3    furosemide (LASIX) 40 MG tablet TAKE 1 TABLET DAILY 90 tablet 3    atorvastatin (LIPITOR) 40 MG tablet TAKE 1 TABLET DAILY 90 tablet 3    esomeprazole (NEXIUM) 40 MG delayed release capsule Take 1 capsule by mouth daily 90 capsule 1    losartan (COZAAR) 100 MG tablet TAKE 1 TABLET DAILY 90 tablet 3    levothyroxine (SYNTHROID) 137 MCG tablet TAKE 1 TABLET DAILY 90 tablet 3    KLOR-CON M20 20 MEQ extended release tablet TAKE 1 TABLET DAILY 90 tablet 3    Misc. Devices (ROLLER WALKER) MISC 1 each by Does not apply route continuous 1 each 0    Insulin Syringe-Needle U-100 (B-D INS SYRINGE 0.5CC/30GX1/2\") 30G X 1/2\" 0.5 ML MISC 1 each by Does not apply route 2 times daily 100 each 2    pantoprazole (PROTONIX) 40 MG tablet TAKE 1 TABLET DAILY 90 tablet 4    insulin NPH (HUMULIN N;NOVOLIN N) 100 UNIT/ML injection vial Inject 28 Units into the skin every morning Can go up to 30 units if needed 2 vial 3    nitroGLYCERIN (NITROLINGUAL) 0.4 MG/SPRAY 0.4 mg spray PLACE 1 SPRAY UNDER THE TONGUE EVERY 5 MINUTES AS NEEDED FOR CHEST PAIN OR PRESSURE  2    blood glucose test strips (ASCENSIA AUTODISC VI;ONE TOUCH ULTRA TEST VI) strip Use with associated glucose meter: Free style Lite, test  strip 11    Nitroglycerin 400 MCG/SPRAY AERS Place 1 spray under the tongue every 5 minutes as needed (chest pain or pressure.) 1 Bottle 2    diclofenac sodium (VOLTAREN) 1 % GEL Apply 2 g topically 4 times daily as needed for Pain For upper extremity, use 2 gm QID. For lower extremity, use 4 gm QID. 100 g 11    amLODIPine (NORVASC) 10 MG tablet TAKE 1 TABLET DAILY 30 tablet 3    docusate sodium (COLACE) 100 MG capsule Take 1 capsule by mouth 2 times daily as needed for Constipation 30 capsule 1    mometasone (ELOCON) 0.1 % cream Apply topically 2 times daily as needed Apply topically daily.  acetaminophen (TYLENOL) 500 MG tablet Take 2 tablets by mouth every 6 hours as needed. 120 tablet 3    aspirin 325 MG tablet Take 325 mg by mouth daily.         (All medications reviewed and updated by provider sincelast office

## 2021-06-14 NOTE — PROGRESS NOTES
Review of Systems   Constitutional: Negative for appetite change, chills and fever. Eyes: Negative for pain, redness and visual disturbance. Respiratory: Negative for cough, shortness of breath and wheezing. Cardiovascular: Negative for chest pain and leg swelling. Gastrointestinal: Negative for abdominal pain, constipation, diarrhea, nausea and vomiting. Genitourinary: Negative for difficulty urinating, dysuria, flank pain, frequency, hematuria and urgency. Musculoskeletal: Negative for back pain, joint swelling and myalgias. Skin: Negative for rash and wound. Neurological: Negative for dizziness, tremors and numbness. Hematological: Does not bruise/bleed easily.    none

## 2021-07-12 RX ORDER — CLOPIDOGREL BISULFATE 75 MG/1
TABLET ORAL
Qty: 90 TABLET | Refills: 3 | Status: SHIPPED | OUTPATIENT
Start: 2021-07-12 | End: 2022-07-05

## 2021-08-13 RX ORDER — NITROGLYCERIN 40 MG/1
PATCH TRANSDERMAL
Qty: 90 PATCH | Refills: 1 | Status: SHIPPED | OUTPATIENT
Start: 2021-08-13 | End: 2022-04-04

## 2021-08-19 RX ORDER — LEVOTHYROXINE SODIUM 137 UG/1
TABLET ORAL
Qty: 90 TABLET | Refills: 3 | Status: SHIPPED | OUTPATIENT
Start: 2021-08-19

## 2021-08-23 RX ORDER — PHENYTOIN SODIUM 100 MG/1
CAPSULE, EXTENDED RELEASE ORAL
Qty: 270 CAPSULE | Refills: 3 | Status: SHIPPED | OUTPATIENT
Start: 2021-08-23 | End: 2021-08-26 | Stop reason: SDUPTHER

## 2021-08-26 RX ORDER — PHENYTOIN SODIUM 100 MG/1
CAPSULE, EXTENDED RELEASE ORAL
Qty: 42 CAPSULE | Refills: 0 | Status: SHIPPED | OUTPATIENT
Start: 2021-08-26 | End: 2022-08-01

## 2021-09-07 DIAGNOSIS — I10 ESSENTIAL HYPERTENSION: ICD-10-CM

## 2021-09-08 RX ORDER — METOPROLOL TARTRATE 50 MG/1
75 TABLET, FILM COATED ORAL 2 TIMES DAILY
Qty: 270 TABLET | Refills: 1 | Status: SHIPPED | OUTPATIENT
Start: 2021-09-08 | End: 2022-01-07 | Stop reason: SDUPTHER

## 2021-09-22 RX ORDER — POTASSIUM CHLORIDE 1500 MG/1
TABLET, EXTENDED RELEASE ORAL
Qty: 90 TABLET | Refills: 3 | Status: SHIPPED | OUTPATIENT
Start: 2021-09-22

## 2021-10-04 RX ORDER — LOSARTAN POTASSIUM 100 MG/1
TABLET ORAL
Qty: 90 TABLET | Refills: 3 | Status: SHIPPED | OUTPATIENT
Start: 2021-10-04

## 2021-10-19 ENCOUNTER — OFFICE VISIT (OUTPATIENT)
Dept: PRIMARY CARE CLINIC | Age: 86
End: 2021-10-19
Payer: MEDICARE

## 2021-10-19 VITALS
HEART RATE: 74 BPM | DIASTOLIC BLOOD PRESSURE: 68 MMHG | SYSTOLIC BLOOD PRESSURE: 146 MMHG | BODY MASS INDEX: 24.98 KG/M2 | OXYGEN SATURATION: 98 % | WEIGHT: 132.2 LBS

## 2021-10-19 DIAGNOSIS — I10 ESSENTIAL HYPERTENSION: ICD-10-CM

## 2021-10-19 DIAGNOSIS — Z23 NEED FOR VACCINATION: ICD-10-CM

## 2021-10-19 DIAGNOSIS — K59.00 CONSTIPATION, UNSPECIFIED CONSTIPATION TYPE: Primary | ICD-10-CM

## 2021-10-19 DIAGNOSIS — E11.9 TYPE 2 DIABETES MELLITUS WITHOUT COMPLICATION, WITH LONG-TERM CURRENT USE OF INSULIN (HCC): ICD-10-CM

## 2021-10-19 DIAGNOSIS — Z79.4 TYPE 2 DIABETES MELLITUS WITHOUT COMPLICATION, WITH LONG-TERM CURRENT USE OF INSULIN (HCC): ICD-10-CM

## 2021-10-19 LAB — HBA1C MFR BLD: 6.7 %

## 2021-10-19 PROCEDURE — 1090F PRES/ABSN URINE INCON ASSESS: CPT | Performed by: FAMILY MEDICINE

## 2021-10-19 PROCEDURE — G0008 ADMIN INFLUENZA VIRUS VAC: HCPCS | Performed by: FAMILY MEDICINE

## 2021-10-19 PROCEDURE — G8484 FLU IMMUNIZE NO ADMIN: HCPCS | Performed by: FAMILY MEDICINE

## 2021-10-19 PROCEDURE — 4040F PNEUMOC VAC/ADMIN/RCVD: CPT | Performed by: FAMILY MEDICINE

## 2021-10-19 PROCEDURE — G8420 CALC BMI NORM PARAMETERS: HCPCS | Performed by: FAMILY MEDICINE

## 2021-10-19 PROCEDURE — 90694 VACC AIIV4 NO PRSRV 0.5ML IM: CPT | Performed by: FAMILY MEDICINE

## 2021-10-19 PROCEDURE — 83036 HEMOGLOBIN GLYCOSYLATED A1C: CPT | Performed by: FAMILY MEDICINE

## 2021-10-19 PROCEDURE — 1036F TOBACCO NON-USER: CPT | Performed by: FAMILY MEDICINE

## 2021-10-19 PROCEDURE — 99214 OFFICE O/P EST MOD 30 MIN: CPT | Performed by: FAMILY MEDICINE

## 2021-10-19 PROCEDURE — G8427 DOCREV CUR MEDS BY ELIG CLIN: HCPCS | Performed by: FAMILY MEDICINE

## 2021-10-19 PROCEDURE — 1123F ACP DISCUSS/DSCN MKR DOCD: CPT | Performed by: FAMILY MEDICINE

## 2021-10-19 RX ORDER — NITROGLYCERIN 0.4 MG/1
0.4 TABLET SUBLINGUAL EVERY 5 MIN PRN
Qty: 25 TABLET | Refills: 1 | Status: SHIPPED | OUTPATIENT
Start: 2021-10-19

## 2021-10-19 ASSESSMENT — ENCOUNTER SYMPTOMS: RESPIRATORY NEGATIVE: 1

## 2021-10-19 NOTE — PATIENT INSTRUCTIONS
Patient Education        Learning About Eating More Fruits and Vegetables  What are some quick tips for eating more fruits and vegetables? We're all encouraged to eat more fruits and vegetables. Yet it can seem like one more chore on the daily to-do list. But you can add color and crunch to your meals--and lots of nutrition--with these quick tips. · Brighten up your breakfast.  ? Add sliced fruit or frozen berries to your yogurt, pancakes, or cereal.  ? Blend fresh or frozen fruit, veggies, and yogurt with a little fruit juice, and you've got a tasty smoothie. ? Make your scrambled eggs a gourmet treat by adding onions, celery, and bell peppers. ? Bake up some bran muffins with grated carrots added into the mix. · Make a livelier lunch. ? Jazz up tuna or chicken salad with apple chunks, celery, or grapes--or all of them! ? Add cucumbers, avocado slices, tomatoes, and lettuce to your sandwiches. ? Kick up the flavor of grilled cheese sandwiches with spinach and tomatoes. ? Puree some potatoes or squash to add to tomato soup. · Add delicious veggies to dinner. ? Give more color and taste to salads. Stir in red cabbage, carrots, and bell peppers. Top salads with dried cranberries or raisins. \"Frost\" your salad with orange sections or strawberries. ? Keep a bag or two of frozen vegetables ready to pull out of the freezer for a side dish. ? Spice up spaghetti and meatballs with mushrooms and bell peppers. ? Roast vegetables like cauliflower or squash in the oven with olive oil to bring out their flavor. ? Season your veggie dish with herbs like basil and janet and a splash of lemon juice and olive oil. ? If you've got a main dish in the oven, stick in a potato to round out your meal.  · Grab some healthy snacks on the go. ? Scoop up an apple, banana, or plum for a quick snack. ? Cut up raw fruits and veggies to keep in your fridge. Grapes, oranges, carrots, and celery are great choices.  They'll be ready for a quick snack or an after-school treat. ? Dip raw vegetables in hummus or peanut butter. ? Keep dried fruit on hand for an easy \"take with you\" snack. · Make something sweet--and healthy. ? Try baked apples or pears topped with cinnamon and honey for a delicious dessert. ? Make chocolate chip cookies even better with grated carrots added to the mix. Where can you learn more? Go to https://SignaCert.Weroom. org and sign in to your Proofpoint account. Enter F050 in the [employer] box to learn more about \"Learning About Eating More Fruits and Vegetables. \"     If you do not have an account, please click on the \"Sign Up Now\" link. Current as of: December 17, 2020               Content Version: 13.0  © 0949-4597 Healthwise, Incorporated. Care instructions adapted under license by [facility] ([facility]). If you have questions about a medical condition or this instruction, always ask your healthcare professional. [place] [address] any warranty or liability for your use of this information.

## 2021-10-19 NOTE — PROGRESS NOTES
[address] [facility] PRIMARY CARE  [address] [facility] [address]  Dept: [address] [facility] is a 80 y.o. female Established patient, who presents today for her medical conditions/complaintsas noted below. Chief Complaint   Patient presents with    6 Month Follow-Up     diabetic    Other     pt c/o feet swelling       HPI:     HPI  Taking fiber in her coffee 2-3  X a day  Having a small log of BM 3 x a day, and some diarrhea  does well overall. She does have to use a walker. She does have right knee pain. Reviewed prior notes None  Reviewed previous Labs, Imaging and Hospital Records    Here with her daughter. She states they took all the bowel medicine she had from OTC and threw them out, only using fiber now    LDL Cholesterol (mg/dL)   Date Value   12/01/2014 48     LDL Calculated (mg/dL)   Date Value   01/24/2019 46   07/10/2017 44       (goal LDL is <100)   AST (U/L)   Date Value   08/01/2019 20     ALT (U/L)   Date Value   08/01/2019 13     BUN (mg/dL)   Date Value   09/27/2019 16     Hemoglobin A1C (%)   Date Value   04/26/2021 6.9     BP Readings from Last 3 Encounters:   10/19/21 (!) 148/72   06/14/21 138/64   04/26/21 (!) 154/60          (goal 120/80)    Past Medical History:   Diagnosis Date    Acute MI ([address]     Arthritis     RIGHT  KNEE    CAD (coronary artery disease)     Carcinoma of endometrium ([address] 9/25/2017    Cervical cancer ([address]     Hysterectomy    Convulsions ([address] 10/23/2015    Full dentures     Upper only    GERD (gastroesophageal reflux disease)     History of congestive heart disease     [place] (hard of hearing)     Hyperlipidemia     Hypertension     Presence of indwelling Bourgeois catheter     Prolonged emergence from general anesthesia     S/P CABG x 3 1996    Seizure disorder ([address] 10/23/2015    From stroke, on Dilantin. PATIENT STATES SEIZURE WAS IN 2008. ONLY HAD 1 SEIZURE.     Thyroid disease     Type II or unspecified type diabetes mellitus without mention of complication, not stated as uncontrolled     on Insulin & Metformin    Unspecified cerebral artery occlusion with cerebral infarction 1998    Slight Lt Arm and Leg Residual Numbess    Uses roller walker     Wears glasses       Past Surgical History:   Procedure Laterality Date    CARDIAC SURGERY  1996    CABG X 3 BYPASS    CAROTID ENDARTERECTOMY Right     CHOLECYSTECTOMY      COLONOSCOPY  about 2 years ago   [address]    3 vls    CYSTOSCOPY  08/31/2018    CYSTO, SP TUBE PLACEMENT    CYSTOSCOPY W BIOPSY OF BLADDER N/A 8/5/2019    CYSTOSCOPY, BLADDER BIOPSY AND FULGURATION performed by Grace Cannon MD at [address] [facility] Bilateral 2017    CATARACTS WITH IOL PLACED    HYSTERECTOMY      [place] CYSTOURETHROSCOPY N/A 7/27/2018    CYSTOSCOPY performed by Cara Stone MD at [facility] N/A 7/27/2018    VIDEO  URODYNAMICS performed by Cara Stone MD at [address] OFFICE/OUTPT [address] [facility] N/A 8/31/2018    CYSTO, SP TUBE PLACEMENT performed by Cara Stone MD at [address] W/O ECP Left 8/22/2017    EYE CATARACT EMULSIFICATION IOL IMPLANT performed by Leta Flowers MD at [address] W/O ECP Right 9/5/2017    EYE CATARACT EMULSIFICATION IOL IMPLANT performed by Leta Flowers MD at [place] [address]         Family History   Problem Relation Age of Onset    Diabetes Father     Heart Attack Father     Heart Disease Father     Heart Attack Sister     High Blood Pressure Mother     Heart Attack Brother     No Known Problems Maternal Grandmother     No Known Problems Maternal Grandfather     No Known Problems Paternal Grandmother     No Known Problems Paternal Grandfather     High Blood Pressure Daughter     Diabetes Daughter     No Known Problems Son        Social History Tobacco Use    Smoking status: Never Smoker    Smokeless tobacco: Never Used   Substance Use Topics    Alcohol use: No      Current Outpatient Medications   Medication Sig Dispense Refill    nitroGLYCERIN (NITROSTAT) 0.4 MG SL tablet Place 1 tablet under the tongue every 5 minutes as needed for Chest pain up to max of 3 total doses. If no relief after 1 dose, call 911. 25 tablet 1    losartan (COZAAR) 100 MG tablet TAKE 1 TABLET DAILY 90 tablet 3    KLOR-CON M20 20 MEQ extended release tablet TAKE 1 TABLET DAILY 90 tablet 3    metoprolol tartrate (LOPRESSOR) 50 MG tablet Take 1.5 tablets by mouth 2 times daily 270 tablet 1    phenytoin (DILANTIN) 100 MG ER capsule Take One Capsule Three Times A Day 42 capsule 0    levothyroxine (SYNTHROID) 137 MCG tablet TAKE 1 TABLET DAILY 90 tablet 3    clopidogrel (PLAVIX) 75 MG tablet TAKE 1 TABLET DAILY 90 tablet 3    hydrALAZINE (APRESOLINE) 50 MG tablet TAKE 1 TABLET EVERY 8 HOURS 270 tablet 0    FreeStyle Lancets MISC Three times daily. E11.9 100 each 5    blood glucose test strips (FREESTYLE LITE) strip TEST DAILY 100 strip 5    glucose monitoring kit (FREESTYLE) monitoring kit 1 kit by Does not apply route daily Indications: Brand per insurance coverage. 1 kit 0    metFORMIN (GLUCOPHAGE-XR) 500 MG extended release tablet TAKE 1 TABLET DAILY WITH SUPPER 90 tablet 3    furosemide (LASIX) 40 MG tablet TAKE 1 TABLET DAILY 90 tablet 3    atorvastatin (LIPITOR) 40 MG tablet TAKE 1 TABLET DAILY 90 tablet 3    Misc.  Devices (ROLLER WALKER) MISC 1 each by Does not apply route continuous 1 each 0    Insulin Syringe-Needle U-100 (B-D INS SYRINGE 0.5CC/30GX1/2\") 30G X 1/2\" 0.5 ML MISC 1 each by Does not apply route 2 times daily 100 each 2    pantoprazole (PROTONIX) 40 MG tablet TAKE 1 TABLET DAILY 90 tablet 4    insulin NPH (HUMULIN N;NOVOLIN N) 100 UNIT/ML injection vial Inject 28 Units into the skin every morning Can go up to 30 units if needed 2 vial 3    blood glucose test strips (ASCENSIA AUTODISC VI;ONE TOUCH ULTRA TEST VI) strip Use with associated glucose meter: Free style Lite, test  strip 11    amLODIPine (NORVASC) 10 MG tablet TAKE 1 TABLET DAILY 30 tablet 3    acetaminophen (TYLENOL) 500 MG tablet Take 2 tablets by mouth every 6 hours as needed. 120 tablet 3    aspirin 325 MG tablet Take 325 mg by mouth daily.  nitroGLYCERIN (NITRODUR) 0.2 MG/HR Apply 1 Patch Onto The Skin Daily, 12 Hours On, 12 Hours Off (Patient not taking: Reported on 10/19/2021) 90 patch 1    anastrozole (ARIMIDEX) 1 MG tablet Take 1 tablet by mouth daily (Patient not taking: Reported on 10/19/2021)      esomeprazole (NEXIUM) 40 MG delayed release capsule Take 1 capsule by mouth daily (Patient not taking: Reported on 10/19/2021) 90 capsule 1    diclofenac sodium (VOLTAREN) 1 % GEL Apply 2 g topically 4 times daily as needed for Pain For upper extremity, use 2 gm QID. For lower extremity, use 4 gm QID. (Patient not taking: Reported on 10/19/2021) 100 g 11    docusate sodium (COLACE) 100 MG capsule Take 1 capsule by mouth 2 times daily as needed for Constipation (Patient not taking: Reported on 10/19/2021) 30 capsule 1    mometasone (ELOCON) 0.1 % cream Apply topically 2 times daily as needed Apply topically daily. (Patient not taking: Reported on 10/19/2021)       No current facility-administered medications for this visit.      No Known Allergies    Health Maintenance   Topic Date Due    Shingles Vaccine (1 of 2) Never done    TSH testing  07/10/2018    Lipid screen  01/24/2020    Potassium monitoring  09/27/2020    Creatinine monitoring  09/27/2020    Flu vaccine (1) 09/01/2021    Annual Wellness Visit (AWV)  04/27/2022    DTaP/Tdap/Td vaccine (2 - Td or Tdap) 04/13/2026    Pneumococcal 65+ years Vaccine  Completed    COVID-19 Vaccine  Completed    Hepatitis A vaccine  Aged Out    Hib vaccine  Aged Out    Meningococcal (ACWY) vaccine  Aged Out Subjective:      Review of Systems   Respiratory: Negative. Cardiovascular: Negative. Objective:     BP (!) 148/72   Pulse 74   Wt 132 lb 3.2 oz (60 kg)   SpO2 98%   BMI 24.98 kg/m²   Physical Exam  Vitals and nursing note reviewed. Constitutional:       General: She is not in acute distress. Appearance: She is well-developed. She is not ill-appearing. HENT:      Head: Normocephalic and atraumatic. Right Ear: External ear normal.      Left Ear: External ear normal.   Eyes:      General: No scleral icterus. Right eye: No discharge. Left eye: No discharge. Conjunctiva/sclera: Conjunctivae normal.   Neck:      Thyroid: No thyromegaly. Trachea: No tracheal deviation. Cardiovascular:      Rate and Rhythm: Normal rate and regular rhythm. Heart sounds: Normal heart sounds. Pulmonary:      Effort: Pulmonary effort is normal. No respiratory distress. Breath sounds: Normal breath sounds. No wheezing. Abdominal:      General: Bowel sounds are normal. There is no distension. Palpations: Abdomen is soft. Tenderness: There is no abdominal tenderness. Comments: Suprapubic catheter  Examined while sitting in the chair. Musculoskeletal:      Comments: Severe OA changes right knee, ankles enlarged: Looks like synovial swelling/arthritis in the ankles. Patient uses a walker. No pitting edema   Lymphadenopathy:      Cervical: No cervical adenopathy. Skin:     General: Skin is warm. Findings: No rash. Neurological:      Mental Status: She is alert and oriented to person, place, and time. Psychiatric:         Mood and Affect: Mood normal.         Behavior: Behavior normal.         Thought Content: Thought content normal.         Assessment:       Diagnosis Orders   1. Constipation, unspecified constipation type  XR ABDOMEN (KUB) (SINGLE AP VIEW)   2. Essential hypertension     3.  Need for vaccination  INFLUENZA, QUADV, ADJUVANTED, 65 YRS =, IM, PF, PREFILL SYR, 0.5ML (FLUAD)   4. Type 2 diabetes mellitus without complication, with long-term current use of insulin ([facility]          Plan:      Return in about 4 months (around 2/19/2022) for diabetes mellitus. Continues fiber 1-3 times a day for the bowel movements. She would be high risk for any kind of rectal prolapse surgery  Orders Placed This Encounter   Procedures    XR ABDOMEN (KUB) (SINGLE AP VIEW)     Standing Status:   Future     Standing Expiration Date:   10/19/2022    INFLUENZA, QUADV, ADJUVANTED, 72 YRS =, IM, PF, PREFILL SYR, 0.5ML (FLUAD)     Orders Placed This Encounter   Medications    nitroGLYCERIN (NITROSTAT) 0.4 MG SL tablet     Sig: Place 1 tablet under the tongue every 5 minutes as needed for Chest pain up to max of 3 total doses. If no relief after 1 dose, call 911. Dispense:  25 tablet     Refill:  1       Patient given educationalmaterials - see patient instructions. Discussed use, benefit, and side effectsof prescribed medications. All patient questions answered. Pt voiced understanding. Reviewed health maintenance. Instructed to continue current medications, diet . Patient agreed with treatment plan. Follow up as directed. Dear Dr Chris Morrison: the above patient is moderate medical  risk for flexible sigmoidoscopy. She may be off plavix 5 days prior to surgery.      Electronicallysigned by Juanpablo Bloom MD on 10/19/2021 at 3:20 PM

## 2021-11-04 RX ORDER — HYDRALAZINE HYDROCHLORIDE 50 MG/1
TABLET, FILM COATED ORAL
Qty: 270 TABLET | Refills: 3 | Status: SHIPPED | OUTPATIENT
Start: 2021-11-04

## 2021-12-21 ENCOUNTER — IMMUNIZATION (OUTPATIENT)
Dept: PRIMARY CARE CLINIC | Age: 86
End: 2021-12-21
Payer: MEDICARE

## 2021-12-21 ENCOUNTER — OFFICE VISIT (OUTPATIENT)
Dept: PRIMARY CARE CLINIC | Age: 86
End: 2021-12-21
Payer: MEDICARE

## 2021-12-21 VITALS — HEART RATE: 73 BPM | SYSTOLIC BLOOD PRESSURE: 138 MMHG | OXYGEN SATURATION: 96 % | DIASTOLIC BLOOD PRESSURE: 66 MMHG

## 2021-12-21 DIAGNOSIS — Z23 NEED FOR VACCINATION: Primary | ICD-10-CM

## 2021-12-21 DIAGNOSIS — M25.561 ACUTE PAIN OF RIGHT KNEE: Primary | ICD-10-CM

## 2021-12-21 DIAGNOSIS — R22.0 LOCALIZED SWELLING, MASS AND LUMP, HEAD: ICD-10-CM

## 2021-12-21 PROCEDURE — G8420 CALC BMI NORM PARAMETERS: HCPCS | Performed by: PHYSICIAN ASSISTANT

## 2021-12-21 PROCEDURE — 91300 COVID-19, PFIZER VACCINE 30MCG/0.3ML DOSE: CPT | Performed by: PHYSICIAN ASSISTANT

## 2021-12-21 PROCEDURE — 99214 OFFICE O/P EST MOD 30 MIN: CPT | Performed by: PHYSICIAN ASSISTANT

## 2021-12-21 PROCEDURE — 4040F PNEUMOC VAC/ADMIN/RCVD: CPT | Performed by: PHYSICIAN ASSISTANT

## 2021-12-21 PROCEDURE — G8484 FLU IMMUNIZE NO ADMIN: HCPCS | Performed by: PHYSICIAN ASSISTANT

## 2021-12-21 PROCEDURE — 1036F TOBACCO NON-USER: CPT | Performed by: PHYSICIAN ASSISTANT

## 2021-12-21 PROCEDURE — 1090F PRES/ABSN URINE INCON ASSESS: CPT | Performed by: PHYSICIAN ASSISTANT

## 2021-12-21 PROCEDURE — 1123F ACP DISCUSS/DSCN MKR DOCD: CPT | Performed by: PHYSICIAN ASSISTANT

## 2021-12-21 PROCEDURE — 0004A COVID-19, PFIZER VACCINE 30MCG/0.3ML DOSE: CPT | Performed by: PHYSICIAN ASSISTANT

## 2021-12-21 PROCEDURE — G8427 DOCREV CUR MEDS BY ELIG CLIN: HCPCS | Performed by: PHYSICIAN ASSISTANT

## 2021-12-21 RX ORDER — PREDNISONE 20 MG/1
20 TABLET ORAL 2 TIMES DAILY
Qty: 10 TABLET | Refills: 0 | Status: SHIPPED | OUTPATIENT
Start: 2021-12-21 | End: 2021-12-26

## 2021-12-21 ASSESSMENT — ENCOUNTER SYMPTOMS
SINUS PRESSURE: 0
ABDOMINAL DISTENTION: 0
VOMITING: 0
ABDOMINAL PAIN: 0
CHEST TIGHTNESS: 0
COUGH: 0
SORE THROAT: 0
CONSTIPATION: 0
PHOTOPHOBIA: 0
RHINORRHEA: 0
SHORTNESS OF BREATH: 0
DIARRHEA: 0
EYE DISCHARGE: 0

## 2021-12-21 NOTE — PROGRESS NOTES
After obtaining consent, and per orders of Kj Turcios , injection of [employer] given in Left deltoid by Patti Desai MA. Patient instructed to remain in clinic for 15 mins minutes afterwards, and to report any adverse reaction to me immediately. Patient tolerated the vaccine well.

## 2021-12-21 NOTE — PROGRESS NOTES
[address] [place] [address] PRIMARY CARE  [address] [facility] [address]  Dept: [address] [facility] is a 80 y.o. female Established patient, who presents today for her medical conditions/complaints as noted below. Chief Complaint   Patient presents with    Joint Pain    Knee Pain     Right knee pain and edema x 4 days        HPI:     HPI: The patient is having trouble with weight bearing since last Thursday. No trauma. It is swollen. Has tried ice, CBD, motrin, tylenol. Her sugar has been running normal.  Patient has been having troubles getting around with walker. The wheelchair she has is too heavy for her to take around. Patient would like custom lighter wheelchair. Reviewed prior notes None  Reviewed previous Labs    LDL Cholesterol (mg/dL)   Date Value   12/01/2014 48     LDL Calculated (mg/dL)   Date Value   01/24/2019 46   07/10/2017 44       (goal LDL is <100)   AST (U/L)   Date Value   08/01/2019 20     ALT (U/L)   Date Value   08/01/2019 13     BUN (mg/dL)   Date Value   09/27/2019 16     Hemoglobin A1C (%)   Date Value   10/19/2021 6.7     BP Readings from Last 3 Encounters:   12/21/21 138/66   10/19/21 (!) 146/68   06/14/21 138/64          (goal 120/80)    Past Medical History:   Diagnosis Date    Acute MI ([address]     Arthritis     RIGHT  KNEE    CAD (coronary artery disease)     Carcinoma of endometrium ([address] 9/25/2017    Cervical cancer ([address]     Hysterectomy    Convulsions ([address] 10/23/2015    Full dentures     Upper only    GERD (gastroesophageal reflux disease)     History of congestive heart disease     [place] (hard of hearing)     Hyperlipidemia     Hypertension     Presence of indwelling Bourgeois catheter     Prolonged emergence from general anesthesia     S/P CABG x 3 1996    Seizure disorder ([address] 10/23/2015    From stroke, on Dilantin. PATIENT STATES SEIZURE WAS IN 2008. ONLY HAD 1 SEIZURE.     Thyroid disease     Type II or unspecified type diabetes mellitus without mention of complication, not stated as uncontrolled     on Insulin & Metformin    Unspecified cerebral artery occlusion with cerebral infarction 1998    Slight Lt Arm and Leg Residual Numbess    Uses roller walker     Wears glasses       Past Surgical History:   Procedure Laterality Date    CARDIAC SURGERY  1996    CABG X 3 BYPASS    CAROTID ENDARTERECTOMY Right     CHOLECYSTECTOMY      COLONOSCOPY  about 2 years ago   [address]    3 vls    CYSTOSCOPY  08/31/2018    CYSTO, SP TUBE PLACEMENT    CYSTOSCOPY W BIOPSY OF BLADDER N/A 8/5/2019    CYSTOSCOPY, BLADDER BIOPSY AND FULGURATION performed by Sonia Holliday MD at [address] Bilateral 2017    CATARACTS WITH IOL PLACED    HYSTERECTOMY      [place] CYSTOURETHROSCOPY N/A 7/27/2018    [facility] N/A 7/27/2018    VIDEO  URODYNAMICS performed by Jorgito Cervantes MD at [address] OFFICE/OUTPT [address] [facility] N/A 8/31/2018    CYSTO, SP TUBE PLACEMENT performed by Jorgito Cervantes MD at [address] W/O ECP Left 8/22/2017    EYE CATARACT EMULSIFICATION IOL IMPLANT performed by Iraida Ferguson MD at [address] W/O ECP Right 9/5/2017    EYE CATARACT EMULSIFICATION IOL IMPLANT performed by Iraida Ferguson MD at [address] [place] [address]         Family History   Problem Relation Age of Onset    Diabetes Father     Heart Attack Father     Heart Disease Father     Heart Attack Sister     High Blood Pressure Mother     Heart Attack Brother     No Known Problems Maternal Grandmother     No Known Problems Maternal Grandfather     No Known Problems Paternal Grandmother     No Known Problems Paternal Grandfather     High Blood Pressure Daughter     Diabetes Daughter     No Known Problems Son        Social History Tobacco Use    Smoking status: Never Smoker    Smokeless tobacco: Never Used   Substance Use Topics    Alcohol use: No      Current Outpatient Medications   Medication Sig Dispense Refill    predniSONE (DELTASONE) 20 MG tablet Take 1 tablet by mouth 2 times daily for 5 days 10 tablet 0    hydrALAZINE (APRESOLINE) 50 MG tablet TAKE 1 TABLET EVERY 8 HOURS 270 tablet 3    nitroGLYCERIN (NITROSTAT) 0.4 MG SL tablet Place 1 tablet under the tongue every 5 minutes as needed for Chest pain up to max of 3 total doses. If no relief after 1 dose, call 911. 25 tablet 1    losartan (COZAAR) 100 MG tablet TAKE 1 TABLET DAILY 90 tablet 3    KLOR-CON M20 20 MEQ extended release tablet TAKE 1 TABLET DAILY 90 tablet 3    metoprolol tartrate (LOPRESSOR) 50 MG tablet Take 1.5 tablets by mouth 2 times daily 270 tablet 1    phenytoin (DILANTIN) 100 MG ER capsule Take One Capsule Three Times A Day 42 capsule 0    levothyroxine (SYNTHROID) 137 MCG tablet TAKE 1 TABLET DAILY 90 tablet 3    clopidogrel (PLAVIX) 75 MG tablet TAKE 1 TABLET DAILY 90 tablet 3    FreeStyle Lancets MISC Three times daily. E11.9 100 each 5    blood glucose test strips (FREESTYLE LITE) strip TEST DAILY 100 strip 5    glucose monitoring kit (FREESTYLE) monitoring kit 1 kit by Does not apply route daily Indications: Brand per insurance coverage. 1 kit 0    metFORMIN (GLUCOPHAGE-XR) 500 MG extended release tablet TAKE 1 TABLET DAILY WITH SUPPER 90 tablet 3    furosemide (LASIX) 40 MG tablet TAKE 1 TABLET DAILY 90 tablet 3    atorvastatin (LIPITOR) 40 MG tablet TAKE 1 TABLET DAILY 90 tablet 3    Misc.  Devices (ROLLER WALKER) MISC 1 each by Does not apply route continuous 1 each 0    Insulin Syringe-Needle U-100 (B-D INS SYRINGE 0.5CC/30GX1/2\") 30G X 1/2\" 0.5 ML MISC 1 each by Does not apply route 2 times daily 100 each 2    pantoprazole (PROTONIX) 40 MG tablet TAKE 1 TABLET DAILY 90 tablet 4    insulin NPH (HUMULIN N;NOVOLIN N) 100 UNIT/ML injection vial Inject 28 Units into the skin every morning Can go up to 30 units if needed 2 vial 3    blood glucose test strips (ASCENSIA AUTODISC VI;ONE TOUCH ULTRA TEST VI) strip Use with associated glucose meter: Free style Lite, test  strip 11    diclofenac sodium (VOLTAREN) 1 % GEL Apply 2 g topically 4 times daily as needed for Pain For upper extremity, use 2 gm QID. For lower extremity, use 4 gm QID. 100 g 11    amLODIPine (NORVASC) 10 MG tablet TAKE 1 TABLET DAILY 30 tablet 3    acetaminophen (TYLENOL) 500 MG tablet Take 2 tablets by mouth every 6 hours as needed. 120 tablet 3    aspirin 325 MG tablet Take 325 mg by mouth daily.  nitroGLYCERIN (NITRODUR) 0.2 MG/HR Apply 1 Patch Onto The Skin Daily, 12 Hours On, 12 Hours Off (Patient not taking: Reported on 10/19/2021) 90 patch 1    anastrozole (ARIMIDEX) 1 MG tablet Take 1 tablet by mouth daily (Patient not taking: Reported on 10/19/2021)      esomeprazole (NEXIUM) 40 MG delayed release capsule Take 1 capsule by mouth daily (Patient not taking: Reported on 10/19/2021) 90 capsule 1    docusate sodium (COLACE) 100 MG capsule Take 1 capsule by mouth 2 times daily as needed for Constipation (Patient not taking: Reported on 10/19/2021) 30 capsule 1    mometasone (ELOCON) 0.1 % cream Apply topically 2 times daily as needed Apply topically daily. (Patient not taking: Reported on 10/19/2021)       No current facility-administered medications for this visit.      No Known Allergies    Health Maintenance   Topic Date Due    Shingles Vaccine (1 of 2) Never done    TSH testing  07/10/2018    Lipid screen  01/24/2020    Potassium monitoring  09/27/2020    Creatinine monitoring  09/27/2020    COVID-19 Vaccine (3 - Booster for Moderna series) 08/18/2021    Annual Wellness Visit (AWV)  04/27/2022    DTaP/Tdap/Td vaccine (2 - Td or Tdap) 04/13/2026    Flu vaccine  Completed    Pneumococcal 65+ years Vaccine  Completed    Hepatitis A vaccine Aged Out    Hib vaccine  Aged Out    Meningococcal (ACWY) vaccine  Aged Out       Subjective:      Review of Systems   Constitutional: Negative for chills, fever and unexpected weight change. HENT: Negative for congestion, hearing loss, rhinorrhea, sinus pressure and sore throat. Eyes: Negative for photophobia, discharge and visual disturbance. Respiratory: Negative for cough, chest tightness and shortness of breath. Cardiovascular: Positive for leg swelling (right knee ). Negative for chest pain and palpitations. Gastrointestinal: Negative for abdominal distention, abdominal pain, constipation, diarrhea and vomiting. Endocrine: Negative for polydipsia and polyuria. Genitourinary: Negative for decreased urine volume, difficulty urinating, frequency and urgency. Musculoskeletal: Positive for arthralgias and gait problem. Negative for myalgias. Skin: Negative for rash. Allergic/Immunologic: Negative for food allergies. Neurological: Negative for dizziness, weakness, numbness and headaches. Hematological: Negative for adenopathy. Psychiatric/Behavioral: Negative for dysphoric mood and sleep disturbance. The patient is not nervous/anxious. Objective:     /66   Pulse 73   SpO2 96%   Physical Exam  Constitutional:       General: She is not in acute distress. Appearance: Normal appearance. She is not ill-appearing. HENT:      Head: Normocephalic and atraumatic. Right Ear: External ear normal.      Left Ear: External ear normal.      Nose: Nose normal.      Mouth/Throat:      Mouth: Mucous membranes are moist.   Eyes:      Extraocular Movements: Extraocular movements intact. Conjunctiva/sclera: Conjunctivae normal.      Pupils: Pupils are equal, round, and reactive to light. Neck:      Vascular: No carotid bruit. Cardiovascular:      Rate and Rhythm: Normal rate and regular rhythm. Pulses: Normal pulses. Heart sounds: Normal heart sounds. Pulmonary:      Effort: Pulmonary effort is normal. No respiratory distress. Breath sounds: Normal breath sounds. Abdominal:      General: Bowel sounds are normal. There is no distension. Tenderness: There is no abdominal tenderness. Musculoskeletal:         General: Normal range of motion. Cervical back: Normal range of motion and neck supple. Lymphadenopathy:      Cervical: No cervical adenopathy. Skin:     General: Skin is warm and dry. Neurological:      General: No focal deficit present. Mental Status: She is alert and oriented to person, place, and time. Psychiatric:         Mood and Affect: Mood normal.         Behavior: Behavior normal.         Thought Content: Thought content normal.         Assessment and Plan:          1. Acute pain of right knee  -     XR KNEE RIGHT (3 VIEWS); Future  -     predniSONE (DELTASONE) 20 MG tablet; Take 1 tablet by mouth 2 times daily for 5 days, Disp-10 tablet, R-0Normal  -     US DUP LOWER EXTREMITY RIGHT LUIS ANGEL; Future  2. Localized swelling, mass and lump, head   -     US DUP LOWER EXTREMITY RIGHT LUIS ANGEL; Future             Patient given educational materials - see patient instructions. Discussed use, benefit, and side effects of prescribed medications. All patient questions answered. Pt voiced understanding. Reviewed health maintenance. Instructed to continue current medications, diet and exercise. Patient agreed with treatment plan. Follow up as directed.      Electronically signed by CANDICE Higgins on 12/21/2021 at 2:47 PM

## 2022-01-07 ENCOUNTER — TELEPHONE (OUTPATIENT)
Dept: UROLOGY | Age: 87
End: 2022-01-07

## 2022-01-07 ENCOUNTER — TELEPHONE (OUTPATIENT)
Dept: PRIMARY CARE CLINIC | Age: 87
End: 2022-01-07

## 2022-01-07 DIAGNOSIS — I10 ESSENTIAL HYPERTENSION: ICD-10-CM

## 2022-01-07 RX ORDER — METOPROLOL TARTRATE 50 MG/1
75 TABLET, FILM COATED ORAL 3 TIMES DAILY
Qty: 405 TABLET | Refills: 0
Start: 2022-01-07 | End: 2022-04-11 | Stop reason: SDUPTHER

## 2022-01-07 NOTE — TELEPHONE ENCOUNTER
Spoke to pt. She will have her daughter check her bp when she gets home from picking up her rx's and let us know.

## 2022-01-07 NOTE — TELEPHONE ENCOUNTER
Coordinator states patient has SP and is changed routinely by home health. [facility] needs to know if we are still seeing patient and will follow care for SP. Writer did call coordinator and let her know we are still seeing patient and will verify with Dr. Damion Coker about following of care. Nat Delaney verbalized understanding and call was ended.

## 2022-01-07 NOTE — TELEPHONE ENCOUNTER
Pt notified. Instructions given. Will call back when she needs a rx. Also let Bernadette Kayser know.  JACKSON

## 2022-01-07 NOTE — TELEPHONE ENCOUNTER
Akin Gomez from [facility] calling. Saw pt yesterday and her BP was 199/98. Took it 5 times and the lowest it was, 190/95, just before she left. Was not able to remove her catheter. Tried deflating it, but it was stuck. Advised pt go to BP er, which pt did. See encounter. FYI. [phone], if question/concerns.

## 2022-01-07 NOTE — TELEPHONE ENCOUNTER
Call patient and family. If her home BP is still elevated then increase the metoprolol 75 mg to 3 times a day instead of just BID.

## 2022-01-07 NOTE — TELEPHONE ENCOUNTER
Increase metoproloo to 1.5 tabs three times a day. Does she want a new script sent in and which pharmacy ?

## 2022-01-10 ENCOUNTER — HOSPITAL ENCOUNTER (OUTPATIENT)
Dept: GENERAL RADIOLOGY | Age: 87
Discharge: HOME | End: 2022-01-12
Payer: MEDICARE

## 2022-01-10 ENCOUNTER — HOSPITAL ENCOUNTER (OUTPATIENT)
Dept: VASCULAR LAB | Age: 87
Discharge: HOME | End: 2022-01-10
Payer: MEDICARE

## 2022-01-10 ENCOUNTER — HOSPITAL ENCOUNTER (OUTPATIENT)
Age: 87
Discharge: HOME | End: 2022-01-12
Payer: MEDICARE

## 2022-01-10 DIAGNOSIS — M25.561 ACUTE PAIN OF RIGHT KNEE: ICD-10-CM

## 2022-01-10 DIAGNOSIS — R22.0 LOCALIZED SWELLING, MASS AND LUMP, HEAD: ICD-10-CM

## 2022-01-10 PROCEDURE — 73562 X-RAY EXAM OF KNEE 3: CPT

## 2022-01-10 PROCEDURE — 93971 EXTREMITY STUDY: CPT

## 2022-01-10 NOTE — RESULT ENCOUNTER NOTE
Call and advise patient that the results showed bone on bone arthritis.  Okay for ortho re referral if pain persist.

## 2022-01-19 ENCOUNTER — TELEPHONE (OUTPATIENT)
Dept: PRIMARY CARE CLINIC | Age: 87
End: 2022-01-19

## 2022-01-19 NOTE — TELEPHONE ENCOUNTER
Received phone call from Aden Guerrero with [facility] care. Aden Guerrero states she will be doing re- certification on patient next Thursday for catheter changes every 3 weeks.        MEGAN

## 2022-02-21 DIAGNOSIS — E11.9 TYPE 2 DIABETES MELLITUS WITHOUT COMPLICATION, WITH LONG-TERM CURRENT USE OF INSULIN (HCC): ICD-10-CM

## 2022-02-21 DIAGNOSIS — Z79.4 TYPE 2 DIABETES MELLITUS WITHOUT COMPLICATION, WITH LONG-TERM CURRENT USE OF INSULIN (HCC): ICD-10-CM

## 2022-02-21 RX ORDER — METFORMIN HYDROCHLORIDE 500 MG/1
TABLET, EXTENDED RELEASE ORAL
Qty: 90 TABLET | Refills: 3 | Status: SHIPPED | OUTPATIENT
Start: 2022-02-21

## 2022-02-21 RX ORDER — FUROSEMIDE 40 MG/1
TABLET ORAL
Qty: 90 TABLET | Refills: 3 | Status: SHIPPED | OUTPATIENT
Start: 2022-02-21

## 2022-03-02 ENCOUNTER — TELEPHONE (OUTPATIENT)
Dept: UROLOGY | Age: 87
End: 2022-03-02

## 2022-03-08 ENCOUNTER — OFFICE VISIT (OUTPATIENT)
Dept: PRIMARY CARE CLINIC | Age: 87
End: 2022-03-08
Payer: MEDICARE

## 2022-03-08 VITALS
BODY MASS INDEX: 25.19 KG/M2 | OXYGEN SATURATION: 99 % | WEIGHT: 133.4 LBS | DIASTOLIC BLOOD PRESSURE: 64 MMHG | HEART RATE: 67 BPM | HEIGHT: 61 IN | SYSTOLIC BLOOD PRESSURE: 142 MMHG

## 2022-03-08 DIAGNOSIS — Z93.59 SUPRAPUBIC CATHETER (HCC): ICD-10-CM

## 2022-03-08 DIAGNOSIS — Z79.4 TYPE 2 DIABETES MELLITUS WITHOUT COMPLICATION, WITH LONG-TERM CURRENT USE OF INSULIN (HCC): ICD-10-CM

## 2022-03-08 DIAGNOSIS — I10 ESSENTIAL HYPERTENSION: Primary | ICD-10-CM

## 2022-03-08 DIAGNOSIS — M79.672 LEFT FOOT PAIN: ICD-10-CM

## 2022-03-08 DIAGNOSIS — E11.9 TYPE 2 DIABETES MELLITUS WITHOUT COMPLICATION, WITH LONG-TERM CURRENT USE OF INSULIN (HCC): ICD-10-CM

## 2022-03-08 DIAGNOSIS — R56.9 SEIZURE (HCC): Chronic | ICD-10-CM

## 2022-03-08 LAB — HBA1C MFR BLD: 6.6 %

## 2022-03-08 PROCEDURE — 83036 HEMOGLOBIN GLYCOSYLATED A1C: CPT | Performed by: FAMILY MEDICINE

## 2022-03-08 PROCEDURE — G8427 DOCREV CUR MEDS BY ELIG CLIN: HCPCS | Performed by: FAMILY MEDICINE

## 2022-03-08 PROCEDURE — G8484 FLU IMMUNIZE NO ADMIN: HCPCS | Performed by: FAMILY MEDICINE

## 2022-03-08 PROCEDURE — 1090F PRES/ABSN URINE INCON ASSESS: CPT | Performed by: FAMILY MEDICINE

## 2022-03-08 PROCEDURE — G8417 CALC BMI ABV UP PARAM F/U: HCPCS | Performed by: FAMILY MEDICINE

## 2022-03-08 PROCEDURE — 1123F ACP DISCUSS/DSCN MKR DOCD: CPT | Performed by: FAMILY MEDICINE

## 2022-03-08 PROCEDURE — 99213 OFFICE O/P EST LOW 20 MIN: CPT | Performed by: FAMILY MEDICINE

## 2022-03-08 PROCEDURE — 1036F TOBACCO NON-USER: CPT | Performed by: FAMILY MEDICINE

## 2022-03-08 PROCEDURE — 4040F PNEUMOC VAC/ADMIN/RCVD: CPT | Performed by: FAMILY MEDICINE

## 2022-03-08 RX ORDER — TROSPIUM CHLORIDE 20 MG/1
20 TABLET, FILM COATED ORAL 2 TIMES DAILY
COMMUNITY
End: 2022-03-08

## 2022-03-08 RX ORDER — OXYBUTYNIN CHLORIDE 10 MG/1
10 TABLET, EXTENDED RELEASE ORAL DAILY
COMMUNITY
End: 2022-03-08

## 2022-03-08 ASSESSMENT — ENCOUNTER SYMPTOMS: RESPIRATORY NEGATIVE: 1

## 2022-03-08 NOTE — PROGRESS NOTES
[facility] PRIMARY CARE  [address] [facility] [address]  Dept: [address] [facility] is a 80 y.o. female Established patient, who presents today for her medical conditions/complaintsas noted below. Chief Complaint   Patient presents with    Foot Swelling     Pt has bilateral foot swelling and black toenails daugghter noticed them about 2 weeks ago. HPI:     HPI     Ankles swollen comes and goes , worse usually. Left toenails turned black 2 weeks ago  She denies any injury. Sugars are sometimes high  Here with daughter     Reviewed prior notes None  Reviewed previous Labs    LDL Cholesterol (mg/dL)   Date Value   12/01/2014 48     LDL Calculated (mg/dL)   Date Value   01/24/2019 46   07/10/2017 44       (goal LDL is <100)   AST (U/L)   Date Value   08/01/2019 20     ALT (U/L)   Date Value   08/01/2019 13     BUN (mg/dL)   Date Value   09/27/2019 16     Hemoglobin A1C (%)   Date Value   10/19/2021 6.7     BP Readings from Last 3 Encounters:   03/08/22 (!) 144/64   12/21/21 138/66   10/19/21 (!) 146/68          (goal 120/80)    Past Medical History:   Diagnosis Date    Acute MI ([address]     Arthritis     RIGHT  KNEE    CAD (coronary artery disease)     Carcinoma of endometrium ([address] 9/25/2017    Cervical cancer ([address]     Hysterectomy    Convulsions ([address] 10/23/2015    Full dentures     Upper only    GERD (gastroesophageal reflux disease)     History of congestive heart disease     [place] (hard of hearing)     Hyperlipidemia     Hypertension     Presence of indwelling Bourgeois catheter     Prolonged emergence from general anesthesia     S/P CABG x 3 1996    Seizure disorder ([address] 10/23/2015    From stroke, on Dilantin. PATIENT STATES SEIZURE WAS IN 2008. ONLY HAD 1 SEIZURE.     Thyroid disease     Type II or unspecified type diabetes mellitus without mention of complication, not stated as uncontrolled     on Insulin & Metformin  Unspecified cerebral artery occlusion with cerebral infarction 1998    Slight Lt Arm and Leg Residual Numbess    Uses roller walker     Wears glasses       Past Surgical History:   Procedure Laterality Date    CARDIAC SURGERY  1996    CABG X 3 BYPASS    CAROTID ENDARTERECTOMY Right     CHOLECYSTECTOMY      COLONOSCOPY  about 2 years ago   [address]    3 vls    CYSTOSCOPY  08/31/2018    CYSTO, SP TUBE PLACEMENT    CYSTOSCOPY W BIOPSY OF BLADDER N/A 8/5/2019    CYSTOSCOPY, BLADDER BIOPSY AND FULGURATION performed by Jordy Salmeron MD at [address] Bilateral 2017    CATARACTS WITH IOL PLACED    HYSTERECTOMY      MI CYSTOURETHROSCOPY N/A 7/27/2018    CYSTOSCOPY performed by Libby Grove MD at [facility] N/A 7/27/2018    VIDEO  URODYNAMICS performed by Libby Grove MD at [address] [place] [address] OFFICE/OUTPT [facility] N/A 8/31/2018    CYSTO, SP TUBE PLACEMENT performed by Libby Grove MD at [address] W/O ECP Left 8/22/2017    EYE CATARACT EMULSIFICATION IOL IMPLANT performed by Yovany Peña MD at [address] W/O ECP Right 9/5/2017    EYE CATARACT EMULSIFICATION IOL IMPLANT performed by Yovany Peña MD at [facility]         Family History   Problem Relation Age of Onset    Diabetes Father     Heart Attack Father     Heart Disease Father     Heart Attack Sister     High Blood Pressure Mother     Heart Attack Brother     No Known Problems Maternal Grandmother     No Known Problems Maternal Grandfather     No Known Problems Paternal Grandmother     No Known Problems Paternal Grandfather     High Blood Pressure Daughter     Diabetes Daughter     No Known Problems Son        Social History     Tobacco Use    Smoking status: Never Smoker    Smokeless tobacco: Never Used   Substance Use Topics    Alcohol use:  No Current Outpatient Medications   Medication Sig Dispense Refill    furosemide (LASIX) 40 MG tablet TAKE 1 TABLET DAILY 90 tablet 3    metFORMIN (GLUCOPHAGE-XR) 500 MG extended release tablet TAKE 1 TABLET DAILY WITH SUPPER 90 tablet 3    metoprolol tartrate (LOPRESSOR) 50 MG tablet Take 1.5 tablets by mouth 3 times daily 405 tablet 0    hydrALAZINE (APRESOLINE) 50 MG tablet TAKE 1 TABLET EVERY 8 HOURS 270 tablet 3    nitroGLYCERIN (NITROSTAT) 0.4 MG SL tablet Place 1 tablet under the tongue every 5 minutes as needed for Chest pain up to max of 3 total doses. If no relief after 1 dose, call 911. 25 tablet 1    losartan (COZAAR) 100 MG tablet TAKE 1 TABLET DAILY 90 tablet 3    KLOR-CON M20 20 MEQ extended release tablet TAKE 1 TABLET DAILY 90 tablet 3    phenytoin (DILANTIN) 100 MG ER capsule Take One Capsule Three Times A Day 42 capsule 0    levothyroxine (SYNTHROID) 137 MCG tablet TAKE 1 TABLET DAILY 90 tablet 3    clopidogrel (PLAVIX) 75 MG tablet TAKE 1 TABLET DAILY 90 tablet 3    FreeStyle Lancets MISC Three times daily. E11.9 100 each 5    blood glucose test strips (FREESTYLE LITE) strip TEST DAILY 100 strip 5    glucose monitoring kit (FREESTYLE) monitoring kit 1 kit by Does not apply route daily Indications: Brand per insurance coverage. 1 kit 0    atorvastatin (LIPITOR) 40 MG tablet TAKE 1 TABLET DAILY 90 tablet 3    esomeprazole (NEXIUM) 40 MG delayed release capsule Take 1 capsule by mouth daily 90 capsule 1    Misc.  Devices (ROLLER WALKER) MISC 1 each by Does not apply route continuous 1 each 0    Insulin Syringe-Needle U-100 (B-D INS SYRINGE 0.5CC/30GX1/2\") 30G X 1/2\" 0.5 ML MISC 1 each by Does not apply route 2 times daily 100 each 2    pantoprazole (PROTONIX) 40 MG tablet TAKE 1 TABLET DAILY 90 tablet 4    insulin NPH (HUMULIN N;NOVOLIN N) 100 UNIT/ML injection vial Inject 28 Units into the skin every morning Can go up to 30 units if needed 2 vial 3    blood glucose test strips (ASCENSIA AUTODISC VI;ONE TOUCH ULTRA TEST VI) strip Use with associated glucose meter: Free style Lite, test  strip 11    diclofenac sodium (VOLTAREN) 1 % GEL Apply 2 g topically 4 times daily as needed for Pain For upper extremity, use 2 gm QID. For lower extremity, use 4 gm QID. 100 g 11    amLODIPine (NORVASC) 10 MG tablet TAKE 1 TABLET DAILY 30 tablet 3    docusate sodium (COLACE) 100 MG capsule Take 1 capsule by mouth 2 times daily as needed for Constipation 30 capsule 1    mometasone (ELOCON) 0.1 % cream Apply topically 2 times daily as needed Indications: apply and gently massage into affected area twice daily Apply topically daily.  acetaminophen (TYLENOL) 500 MG tablet Take 2 tablets by mouth every 6 hours as needed. 120 tablet 3    aspirin 325 MG tablet Take 325 mg by mouth daily.  nitroGLYCERIN (NITRODUR) 0.2 MG/HR Apply 1 Patch Onto The Skin Daily, 12 Hours On, 12 Hours Off (Patient not taking: Reported on 10/19/2021) 90 patch 1    anastrozole (ARIMIDEX) 1 MG tablet Take 1 tablet by mouth daily (Patient not taking: Reported on 10/19/2021)       No current facility-administered medications for this visit. No Known Allergies    Health Maintenance   Topic Date Due    Shingles Vaccine (1 of 2) Never done    TSH testing  07/10/2018    Lipid screen  01/24/2020    Potassium monitoring  09/27/2020    Creatinine monitoring  09/27/2020    Depression Screen  04/26/2022    Annual Wellness Visit (AWV)  04/27/2022    DTaP/Tdap/Td vaccine (2 - Td or Tdap) 04/13/2026    Flu vaccine  Completed    Pneumococcal 65+ years Vaccine  Completed    COVID-19 Vaccine  Completed    Hepatitis A vaccine  Aged Out    Hib vaccine  Aged Out    Meningococcal (ACWY) vaccine  Aged Out       Subjective:      Review of Systems   Respiratory: Negative. Musculoskeletal: Positive for arthralgias. Uses walker   suprapubic catheter bothers her at times, she adjusts it a lot then it leaks. Objective:     BP (!) 144/64   Pulse 67   Ht 5' 1\" (1.549 m)   Wt 133 lb 6.4 oz (60.5 kg)   SpO2 99%   BMI 25.21 kg/m²   Physical Exam  Vitals and nursing note reviewed. Constitutional:       Appearance: Normal appearance. HENT:      Head: Normocephalic and atraumatic. Cardiovascular:      Pulses:           Dorsalis pedis pulses are 0 on the right side and 0 on the left side. Posterior tibial pulses are 0 on the right side and 0 on the left side. Musculoskeletal:      Right lower leg: Edema present. Left lower leg: Edema present. Comments: Lymphedema not pitting in lower legs   Feet:      Left foot:      Toenail Condition: Left toenails are abnormally thick. Comments: Left 2-3 MTP joints tender  Left 1st toe tender at base of nail and tip of toe  Under thickened 1st nail : soft tender black ecchymosis  Under smaller nails on left some small black areas. Right foot no issues     Neurological:      Mental Status: She is alert and oriented to person, place, and time. Psychiatric:         Mood and Affect: Mood normal.         Behavior: Behavior normal.         Assessment:       Diagnosis Orders   1. Essential hypertension     2. Suprapubic catheter ([address]     3. Seizure ([address]      no seizures since 2011    4. Type 2 diabetes mellitus without complication, with long-term current use of insulin ([facility])  POCT glycosylated hemoglobin (Hb A1C)   5. Left foot pain  XR FOOT LEFT (MIN 3 VIEWS)        Plan:      No follow-ups on file. Get off bladder spasm medicines one at a time. Discussed with patient and daughter.    If black areas on the left toes is not improved after another 4 weeks then see podiatry  Orders Placed This Encounter   Procedures    XR FOOT LEFT (MIN 3 VIEWS)     Standing Status:   Future     Standing Expiration Date:   3/8/2023     Order Specific Question:   Reason for exam:     Answer:   pain     Order Specific Question:   Reason for exam:     Answer:   Pain at 2-3 rd MTP joints and the Left large toe distally    POCT glycosylated hemoglobin (Hb A1C)     No orders of the defined types were placed in this encounter. Patient given educationalmaterials - see patient instructions. Discussed use, benefit, and side effectsof prescribed medications. All patient questions answered. Pt voiced understanding. Reviewed health maintenance. Instructed to continue current medications, diet andexercise. Patient agreed with treatment plan. Follow up as directed.      Electronicallysigned by Camden Cortés MD on 3/8/2022 at 10:22 AM

## 2022-03-15 ENCOUNTER — HOSPITAL ENCOUNTER (OUTPATIENT)
Dept: GENERAL RADIOLOGY | Age: 87
Discharge: HOME | End: 2022-03-17
Payer: MEDICARE

## 2022-03-15 ENCOUNTER — HOSPITAL ENCOUNTER (OUTPATIENT)
Age: 87
Discharge: HOME | End: 2022-03-17
Payer: MEDICARE

## 2022-03-15 DIAGNOSIS — M79.672 LEFT FOOT PAIN: ICD-10-CM

## 2022-03-15 PROCEDURE — 73630 X-RAY EXAM OF FOOT: CPT

## 2022-03-31 ENCOUNTER — OFFICE VISIT (OUTPATIENT)
Dept: PODIATRY | Age: 87
End: 2022-03-31
Payer: MEDICARE

## 2022-03-31 VITALS — HEIGHT: 61 IN | BODY MASS INDEX: 25.11 KG/M2 | WEIGHT: 133 LBS

## 2022-03-31 DIAGNOSIS — E11.51 TYPE 2 DIABETES MELLITUS WITH PERIPHERAL VASCULAR DISEASE (HCC): ICD-10-CM

## 2022-03-31 DIAGNOSIS — M79.672 PAIN IN LEFT FOOT: ICD-10-CM

## 2022-03-31 DIAGNOSIS — S90.30XA CONTUSION OF DORSUM OF FOOT: ICD-10-CM

## 2022-03-31 DIAGNOSIS — B35.1 ONYCHOMYCOSIS OF TOENAIL: Primary | ICD-10-CM

## 2022-03-31 DIAGNOSIS — M79.675 PAIN OF TOES OF BOTH FEET: ICD-10-CM

## 2022-03-31 DIAGNOSIS — E11.42 DIABETIC POLYNEUROPATHY ASSOCIATED WITH TYPE 2 DIABETES MELLITUS (HCC): ICD-10-CM

## 2022-03-31 DIAGNOSIS — M79.674 PAIN OF TOES OF BOTH FEET: ICD-10-CM

## 2022-03-31 PROCEDURE — 11721 DEBRIDE NAIL 6 OR MORE: CPT | Performed by: PODIATRIST

## 2022-03-31 PROCEDURE — 99999 PR OFFICE/OUTPT VISIT,PROCEDURE ONLY: CPT | Performed by: PODIATRIST

## 2022-04-01 ASSESSMENT — ENCOUNTER SYMPTOMS
BACK PAIN: 0
DIARRHEA: 0
NAUSEA: 0
SHORTNESS OF BREATH: 0
COLOR CHANGE: 0

## 2022-04-01 NOTE — PROGRESS NOTES
SUBJECTIVE: Gissel Lainez is a 80 y.o. female who returns to the office with chief complaint of painful fungal toenails. Patient relates toe nails are thickened/difficult to trim as well as painful with ambulation and with shoe gear. Chief Complaint   Patient presents with    Nail Problem     b/l nail trim, last seem Jennifer Navarrete MD 3/8/22    Diabetes         Other     left foot xray, pcp ordered due to bruising on foot     Review of Systems   Constitutional: Negative for activity change, appetite change, chills, diaphoresis, fatigue and fever. Respiratory: Negative for shortness of breath. Cardiovascular: Negative for leg swelling. Gastrointestinal: Negative for diarrhea and nausea. Endocrine: Negative for cold intolerance, heat intolerance and polyuria. Musculoskeletal: Positive for arthralgias. Negative for back pain, gait problem, joint swelling and myalgias. Skin: Negative for color change, pallor, rash and wound. Allergic/Immunologic: Negative for environmental allergies and food allergies. Neurological: Negative for dizziness, weakness, light-headedness and numbness. Hematological: Does not bruise/bleed easily. Psychiatric/Behavioral: Negative for behavioral problems, confusion and self-injury. The patient is not nervous/anxious. OBJECTIVE: Clinical evaluation of patient reveals nails 1,2,3,4,5 of the right foot and nails 1,2,3,4,5 of the left foot to present with thickness, elongation, discoloration, brittleness, and subungual debris. There was pain with palpation and debridement of the toenails of the bilateral feet. No open lesions noted to either foot today. There is mild edema and ecchymosis to the dorsal aspect of the left forefoot. There is no induration noted to this area of the left foot. There is mild pain with palpation to this area of the left foot, but patient states that this pain has decreased over time.  There is no erythema, calor, or open wound noted to this area of the left foot. The right DP pulse is palpable. The left DP pulse is palpable. The right PT pulse is not palpable. The left PT pulse is not palpable. Protective sensation is present to the right plantar foot as noted with a 5.07 [place]aze monofilament. Protective sensation is absent to the left plantar foot as noted with a 5.07 [place]-Bl[place] monofilament. Glucose: 160 mg/dl. Class A Findings (1 needed)   [] Non-traumatic amputation of foot or integral skeleton portion thereof. [] Q7.      Class B Findings (2 needed)   1. [x] Absent posterior tibial pulse   2. [] Absent dorsalis pedis pulse   3. [x] Advanced trophic changes; three of the following are required:   ·         [x] hair growth (decrease or absence)   ·         [x] nail changes (thickening)   ·         [x] pigmentary changes (discoloration)   ·         [x] skin texture (thin, shiny)   ·         [] skin color (rubor or redness)   [x] Q8.      Class C Findings (1 Class B, 2 Class C needed)   1. [] Claudication   2. [] Temperature changes   3. [] Edema   4. [] Paresthesia   5. [] Burning   [] Q9.     ASSESSMENT:    Diagnosis Orders   1. Onychomycosis of toenail  WI DEBRIDEMENT OF NAILS, 6 OR MORE    HM DIABETES FOOT EXAM   2. Contusion of dorsum of foot     3. Pain of toes of both feet  WI DEBRIDEMENT OF NAILS, 6 OR MORE    HM DIABETES FOOT EXAM   4. Pain in left foot     5. Type 2 diabetes mellitus with peripheral vascular disease (HCC)  WI DEBRIDEMENT OF NAILS, 6 OR MORE    HM DIABETES FOOT EXAM   6. Diabetic polyneuropathy associated with type 2 diabetes mellitus (HCC)  WI DEBRIDEMENT OF NAILS, 6 OR MORE    HM DIABETES FOOT EXAM      PLAN: Toenails 1,2,3,4,5 of the right foot and 1,2,3,4,5 of the left foot were debrided in length and thickness using a nail nipper and a .  Patient informed that she sustained a contusion to the left foot, but it appears to be resolving and no treatment is needed at this time. Return in about 9 weeks (around 6/2/2022) for At risk diabetic foot care.    3/31/2022      Tuyet Guadalupe DPM

## 2022-04-04 RX ORDER — NITROGLYCERIN 40 MG/1
PATCH TRANSDERMAL
Qty: 90 PATCH | Refills: 3 | Status: SHIPPED | OUTPATIENT
Start: 2022-04-04

## 2022-04-04 RX ORDER — ATORVASTATIN CALCIUM 40 MG/1
TABLET, FILM COATED ORAL
Qty: 90 TABLET | Refills: 3 | Status: SHIPPED | OUTPATIENT
Start: 2022-04-04

## 2022-04-11 DIAGNOSIS — I10 ESSENTIAL HYPERTENSION: ICD-10-CM

## 2022-04-11 RX ORDER — METOPROLOL TARTRATE 50 MG/1
75 TABLET, FILM COATED ORAL 3 TIMES DAILY
Qty: 405 TABLET | Refills: 1 | Status: SHIPPED | OUTPATIENT
Start: 2022-04-11 | End: 2022-05-02 | Stop reason: SDUPTHER

## 2022-05-02 DIAGNOSIS — I10 ESSENTIAL HYPERTENSION: ICD-10-CM

## 2022-05-02 RX ORDER — METOPROLOL TARTRATE 50 MG/1
75 TABLET, FILM COATED ORAL 2 TIMES DAILY
Qty: 270 TABLET | Refills: 3 | Status: SHIPPED | OUTPATIENT
Start: 2022-05-02 | End: 2022-07-31

## 2022-05-17 ENCOUNTER — TELEPHONE (OUTPATIENT)
Dept: PRIMARY CARE CLINIC | Age: 87
End: 2022-05-17

## 2022-05-17 DIAGNOSIS — R19.7 DIARRHEA, UNSPECIFIED TYPE: Primary | ICD-10-CM

## 2022-05-17 RX ORDER — LOPERAMIDE HYDROCHLORIDE 2 MG/1
2 CAPSULE ORAL 4 TIMES DAILY PRN
Qty: 50 CAPSULE | Refills: 0 | Status: SHIPPED | OUTPATIENT
Start: 2022-05-17 | End: 2022-05-27

## 2022-05-17 NOTE — TELEPHONE ENCOUNTER
She can use imodium for the diarrhea, will send it in  Please make sure she has NOT been using stool softeners etc, since she had done that in the past.   No pharmacy mentioned, sent it to [facility]

## 2022-05-17 NOTE — TELEPHONE ENCOUNTER
----- Message from Virginia Lebron sent at 5/17/2022  9:37 AM EDT -----  Subject: Message to Provider    QUESTIONS  Information for Provider? URGENT! Pt is having extreme diahrrea, starting   about 10 pm last night and has been up since early morning constantly   going to bathroom. Pt is asking for something to be called in to help. Please call obi Huff, [phone]  ---------------------------------------------------------------------------  --------------  Darafshan Boom INFO  What is the best way for the office to contact you? OK to leave message on   voicemail  Preferred Call Back Phone Number? [phone]  ---------------------------------------------------------------------------  --------------  SCRIPT ANSWERS  Relationship to Patient?  Self

## 2022-06-13 DIAGNOSIS — Z79.4 TYPE 2 DIABETES MELLITUS WITHOUT COMPLICATION, WITH LONG-TERM CURRENT USE OF INSULIN (HCC): ICD-10-CM

## 2022-06-13 DIAGNOSIS — E11.9 TYPE 2 DIABETES MELLITUS WITHOUT COMPLICATION, WITH LONG-TERM CURRENT USE OF INSULIN (HCC): ICD-10-CM

## 2022-06-13 RX ORDER — BLOOD-GLUCOSE METER
KIT MISCELLANEOUS
Qty: 300 STRIP | Refills: 3 | Status: SHIPPED | OUTPATIENT
Start: 2022-06-13 | End: 2022-06-14 | Stop reason: SDUPTHER

## 2022-06-14 DIAGNOSIS — Z79.4 TYPE 2 DIABETES MELLITUS WITHOUT COMPLICATION, WITH LONG-TERM CURRENT USE OF INSULIN (HCC): ICD-10-CM

## 2022-06-14 DIAGNOSIS — E11.9 TYPE 2 DIABETES MELLITUS WITHOUT COMPLICATION, WITH LONG-TERM CURRENT USE OF INSULIN (HCC): ICD-10-CM

## 2022-06-14 RX ORDER — BLOOD-GLUCOSE METER
1 KIT MISCELLANEOUS 3 TIMES DAILY
Qty: 300 STRIP | Refills: 3 | Status: SHIPPED | OUTPATIENT
Start: 2022-06-14

## 2022-06-20 ENCOUNTER — HOSPITAL ENCOUNTER (OUTPATIENT)
Dept: WOMENS IMAGING | Age: 87
Discharge: HOME | End: 2022-06-22
Payer: MEDICARE

## 2022-06-20 DIAGNOSIS — C50.811 MALIGNANT NEOPLASM OF MIDLINE OF RIGHT FEMALE BREAST (HCC): ICD-10-CM

## 2022-06-20 PROCEDURE — G0279 TOMOSYNTHESIS, MAMMO: HCPCS

## 2022-06-20 PROCEDURE — 77063 BREAST TOMOSYNTHESIS BI: CPT

## 2022-07-05 RX ORDER — CLOPIDOGREL BISULFATE 75 MG/1
TABLET ORAL
Qty: 90 TABLET | Refills: 3 | Status: SHIPPED | OUTPATIENT
Start: 2022-07-05

## 2022-08-01 RX ORDER — PHENYTOIN SODIUM 100 MG/1
CAPSULE, EXTENDED RELEASE ORAL
Qty: 270 CAPSULE | Refills: 3 | Status: SHIPPED | OUTPATIENT
Start: 2022-08-01

## (undated) DEVICE — THE MONARCH® "D" CARTRIDGE IS A SINGLE-USE POLYPROPYLENE CARTRIDGE FOR POSTERIOR CHAMBER IOL DELIVERY: Brand: MONARCH® III

## (undated) DEVICE — PAD,NON-ADHERENT,3X8,STERILE,LF,1/PK: Brand: MEDLINE

## (undated) DEVICE — GLOVE ORANGE PI 7 1/2   MSG9075

## (undated) DEVICE — GOWN,AURORA,NONREINFORCED,LARGE: Brand: MEDLINE

## (undated) DEVICE — SUTURE ETHLN SZ 3-0 L18IN NONABSORBABLE BLK L24MM PS-1 3/8 1663G

## (undated) DEVICE — SYRINGE, LUER LOCK, 10ML: Brand: MEDLINE

## (undated) DEVICE — GLOVE EXAM M L95IN FNGR THK35MIL PALM THK24MIL OFF WHT

## (undated) DEVICE — SOLUTION IRRIGATION BAL SALT SOLUTION 500 ML STRL BSS

## (undated) DEVICE — 3M™ STERI-STRIP™ COMPOUND BENZOIN TINCTURE 40 BAGS/CARTON 4 CARTONS/CASE C1544: Brand: 3M™ STERI-STRIP™

## (undated) DEVICE — GOWN,AURORA,NONRNF,XL,30/CS: Brand: MEDLINE

## (undated) DEVICE — GLOVE SURG SZ 75 STD WHT LTX SYN POLYMER BEAD REINF ANTI RL

## (undated) DEVICE — SUTURE VCRL + SZ 0 L27IN ABSRB VLT L26MM UR-6 5/8 CIR VCP603H

## (undated) DEVICE — STRAP,CATHETER,ELASTIC,HOOK&LOOP: Brand: MEDLINE

## (undated) DEVICE — INTENDED FOR TISSUE SEPARATION, AND OTHER PROCEDURES THAT REQUIRE A SHARP SURGICAL BLADE TO PUNCTURE OR CUT.: Brand: BARD-PARKER ® CARBON RIB-BACK BLADES

## (undated) DEVICE — CATHETER,URETHRAL,REDRUBBER,STRL,16FR: Brand: MEDLINE

## (undated) DEVICE — DRAINBAG,ANTI-REFLUX TOWER,L/F,2000ML,LL: Brand: MEDLINE

## (undated) DEVICE — NO USE 18 MONTHS GOWN STD LG  1153D

## (undated) DEVICE — PACK LAP BASIC

## (undated) DEVICE — CONVERTED USE 248065 TOWELS OR BL ST

## (undated) DEVICE — STRAP,POSITIONING,KNEE/BODY,FOAM,4X60": Brand: MEDLINE

## (undated) DEVICE — EVACUATOR URO BLDR W/ ADPT UROVAC

## (undated) DEVICE — HYPODERMIC SAFETY NEEDLE: Brand: MAGELLAN

## (undated) DEVICE — COVER,MAYO STAND,STERILE: Brand: MEDLINE

## (undated) DEVICE — Device: Brand: AIR-CHARGED SINGLE SENSOR CATHETER

## (undated) DEVICE — Z DISCONTINUED BY MEDLINE USE 2711682 TRAY SKIN PREP DRY W/ PREM GLV

## (undated) DEVICE — TRAY CATHETER 16FR F INCLUDE BARDX IC COMPLT CARE DRNGE BG

## (undated) DEVICE — DRAPE EQUIP STAND XL MAYO CVR

## (undated) DEVICE — DRAPE,REIN 53X77,STERILE: Brand: MEDLINE

## (undated) DEVICE — PROTECTOR ULN NRV PUR FOAM HK LOOP STRP ANATOMICALLY

## (undated) DEVICE — CATHETER,URETHRAL,REDRUBBER,STRL,14FR: Brand: MEDLINE INDUSTRIES, INC.

## (undated) DEVICE — CATHETER URETH PED 14FR BLLN 5CC 2 W F INF CTRL BARDX

## (undated) DEVICE — GLOVE SURG SZ 65 THK91MIL LTX FREE SYN POLYISOPRENE

## (undated) DEVICE — GLOVE ORANGE PI 7   MSG9070

## (undated) DEVICE — TOWEL SURG W16XL26IN WHT NONFENESTRATED ST 4 PER PK

## (undated) DEVICE — PREP SOL PVP IODINE 4%  4 OZ/BTL

## (undated) DEVICE — BAG,LEG,COMFORT-STRAPS,MEDIUM,20OZ: Brand: MEDLINE

## (undated) DEVICE — GLOVE ORANGE PI 8   MSG9080

## (undated) DEVICE — Y-TYPE TUR/BLADDER IRRIGATION SET, REGULATING CLAMP

## (undated) DEVICE — GAUZE,SPONGE,4"X4",16PLY,XRAY,STRL,LF: Brand: MEDLINE

## (undated) DEVICE — SOLUTION IV IRRIG WATER 1000ML POUR BRL 2F7114

## (undated) DEVICE — TROCAR ENDOSCP L100MM DIA5MM BLDELSS STBL SL OBT RADLUC

## (undated) DEVICE — GAUZE,SPONGE,FLUFF,6"X6.75",STRL,5/TRAY: Brand: MEDLINE

## (undated) DEVICE — TROCAR LAP L100MM DIA5MM BLDELSS W/ STBL SL ENDOPATH XCEL

## (undated) DEVICE — METER,URINE,400ML,DRAIN BAG,L/F,LL: Brand: MEDLINE

## (undated) DEVICE — PACK PROCEDURE SURG CYSTO SVMMC LF

## (undated) DEVICE — 60 ML SYRINGE LUER-LOCK TIP: Brand: MONOJECT

## (undated) DEVICE — NEEDLE SPNL 18GA L3.5IN W/ QNCKE SHARPER BVL DURA CLICK

## (undated) DEVICE — Z INACTIVE PER BARD SYRINGE IRRIG 70ML PLASTICTOOMEY DISPOSABLE

## (undated) DEVICE — SURGICAL PROCEDURE PACK LT EYE CUST ST CHARLES STRL LF DISP

## (undated) DEVICE — ST CHARLES CYSTO PACK: Brand: MEDLINE INDUSTRIES, INC.

## (undated) DEVICE — Z INACTIVE USE 2660664 SOLUTION IRRIG 3000ML 0.9% SOD CHL USP UROMATIC PLAS CONT

## (undated) DEVICE — TROCAR ENDOSCP BLDELSS 12X100 MM W/ HNDL STBL SL OPT TIP

## (undated) DEVICE — GLOVE SURG SZ 6 THK91MIL LTX FREE SYN POLYISOPRENE ANTI

## (undated) DEVICE — INTRODUCER CATH 16FR ORNG SUPRPUB PLAS SHRP TIP BVL TRCR

## (undated) DEVICE — CATHETER URETH 16FR BLLN 5CC SIL ALLY W/ SIL HYDRGEL 2 W F

## (undated) DEVICE — GARMENT,MEDLINE,DVT,INT,CALF,MED, GEN2: Brand: MEDLINE